# Patient Record
Sex: MALE | Race: WHITE | Employment: OTHER | URBAN - NONMETROPOLITAN AREA
[De-identification: names, ages, dates, MRNs, and addresses within clinical notes are randomized per-mention and may not be internally consistent; named-entity substitution may affect disease eponyms.]

---

## 2017-05-22 ENCOUNTER — APPOINTMENT (OUTPATIENT)
Dept: GENERAL RADIOLOGY | Age: 82
End: 2017-05-22
Payer: MEDICARE

## 2017-05-22 ENCOUNTER — HOSPITAL ENCOUNTER (EMERGENCY)
Age: 82
Discharge: HOME | End: 2017-05-22
Attending: EMERGENCY MEDICINE
Payer: MEDICARE

## 2017-05-22 VITALS
BODY MASS INDEX: 25.09 KG/M2 | RESPIRATION RATE: 20 BRPM | DIASTOLIC BLOOD PRESSURE: 74 MMHG | SYSTOLIC BLOOD PRESSURE: 143 MMHG | OXYGEN SATURATION: 98 % | WEIGHT: 206 LBS | HEIGHT: 76 IN | TEMPERATURE: 97.9 F | HEART RATE: 73 BPM

## 2017-05-22 DIAGNOSIS — M79.602 PAIN OF LEFT UPPER EXTREMITY: Primary | ICD-10-CM

## 2017-05-22 LAB
ABSOLUTE EOS #: 0.1 K/UL (ref 0–0.4)
ABSOLUTE LYMPH #: 1 K/UL (ref 1–4.8)
ABSOLUTE MONO #: 0.4 K/UL (ref 0–1)
ALBUMIN SERPL-MCNC: 4 G/DL (ref 3.5–5.2)
ALBUMIN/GLOBULIN RATIO: 1.3 (ref 1–2.5)
ALP BLD-CCNC: 84 U/L (ref 40–129)
ALT SERPL-CCNC: 9 U/L (ref 5–41)
ANION GAP SERPL CALCULATED.3IONS-SCNC: 12 MMOL/L (ref 9–17)
AST SERPL-CCNC: 14 U/L
BASOPHILS # BLD: 0 %
BASOPHILS ABSOLUTE: 0 K/UL (ref 0–0.2)
BILIRUB SERPL-MCNC: 0.74 MG/DL (ref 0.3–1.2)
BUN BLDV-MCNC: 17 MG/DL (ref 8–23)
BUN/CREAT BLD: 18 (ref 9–20)
CALCIUM SERPL-MCNC: 8.8 MG/DL (ref 8.6–10.4)
CHLORIDE BLD-SCNC: 104 MMOL/L (ref 98–107)
CO2: 23 MMOL/L (ref 20–31)
CREAT SERPL-MCNC: 0.93 MG/DL (ref 0.7–1.2)
DIFFERENTIAL TYPE: NORMAL
EOSINOPHILS RELATIVE PERCENT: 3 %
GFR AFRICAN AMERICAN: >60 ML/MIN
GFR NON-AFRICAN AMERICAN: >60 ML/MIN
GFR SERPL CREATININE-BSD FRML MDRD: ABNORMAL ML/MIN/{1.73_M2}
GFR SERPL CREATININE-BSD FRML MDRD: ABNORMAL ML/MIN/{1.73_M2}
GLUCOSE BLD-MCNC: 130 MG/DL (ref 70–99)
HCT VFR BLD CALC: 41 % (ref 41–53)
HEMOGLOBIN: 14.4 G/DL (ref 13.5–17)
LYMPHOCYTES # BLD: 17 %
MCH RBC QN AUTO: 31.9 PG (ref 26–34)
MCHC RBC AUTO-ENTMCNC: 35.2 G/DL (ref 31–37)
MCV RBC AUTO: 90.7 FL (ref 80–100)
MONOCYTES # BLD: 7 %
PDW BLD-RTO: 13.9 % (ref 12.1–15.2)
PLATELET # BLD: 158 K/UL (ref 140–450)
PLATELET ESTIMATE: NORMAL
PMV BLD AUTO: NORMAL FL (ref 6–12)
POTASSIUM SERPL-SCNC: 4 MMOL/L (ref 3.7–5.3)
RBC # BLD: 4.52 M/UL (ref 4.5–5.9)
RBC # BLD: NORMAL 10*6/UL
SEG NEUTROPHILS: 73 %
SEGMENTED NEUTROPHILS ABSOLUTE COUNT: 4 K/UL (ref 1.8–7.7)
SODIUM BLD-SCNC: 139 MMOL/L (ref 135–144)
TOTAL PROTEIN: 7 G/DL (ref 6.4–8.3)
TROPONIN INTERP: NORMAL
TROPONIN INTERP: NORMAL
TROPONIN T: <0.03 NG/ML
TROPONIN T: <0.03 NG/ML
WBC # BLD: 5.6 K/UL (ref 3.5–11)
WBC # BLD: NORMAL 10*3/UL

## 2017-05-22 PROCEDURE — 36415 COLL VENOUS BLD VENIPUNCTURE: CPT

## 2017-05-22 PROCEDURE — 85025 COMPLETE CBC W/AUTO DIFF WBC: CPT

## 2017-05-22 PROCEDURE — 84484 ASSAY OF TROPONIN QUANT: CPT

## 2017-05-22 PROCEDURE — 99284 EMERGENCY DEPT VISIT MOD MDM: CPT

## 2017-05-22 PROCEDURE — 71020 XR CHEST STANDARD TWO VW: CPT

## 2017-05-22 PROCEDURE — 80053 COMPREHEN METABOLIC PANEL: CPT

## 2017-05-22 PROCEDURE — 6370000000 HC RX 637 (ALT 250 FOR IP): Performed by: EMERGENCY MEDICINE

## 2017-05-22 PROCEDURE — 93005 ELECTROCARDIOGRAM TRACING: CPT

## 2017-05-22 RX ORDER — CALCIUM CARBONATE 500(1250)
500 TABLET ORAL DAILY
COMMUNITY
End: 2021-07-01 | Stop reason: ALTCHOICE

## 2017-05-22 RX ORDER — NITROGLYCERIN 0.4 MG/1
0.4 TABLET SUBLINGUAL EVERY 5 MIN PRN
Status: DISCONTINUED | OUTPATIENT
Start: 2017-05-22 | End: 2017-05-22 | Stop reason: HOSPADM

## 2017-05-22 RX ORDER — GLUCOSAM/MSM/CHOND/HYALURON AC 750-60-150
1 TABLET ORAL DAILY
Status: ON HOLD | COMMUNITY
End: 2017-06-01 | Stop reason: HOSPADM

## 2017-05-22 RX ORDER — CHLORAL HYDRATE 500 MG
1000 CAPSULE ORAL DAILY
COMMUNITY
End: 2021-06-29 | Stop reason: ALTCHOICE

## 2017-05-22 RX ORDER — ASPIRIN 81 MG/1
325 TABLET, CHEWABLE ORAL DAILY
Status: DISCONTINUED | OUTPATIENT
Start: 2017-05-22 | End: 2017-05-22 | Stop reason: HOSPADM

## 2017-05-22 RX ADMIN — NITROGLYCERIN 0.4 MG: 0.4 TABLET SUBLINGUAL at 09:50

## 2017-05-22 RX ADMIN — ASPIRIN 81 MG 325 MG: 81 TABLET ORAL at 09:41

## 2017-05-22 RX ADMIN — NITROGLYCERIN 0.4 MG: 0.4 TABLET SUBLINGUAL at 09:45

## 2017-05-22 RX ADMIN — NITROGLYCERIN 0.4 MG: 0.4 TABLET SUBLINGUAL at 09:40

## 2017-05-22 ASSESSMENT — ENCOUNTER SYMPTOMS
EYE PAIN: 0
NAUSEA: 0
DIARRHEA: 0
VOMITING: 0
SORE THROAT: 0
BACK PAIN: 0
ABDOMINAL PAIN: 0
SHORTNESS OF BREATH: 0
CONSTIPATION: 0

## 2017-05-22 ASSESSMENT — PAIN SCALES - GENERAL
PAINLEVEL_OUTOF10: 7
PAINLEVEL_OUTOF10: 7
PAINLEVEL_OUTOF10: 8
PAINLEVEL_OUTOF10: 6
PAINLEVEL_OUTOF10: 7
PAINLEVEL_OUTOF10: 8
PAINLEVEL_OUTOF10: 8

## 2017-05-22 ASSESSMENT — HEART SCORE: ECG: 1

## 2017-05-22 ASSESSMENT — PAIN DESCRIPTION - ORIENTATION: ORIENTATION: LEFT;POSTERIOR

## 2017-05-22 ASSESSMENT — PAIN DESCRIPTION - PAIN TYPE: TYPE: ACUTE PAIN

## 2017-05-22 ASSESSMENT — PAIN DESCRIPTION - LOCATION: LOCATION: ARM;KNEE

## 2017-05-25 LAB
EKG ATRIAL RATE: 64 BPM
EKG P AXIS: 65 DEGREES
EKG P-R INTERVAL: 228 MS
EKG Q-T INTERVAL: 438 MS
EKG QRS DURATION: 148 MS
EKG QTC CALCULATION (BAZETT): 451 MS
EKG R AXIS: 73 DEGREES
EKG T AXIS: 59 DEGREES
EKG VENTRICULAR RATE: 64 BPM

## 2017-05-30 ENCOUNTER — HOSPITAL ENCOUNTER (EMERGENCY)
Age: 82
Discharge: OTHER INSTITUTION | End: 2017-05-30
Attending: EMERGENCY MEDICINE
Payer: MEDICARE

## 2017-05-30 ENCOUNTER — HOSPITAL ENCOUNTER (INPATIENT)
Age: 82
LOS: 2 days | Discharge: HOME | DRG: 065 | End: 2017-06-01
Attending: EMERGENCY MEDICINE | Admitting: INTERNAL MEDICINE
Payer: MEDICARE

## 2017-05-30 ENCOUNTER — APPOINTMENT (OUTPATIENT)
Dept: GENERAL RADIOLOGY | Age: 82
End: 2017-05-30
Payer: MEDICARE

## 2017-05-30 ENCOUNTER — APPOINTMENT (OUTPATIENT)
Dept: CT IMAGING | Age: 82
End: 2017-05-30
Payer: MEDICARE

## 2017-05-30 VITALS
DIASTOLIC BLOOD PRESSURE: 105 MMHG | HEART RATE: 64 BPM | OXYGEN SATURATION: 97 % | RESPIRATION RATE: 15 BRPM | SYSTOLIC BLOOD PRESSURE: 138 MMHG

## 2017-05-30 DIAGNOSIS — R53.1 RIGHT SIDED WEAKNESS: Primary | ICD-10-CM

## 2017-05-30 DIAGNOSIS — R29.898 WEAKNESS OF RIGHT UPPER EXTREMITY: Primary | ICD-10-CM

## 2017-05-30 DIAGNOSIS — R27.8 DYSMETRIA: ICD-10-CM

## 2017-05-30 DIAGNOSIS — R27.0 ATAXIA: ICD-10-CM

## 2017-05-30 PROBLEM — R42 DISEQUILIBRIUM: Status: RESOLVED | Noted: 2017-05-30 | Resolved: 2017-05-30

## 2017-05-30 PROBLEM — I10 HYPERTENSION: Status: ACTIVE | Noted: 2017-05-30

## 2017-05-30 PROBLEM — R42 DISEQUILIBRIUM: Status: ACTIVE | Noted: 2017-05-30

## 2017-05-30 PROBLEM — M62.81 RIGHT-SIDED MUSCLE WEAKNESS: Status: ACTIVE | Noted: 2017-05-30

## 2017-05-30 LAB
ABSOLUTE EOS #: 0.1 K/UL (ref 0–0.4)
ABSOLUTE LYMPH #: 0.9 K/UL (ref 1–4.8)
ABSOLUTE MONO #: 0.4 K/UL (ref 0–1)
ALBUMIN SERPL-MCNC: 3.9 G/DL (ref 3.5–5.2)
ALBUMIN/GLOBULIN RATIO: 1.3 (ref 1–2.5)
ALP BLD-CCNC: 81 U/L (ref 40–129)
ALT SERPL-CCNC: 7 U/L (ref 5–41)
ANION GAP SERPL CALCULATED.3IONS-SCNC: 15 MMOL/L (ref 9–17)
AST SERPL-CCNC: 13 U/L
BASOPHILS # BLD: 0 %
BASOPHILS ABSOLUTE: 0 K/UL (ref 0–0.2)
BILIRUB SERPL-MCNC: 0.71 MG/DL (ref 0.3–1.2)
BUN BLDV-MCNC: 20 MG/DL (ref 8–23)
BUN/CREAT BLD: 20 (ref 9–20)
CALCIUM SERPL-MCNC: 8.9 MG/DL (ref 8.6–10.4)
CHLORIDE BLD-SCNC: 101 MMOL/L (ref 98–107)
CHOLESTEROL/HDL RATIO: 4.3
CHOLESTEROL: 153 MG/DL
CO2: 24 MMOL/L (ref 20–31)
CREAT SERPL-MCNC: 0.98 MG/DL (ref 0.7–1.2)
DIFFERENTIAL TYPE: ABNORMAL
EOSINOPHILS RELATIVE PERCENT: 3 %
GFR AFRICAN AMERICAN: >60 ML/MIN
GFR NON-AFRICAN AMERICAN: >60 ML/MIN
GFR SERPL CREATININE-BSD FRML MDRD: ABNORMAL ML/MIN/{1.73_M2}
GFR SERPL CREATININE-BSD FRML MDRD: ABNORMAL ML/MIN/{1.73_M2}
GLUCOSE BLD-MCNC: 112 MG/DL (ref 70–99)
GLUCOSE BLD-MCNC: 94 MG/DL (ref 74–100)
HCT VFR BLD CALC: 41.7 % (ref 41–53)
HDLC SERPL-MCNC: 36 MG/DL
HEMOGLOBIN: 14.5 G/DL (ref 13.5–17)
INR BLD: 1.1
LDL CHOLESTEROL: 93 MG/DL (ref 0–130)
LYMPHOCYTES # BLD: 20 %
MCH RBC QN AUTO: 31.7 PG (ref 26–34)
MCHC RBC AUTO-ENTMCNC: 34.8 G/DL (ref 31–37)
MCV RBC AUTO: 90.9 FL (ref 80–100)
MONOCYTES # BLD: 8 %
PDW BLD-RTO: 13.6 % (ref 12.1–15.2)
PLATELET # BLD: 163 K/UL (ref 140–450)
PLATELET ESTIMATE: ABNORMAL
PMV BLD AUTO: ABNORMAL FL (ref 6–12)
POTASSIUM SERPL-SCNC: 3.9 MMOL/L (ref 3.7–5.3)
PROTHROMBIN TIME: 11.7 SEC (ref 9.4–12.6)
RBC # BLD: 4.59 M/UL (ref 4.5–5.9)
RBC # BLD: ABNORMAL 10*6/UL
SEG NEUTROPHILS: 69 %
SEGMENTED NEUTROPHILS ABSOLUTE COUNT: 3.2 K/UL (ref 1.8–7.7)
SODIUM BLD-SCNC: 140 MMOL/L (ref 135–144)
TOTAL PROTEIN: 6.9 G/DL (ref 6.4–8.3)
TRIGL SERPL-MCNC: 122 MG/DL
TROPONIN INTERP: NORMAL
TROPONIN T: <0.03 NG/ML
TSH SERPL DL<=0.05 MIU/L-ACNC: 3.42 MIU/L (ref 0.3–5)
VITAMIN D 25-HYDROXY: 28.5 NG/ML (ref 30–100)
VLDLC SERPL CALC-MCNC: ABNORMAL MG/DL (ref 1–30)
WBC # BLD: 4.7 K/UL (ref 3.5–11)
WBC # BLD: ABNORMAL 10*3/UL

## 2017-05-30 PROCEDURE — 2580000003 HC RX 258: Performed by: STUDENT IN AN ORGANIZED HEALTH CARE EDUCATION/TRAINING PROGRAM

## 2017-05-30 PROCEDURE — 6370000000 HC RX 637 (ALT 250 FOR IP): Performed by: PHYSICIAN ASSISTANT

## 2017-05-30 PROCEDURE — 85610 PROTHROMBIN TIME: CPT

## 2017-05-30 PROCEDURE — 71020 XR CHEST STANDARD TWO VW: CPT

## 2017-05-30 PROCEDURE — 2060000000 HC ICU INTERMEDIATE R&B

## 2017-05-30 PROCEDURE — 80061 LIPID PANEL: CPT

## 2017-05-30 PROCEDURE — 2580000003 HC RX 258: Performed by: PHYSICIAN ASSISTANT

## 2017-05-30 PROCEDURE — 82306 VITAMIN D 25 HYDROXY: CPT

## 2017-05-30 PROCEDURE — 70450 CT HEAD/BRAIN W/O DYE: CPT

## 2017-05-30 PROCEDURE — 85025 COMPLETE CBC W/AUTO DIFF WBC: CPT

## 2017-05-30 PROCEDURE — 82947 ASSAY GLUCOSE BLOOD QUANT: CPT

## 2017-05-30 PROCEDURE — 36415 COLL VENOUS BLD VENIPUNCTURE: CPT

## 2017-05-30 PROCEDURE — 99223 1ST HOSP IP/OBS HIGH 75: CPT | Performed by: INTERNAL MEDICINE

## 2017-05-30 PROCEDURE — 84443 ASSAY THYROID STIM HORMONE: CPT

## 2017-05-30 PROCEDURE — 12001 RPR S/N/AX/GEN/TRNK 2.5CM/<: CPT

## 2017-05-30 PROCEDURE — 83036 HEMOGLOBIN GLYCOSYLATED A1C: CPT

## 2017-05-30 PROCEDURE — 84484 ASSAY OF TROPONIN QUANT: CPT

## 2017-05-30 PROCEDURE — 93005 ELECTROCARDIOGRAM TRACING: CPT

## 2017-05-30 PROCEDURE — 80053 COMPREHEN METABOLIC PANEL: CPT

## 2017-05-30 PROCEDURE — 99285 EMERGENCY DEPT VISIT HI MDM: CPT

## 2017-05-30 PROCEDURE — 99283 EMERGENCY DEPT VISIT LOW MDM: CPT

## 2017-05-30 RX ORDER — SODIUM CHLORIDE 0.9 % (FLUSH) 0.9 %
10 SYRINGE (ML) INJECTION PRN
Status: DISCONTINUED | OUTPATIENT
Start: 2017-05-30 | End: 2017-06-01 | Stop reason: HOSPADM

## 2017-05-30 RX ORDER — IBUPROFEN 200 MG
1250 CAPSULE ORAL DAILY
Status: DISCONTINUED | OUTPATIENT
Start: 2017-05-30 | End: 2017-06-01 | Stop reason: HOSPADM

## 2017-05-30 RX ORDER — CLOPIDOGREL BISULFATE 75 MG/1
300 TABLET ORAL ONCE
Status: COMPLETED | OUTPATIENT
Start: 2017-05-30 | End: 2017-05-30

## 2017-05-30 RX ORDER — NICOTINE POLACRILEX 4 MG
15 LOZENGE BUCCAL PRN
Status: DISCONTINUED | OUTPATIENT
Start: 2017-05-30 | End: 2017-06-01 | Stop reason: HOSPADM

## 2017-05-30 RX ORDER — GLUCOSAM/MSM/CHOND/HYALURON AC 750-60-150
1 TABLET ORAL DAILY
Status: DISCONTINUED | OUTPATIENT
Start: 2017-05-30 | End: 2017-05-30

## 2017-05-30 RX ORDER — LABETALOL HYDROCHLORIDE 5 MG/ML
10 INJECTION, SOLUTION INTRAVENOUS EVERY 4 HOURS PRN
Status: DISCONTINUED | OUTPATIENT
Start: 2017-05-30 | End: 2017-06-01 | Stop reason: HOSPADM

## 2017-05-30 RX ORDER — ACETAMINOPHEN 325 MG/1
650 TABLET ORAL EVERY 4 HOURS PRN
Status: DISCONTINUED | OUTPATIENT
Start: 2017-05-30 | End: 2017-06-01 | Stop reason: HOSPADM

## 2017-05-30 RX ORDER — DEXTROSE MONOHYDRATE 25 G/50ML
12.5 INJECTION, SOLUTION INTRAVENOUS PRN
Status: DISCONTINUED | OUTPATIENT
Start: 2017-05-30 | End: 2017-06-01 | Stop reason: HOSPADM

## 2017-05-30 RX ORDER — SODIUM CHLORIDE 0.9 % (FLUSH) 0.9 %
10 SYRINGE (ML) INJECTION PRN
Status: CANCELLED | OUTPATIENT
Start: 2017-05-30

## 2017-05-30 RX ORDER — CHLORAL HYDRATE 500 MG
1000 CAPSULE ORAL DAILY
Status: DISCONTINUED | OUTPATIENT
Start: 2017-05-30 | End: 2017-05-30

## 2017-05-30 RX ORDER — CLOPIDOGREL BISULFATE 75 MG/1
75 TABLET ORAL DAILY
Status: DISCONTINUED | OUTPATIENT
Start: 2017-05-30 | End: 2017-05-30

## 2017-05-30 RX ORDER — ASPIRIN 81 MG/1
81 TABLET, CHEWABLE ORAL DAILY
Status: DISCONTINUED | OUTPATIENT
Start: 2017-05-30 | End: 2017-05-31

## 2017-05-30 RX ORDER — SODIUM CHLORIDE 0.9 % (FLUSH) 0.9 %
10 SYRINGE (ML) INJECTION EVERY 12 HOURS SCHEDULED
Status: DISCONTINUED | OUTPATIENT
Start: 2017-05-30 | End: 2017-06-01 | Stop reason: HOSPADM

## 2017-05-30 RX ORDER — DEXTROSE MONOHYDRATE 50 MG/ML
100 INJECTION, SOLUTION INTRAVENOUS PRN
Status: DISCONTINUED | OUTPATIENT
Start: 2017-05-30 | End: 2017-06-01 | Stop reason: HOSPADM

## 2017-05-30 RX ORDER — ONDANSETRON 2 MG/ML
4 INJECTION INTRAMUSCULAR; INTRAVENOUS EVERY 6 HOURS PRN
Status: DISCONTINUED | OUTPATIENT
Start: 2017-05-30 | End: 2017-06-01 | Stop reason: HOSPADM

## 2017-05-30 RX ORDER — 0.9 % SODIUM CHLORIDE 0.9 %
250 INTRAVENOUS SOLUTION INTRAVENOUS ONCE
Status: COMPLETED | OUTPATIENT
Start: 2017-05-30 | End: 2017-05-30

## 2017-05-30 RX ORDER — SODIUM CHLORIDE 0.9 % (FLUSH) 0.9 %
10 SYRINGE (ML) INJECTION EVERY 12 HOURS SCHEDULED
Status: CANCELLED | OUTPATIENT
Start: 2017-05-30

## 2017-05-30 RX ADMIN — CLOPIDOGREL BISULFATE 300 MG: 75 TABLET ORAL at 15:00

## 2017-05-30 RX ADMIN — SODIUM CHLORIDE 250 ML: 9 INJECTION, SOLUTION INTRAVENOUS at 15:00

## 2017-05-30 RX ADMIN — Medication 10 ML: at 21:37

## 2017-05-30 ASSESSMENT — ENCOUNTER SYMPTOMS
SHORTNESS OF BREATH: 0
SHORTNESS OF BREATH: 0
EYE PAIN: 0
WHEEZING: 0
EYE REDNESS: 0
NAUSEA: 0
DIARRHEA: 0
ABDOMINAL PAIN: 0
NAUSEA: 0
COUGH: 0
ABDOMINAL PAIN: 0
VOMITING: 0
BACK PAIN: 0
RHINORRHEA: 0
BLURRED VISION: 1
PHOTOPHOBIA: 0
VOMITING: 0
EYE DISCHARGE: 0
CONSTIPATION: 0
DIARRHEA: 0
SORE THROAT: 0
WHEEZING: 0
BLOOD IN STOOL: 0
SORE THROAT: 0
CHEST TIGHTNESS: 0
DOUBLE VISION: 0
COUGH: 0

## 2017-05-30 ASSESSMENT — PAIN SCALES - GENERAL: PAINLEVEL_OUTOF10: 0

## 2017-05-30 NOTE — IP AVS SNAPSHOT
Patient Information     Patient Name JOSIE Drew 1932      Important Information for Stroke      If you have a current diagnosis or history of any of the following, please review the information carefully. STROKE PATIENTS:  If you notice any sign or symptom that indicates that you may be having a stroke, activate the emergency medical services immediately by calling [id number]. These symptoms include: uneven facial expression, arm(s) drifting down, strange speech or loss of speech, vision problems, sudden severe headache, sudden numbness or face/arms/legs, sudden confusion or difficult understanding, sudden dizziness, sudden difficulty with walking. Continue or begin taking the medications prescribed by your physician as listed above. There are personal risk that are associated with [place]. Anyone can have a stroke no matter your age, race or gender. The chances of having a stroke increase if you have certain risk factors. The best way to protect yourself and loved ones from stroke is to understand your personal risk and how to manage it. There are 2 types of risk factors for stroke: uncontrollable and controllable. Uncontrollable risk factors include being over age 54, being male, being ,  or /, or having a personal or family history of a stroke or transient ischemic attack (TIA). Controllable risk factors generally fall into two categories: lifestyle risk factors or medical risk factors. Lifestyle risk factors can often be changed, while medical risk factors can usually be treated. Both types can be managed best by working with a doctor, who can prescribe medications and advise on how to adopt a healthy lifestyle. Controllable risk factors include high blood pressure, atrial fibrillation, high cholesterol, diabetes, atherosclerosis, circulation problems, tobacco use or smoking, alcohol use, lack of exercise, and obesity.

## 2017-05-30 NOTE — H&P
file. He reports that he drinks about 2.4 oz of alcohol per week  He reports that he does not use illicit drugs. FAMILY HISTORY      family history is not on file. REVIEW OF SYSTEMS      · Constitutional: Negative for weight loss  · Eyes: Negative for visual changes, diplopia, scleral icterus. · ENT: Negative for Headaches, vertigo, mouth sores, sore throat. · Cardiovascular: Negative for lightheadedness/orthostatic symptoms ,chest pain, dyspnea on exertion, palpitations or loss of consciousness. · Respiratory: Negative for cough or wheezing, sputum production, hemoptysis, pleuritic pain. · Gastrointestinal: Negative for nausea/vomiting, change in bowel habits, abdominal pain, dysphagia/appetite loss, hematemesis, blood in stools. · Genitourinary:Negative for change in bladder habits, dysuria, trouble voiding, hematuria. · Musculoskeletal: Positive for gait disturbance, weakness  · Integumentary: Negative for rash, pruritis. · Neurological: Positive for change in muscle strength, change in gait, balance, coordination Negative for headache, dizziness, loss of sensation  · Psychiatric: negative for change in mood, affect, memory, mentation, behavior. · Endocrine: negative for temperature intolerance, excessive thirst, fluid intake, or urination, tremor. · Hematologic/Lymphatic: negative for abnormal bruising or bleeding, blood clots, swollen lymph nodes. · Allergic/Immunologic: negative for nasal congestion, pruritis, hives. PHYSICAL EXAM      BP (!) 159/73  Pulse 59  Temp 97.8 °F (36.6 °C) (Oral)   Resp 12  Ht 6' 3\" (1.905 m)  Wt 196 lb (88.9 kg)  SpO2 99%  BMI 24.5 kg/m2     · General appearance: well nourished  · HEENT: Head: Normocephalic, no lesions, without obvious abnormality.   · Lungs: clear to auscultation bilaterally  · Heart: regular rate and rhythm, S1, S2 normal, no murmur, click, rub or gallop  · Abdomen: soft, non-tender; bowel sounds normal; no masses,  no Extremity weakness, possible CVA       FINDINGS: No evidence of acute cerebral cortical infarction, hemorrhage or mass lesion. Patchy and scattered foci  of hypoattenuation in the subcortical and periventricular white matter of both cerebral hemispheres. Although nonspecific, this is    typically seen as a sequela of chronic ischemic small vessel disease in a patient of this age. Mild  diffuse cerebral and cerebellar atrophy. The ventricular system and cortical sulci are appropriate for degree of atrophy. Calvarium is intact. Paranasal    sinuses and mastoid air cells are clear.       1. No acute intracranial process   2. Mild  chronic white matter changes and atrophy       Final report electronically signed by María Paulson on 5/30/2017 2:17 PM        ADDENDUM: INCORRECT REPORT. PLEASE SEE BELOW REVISED REPORT.       REPORT: Chest PA and lateral       INDICATION: CVA       FINDINGS: Compared to 5/22/2017. The lungs are well expanded and clear bilaterally. No focal consolidation, pleural effusion or pneumothorax seen. Normal cardiac and mediastinal silhouettes. No free intraperitoneal air.  Hypertrophic degenerative changes    thoracic spine.           Final report electronically signed by María Paulson on 5/30/2017 3:44 PM    ORIGINAL REPORT   REPORT: Chest PA and lateral       INDICATION: CVA       FINDINGS: Compared to 12/24/2015 The lungs are well expanded and clear bilaterally. No focal consolidation, pleural effusion or pneumothorax seen. Normal stable cardiomegaly. No free intraperitoneal air.  Degenerative changes thoracic spine.           Final report electronically signed by María Paulson on 5/30/2017 2:18 PM       Impression   Normal chest    IMPRESSION: No acute pulmonary process         ASSESSMENT     Patient Active Problem List   Diagnosis    Right-sided muscle weakness    Hypertension       PLAN      1. Neurology consult  2. cardene drip for BP control  3.  Labetalol PRN  4. HgA1C, Lipid panel, PT/INR, TSH

## 2017-05-30 NOTE — CARE COORDINATION
Case Management Initial Discharge Plan  Mary Felicita,         Readmission Risk              Readmission Risk:        3.5       Age 72 or Greater:  1    Admitted from SNF or Requires Paid or Family Care:  0    Currently has CHF,COPD,ARF,CRI,or is on dialysis:  0    Takes more than 5 Prescription Medications:  0    Takes Digoxin,Insulin,Anticoagulants,Narcotics or ASA/Plavix:  [address] in Past 12 Months:  0    On Disability:  0    Patient Considers own Health:  2.5            Met with:patient to discuss discharge plans. Information verified: address, contacts, phone number, , insurance Yes  PCP: Ross Parker DO  Date of last visit: 17    Insurance Provider: Medicare    Discharge Planning  Current Residence:  Private Residence  Living Arrangements:  [place] has 2 stories/1 flight stairs to climb, pt lives on main level  Support Systems:  None  Current Services PTA:  None Supplier: na  Patient able to perform ADL's:Independent  DME used to aid ambulation prior to admission: pt has a \"walking stick\" that he uses prn/during admission to be determined    Potential Assistance Needed:  Transportation    Pharmacy: [facility]   Potential Assistance Purchasing Medications:  No  Does patient want to participate in local refill/ meds to beds program?  No    Patient agreeable to home care: No  [place] of choice provided:  n/a      Type of Home Care Services:  None  Patient expects to be discharged to:  Home    Prior SNF/Rehab Placement and Facility: no  Agreeable to SNF/Rehab: No  [place] of choice provided: n/a   Evaluation: n/a    Expected Discharge date:      Follow Up Appointment: Best Day/ Time:      Transportation provider: unsure  Transportation arrangements needed for discharge: Yes    Discharge Plan: Home independently, possible [facility].        Electronically signed by Rolly Asher RN on 17 at 5:58 PM

## 2017-05-30 NOTE — IP AVS SNAPSHOT
After Visit Summary  (Discharge Instructions)    Medication List for Home    Based on the information you provided to us as well as any changes during this visit, the following is your updated medication list.  Compare this with your prescription bottles at home. If you have any questions or concerns, contact your primary care physician's office. Daily Medication List (This medication list can be shared with any healthcare provider who is helping you manage your medications)      There are NEW medications for you. START taking them after you leave the hospital        Last Dose    Next Dose Due AM NOON PM NIGHT    aspirin 81 MG chewable tablet   Take 1 tablet by mouth daily   Replaces:  aspirin 81 MG tablet                81 mg on 6/1/2017  9:11 AM                            atorvastatin 40 MG tablet   Commonly known as:  LIPITOR   Take 1 tablet by mouth nightly                40 mg on 5/31/2017  9:58 PM                            clopidogrel 75 MG tablet   Commonly known as:  PLAVIX   Take 1 tablet by mouth daily                75 mg on 6/1/2017  2:05 PM                              These are medications you told us you were taking at home, CONTINUE taking them after you leave the hospital        Last Dose    Next Dose Due AM NOON PM NIGHT    calcium carbonate 500 MG Tabs tablet   Commonly known as:  OSCAL   Take 500 mg by mouth daily                                         fish oil 1000 MG Caps   Take 1,000 mg by mouth daily                                         NONFORMULARY   Take 1 tablet by mouth daily neutriview.                                            These are the medications you have told us you were taking at home, STOP taking them after you leave the hospital     aspirin 81 MG tablet   Replaced by:  aspirin 81 MG chewable tablet       GLUCOSAMINE CHONDROITIN JOINT Tabs            Where to Get Your Medications      These medications were sent to [place] [id number] Your primary diagnosis was:  Ischemic Stroke ([facility])    Your diagnoses also included:  Right-Sided Muscle Weakness, Disequilibrium, Hypertension, Vitamin D Deficiency, Right Sided Weakness      Visit Information     Date & Time Department Dept. Phone    5/30/2017 [facility] Neuro [phone]       Follow-up Appointments    Below is a list of your follow-up and future appointments. This may not be a complete list as you may have made appointments directly with providers that we are not aware of or your providers may have made some for you. Please call your providers to confirm appointments. It is important to keep your appointments. Please bring your current insurance card, photo ID, co-pay, and all medication bottles to your appointment. If self-pay, payment is expected at the time of service. Follow-up Information     Follow up with Raymond Shabazz DO. Specialty:  Family Medicine    Contact information:    [address] [place] [address] Hakeem Garcia[address]  [phone]          Follow up with Kary Oates MD On 7/5/2017. Specialty:  Neurology    Why:  Neurology follow-up post stroke, arrival time is 2:00 PM    Contact information:    [place], [address] [place] [address]  [phone]        Future Appointments     7/5/2017 2:20 PM     Appointment with Kary Oates MD at [facility] ([phone])   Please arrive 15 minutes prior to appointment, bring insurance card and photo ID.   [place] Once You Return Home    This section includes instructions you will need to follow once you leave the hospital.  Your care team will discuss these with you, so you and those caring for you know how to best care for your health needs at home. This section may also include educational information about certain health topics that may be of help to you.           Discharge Instructions [facility]) Continuity of Care Form    Patient Name: Jeremy Arreguin   :  1932  MRN:  [id number]    Admit date:  2017  Discharge date:  ***    Code Status Order: Full Code   Advance Directives: {YES OR NO:}    Admitting Physician:  Samy Cantor MD  PCP: Giuseppe Maya DO    Discharging Nurse: [facility] Unit/Room#: 9459/4923-19  Discharging Unit Phone Number: ***    Emergency Contact:   Contact 1: Name: Larry Solitario  Contact 1: Number: [phone]  Contact 1: Relationship: son    Past Surgical History:  Past Surgical History:   Procedure Laterality Date    EYE SURGERY      new lens    SHOULDER SURGERY      right       Immunization History: There is no immunization history on file for this patient. Active Problems:  Patient Active Problem List   Diagnosis    Right-sided muscle weakness    Hypertension    Vitamin D deficiency       Isolation/Infection:   Isolation     No Isolation            Nurse Assessment:  Last Vital Signs: BP (!) 173/69  Pulse 64  Temp 97.6 °F (36.4 °C) (Oral)   Resp 16  Ht 6' 3\" (1.905 m)  Wt 182 lb 8 oz (82.8 kg)  SpO2 99%  BMI 22.81 kg/m2    Last documented pain score (0-10 scale): Pain Level: 0  Last Weight:   Wt Readings from Last 1 Encounters:   17 182 lb 8 oz (82.8 kg)     Mental Status:  oriented and alert     IV Access:  - None    Nursing Mobility/ADLs:  Walking   Independent  Transfer  Independent  Bathing  Independent  Dressing  Independent  [address]  Independent  Med Delivery   whole    Wound Care Documentation and Therapy:        Elimination:  Continence:   · Bowel:  Yes  · Bladder: Yes  Urinary Catheter: None   Colostomy/Ileostomy/Ileal Conduit: No    Date of Last BM: ***    Intake/Output Summary (Last 24 hours) at 17 1423  Last data filed at 17 0610   Gross per 24 hour   Intake              460 ml   Output              200 ml   Net              260 ml I/O last 3 completed shifts: In: 26 [P.O.:450; I.V.:10]  Out: 200 [Urine:200]    Safety Concerns: At Risk for Falls    Impairments/Disabilities:      Vision and Hearing    Nutrition Therapy:  Current Nutrition Therapy:   - Oral Diet:  General    Routes of Feeding: Oral  Liquids: No Liquids  Daily Fluid Restriction: no  Last Modified Barium Swallow with Video (Video Swallowing Test): not done    Treatments at the Time of Hospital Discharge:   Respiratory Treatments: ***  Oxygen Therapy:  is not on home oxygen therapy. Ventilator:    - No ventilator support    Rehab Therapies: Physical Therapy and Occupational Therapy  Weight Bearing Status/Restrictions: No weight bearing restirctions  Other Medical Equipment (for information only, NOT a DME order):  {EQUIPMENT:[id number]}  Other Treatments: Skilled RN assessment, medication review, discharge paperwork reinforcement, Social work consult for HHA. Patient's personal belongings (please select all that are sent with patient):  pt belongings sent with patient    RN SIGNATURE:  Electronically signed by Juani Henderson RN on 6/1/17 at 6:06 PM    PHYSICIAN SECTION    Prognosis: Good    Condition at Discharge: Stable    Rehab Potential (if transferring to Rehab): Good    Recommended Labs or Other Treatments After Discharge: none    Physician Certification: I certify the above information and transfer of Sharon Hancock  is necessary for the continuing treatment of the diagnosis listed and that he requires [address] for greater 30 days.      Update Admission H&P: Changes in H&P as follows - see discharge summary    PHYSICIAN SIGNATURE:  Electronically signed by Tere Odom MD on 6/1/17 at 4:35 PM    CASE MANAGEMENT/SOCIAL WORK SECTION    Inpatient Status Date: 05/30/2017    [facility]  Risk Score: 2.25   (Score > 14= high risk for readmission)    Discharging to Facility/ Agency   · Name: [facility]  · Address:

## 2017-05-30 NOTE — H&P
headaches. Endo/Heme/Allergies: Negative for environmental allergies. Psychiatric/Behavioral: Negative for substance abuse. MEDICAL HISTORY          Diagnosis Date    Uses hearing aid        SURGICAL HISTORY          Procedure Laterality Date    EYE SURGERY      new lens    SHOULDER SURGERY      right       ***    SOCIAL HISTORY      Social History     Social History    Marital status: Single     Spouse name: N/A    Number of children: N/A    Years of education: N/A     Occupational History    Not on file. Social History Main Topics    Smoking status: Former Smoker    Smokeless tobacco: Not on file    Alcohol use 2.4 oz/week     4 Cans of beer per week    Drug use: No    Sexual activity: Not on file     Other Topics Concern    Not on file     Social History Narrative       MEDICATIONS      Previous Medications    ASPIRIN 81 MG TABLET    Take 81 mg by mouth daily    CALCIUM CARBONATE (OSCAL) 500 MG TABS TABLET    Take 500 mg by mouth daily    GLUCOS-CHONDROIT-HYALURON-MSM (GLUCOSAMINE CHONDROITIN JOINT) TABS    Take 1 tablet by mouth daily    NONFORMULARY    Take 1 tablet by mouth daily neutriview. OMEGA-3 FATTY ACIDS (FISH OIL) 1000 MG CAPS    Take 1,000 mg by mouth daily       ALLERGIES    has No Known Allergies.       PHYSICAL EXAM     Vitals:    Vitals:    05/30/17 1718 05/30/17 1721 05/30/17 1732   BP: (!) 163/70 (!) 163/70 (!) 153/63   Pulse: 59 60 59   Resp: 12 15 13   Temp: 97.8 °F (36.6 °C)     TempSrc: Oral     SpO2: 99% 98% 99%   Weight: 196 lb (88.9 kg)     Height: 6' 3\" (1.905 m)         ***        DIAGNOSTIC RESULTS     EKG: All EKG's are interpreted by the Emergency Department Physician who either signs or Co-signs this chart in the absence of a cardiologist.    ***    RADIOLOGY:   I directly visualized the following  images and reviewed the radiologist interpretations:     No orders to display       LABS:  Labs Reviewed - No data to display    ***    EMERGENCY DEPARTMENT

## 2017-05-31 ENCOUNTER — APPOINTMENT (OUTPATIENT)
Dept: MRI IMAGING | Age: 82
DRG: 065 | End: 2017-05-31
Payer: MEDICARE

## 2017-05-31 PROBLEM — E55.9 VITAMIN D DEFICIENCY: Status: ACTIVE | Noted: 2017-05-31

## 2017-05-31 LAB
ANION GAP SERPL CALCULATED.3IONS-SCNC: 18 MMOL/L (ref 9–17)
BUN BLDV-MCNC: 18 MG/DL (ref 8–23)
BUN/CREAT BLD: ABNORMAL (ref 9–20)
CALCIUM SERPL-MCNC: 8.7 MG/DL (ref 8.6–10.4)
CHLORIDE BLD-SCNC: 107 MMOL/L (ref 98–107)
CO2: 20 MMOL/L (ref 20–31)
CREAT SERPL-MCNC: 0.85 MG/DL (ref 0.7–1.2)
EKG ATRIAL RATE: 62 BPM
EKG ATRIAL RATE: 68 BPM
EKG P AXIS: 60 DEGREES
EKG P AXIS: 60 DEGREES
EKG P-R INTERVAL: 214 MS
EKG P-R INTERVAL: 230 MS
EKG Q-T INTERVAL: 436 MS
EKG Q-T INTERVAL: 436 MS
EKG QRS DURATION: 160 MS
EKG QRS DURATION: 160 MS
EKG QTC CALCULATION (BAZETT): 442 MS
EKG QTC CALCULATION (BAZETT): 463 MS
EKG R AXIS: 61 DEGREES
EKG R AXIS: 66 DEGREES
EKG T AXIS: 39 DEGREES
EKG T AXIS: 43 DEGREES
EKG VENTRICULAR RATE: 62 BPM
EKG VENTRICULAR RATE: 68 BPM
ESTIMATED AVERAGE GLUCOSE: 77 MG/DL
GFR AFRICAN AMERICAN: >60 ML/MIN
GFR NON-AFRICAN AMERICAN: >60 ML/MIN
GFR SERPL CREATININE-BSD FRML MDRD: ABNORMAL ML/MIN/{1.73_M2}
GFR SERPL CREATININE-BSD FRML MDRD: ABNORMAL ML/MIN/{1.73_M2}
GLUCOSE BLD-MCNC: 94 MG/DL (ref 70–99)
HBA1C MFR BLD: 4.3 % (ref 4–6)
POTASSIUM SERPL-SCNC: 4.1 MMOL/L (ref 3.7–5.3)
SODIUM BLD-SCNC: 145 MMOL/L (ref 135–144)

## 2017-05-31 PROCEDURE — 97530 THERAPEUTIC ACTIVITIES: CPT

## 2017-05-31 PROCEDURE — 6360000002 HC RX W HCPCS: Performed by: STUDENT IN AN ORGANIZED HEALTH CARE EDUCATION/TRAINING PROGRAM

## 2017-05-31 PROCEDURE — 70551 MRI BRAIN STEM W/O DYE: CPT

## 2017-05-31 PROCEDURE — 6370000000 HC RX 637 (ALT 250 FOR IP): Performed by: HOSPITALIST

## 2017-05-31 PROCEDURE — 2580000003 HC RX 258: Performed by: STUDENT IN AN ORGANIZED HEALTH CARE EDUCATION/TRAINING PROGRAM

## 2017-05-31 PROCEDURE — G8979 MOBILITY GOAL STATUS: HCPCS

## 2017-05-31 PROCEDURE — G8978 MOBILITY CURRENT STATUS: HCPCS

## 2017-05-31 PROCEDURE — 99233 SBSQ HOSP IP/OBS HIGH 50: CPT | Performed by: INTERNAL MEDICINE

## 2017-05-31 PROCEDURE — 6370000000 HC RX 637 (ALT 250 FOR IP): Performed by: STUDENT IN AN ORGANIZED HEALTH CARE EDUCATION/TRAINING PROGRAM

## 2017-05-31 PROCEDURE — 70549 MR ANGIOGRAPH NECK W/O&W/DYE: CPT

## 2017-05-31 PROCEDURE — 99222 1ST HOSP IP/OBS MODERATE 55: CPT | Performed by: PSYCHIATRY & NEUROLOGY

## 2017-05-31 PROCEDURE — 2060000000 HC ICU INTERMEDIATE R&B

## 2017-05-31 PROCEDURE — 80048 BASIC METABOLIC PNL TOTAL CA: CPT

## 2017-05-31 PROCEDURE — 97161 PT EVAL LOW COMPLEX 20 MIN: CPT

## 2017-05-31 PROCEDURE — G8980 MOBILITY D/C STATUS: HCPCS

## 2017-05-31 PROCEDURE — 6360000004 HC RX CONTRAST MEDICATION: Performed by: PSYCHIATRY & NEUROLOGY

## 2017-05-31 PROCEDURE — 36415 COLL VENOUS BLD VENIPUNCTURE: CPT

## 2017-05-31 PROCEDURE — 70544 MR ANGIOGRAPHY HEAD W/O DYE: CPT

## 2017-05-31 PROCEDURE — A9579 GAD-BASE MR CONTRAST NOS,1ML: HCPCS | Performed by: PSYCHIATRY & NEUROLOGY

## 2017-05-31 RX ORDER — ATORVASTATIN CALCIUM 40 MG/1
40 TABLET, FILM COATED ORAL NIGHTLY
Status: DISCONTINUED | OUTPATIENT
Start: 2017-05-31 | End: 2017-06-01 | Stop reason: HOSPADM

## 2017-05-31 RX ORDER — SODIUM CHLORIDE 0.9 % (FLUSH) 0.9 %
10 SYRINGE (ML) INJECTION PRN
Status: DISCONTINUED | OUTPATIENT
Start: 2017-05-31 | End: 2017-06-01 | Stop reason: HOSPADM

## 2017-05-31 RX ORDER — AMLODIPINE BESYLATE 5 MG/1
5 TABLET ORAL DAILY
Status: DISCONTINUED | OUTPATIENT
Start: 2017-05-31 | End: 2017-06-01 | Stop reason: HOSPADM

## 2017-05-31 RX ORDER — ASPIRIN 81 MG/1
81 TABLET, CHEWABLE ORAL DAILY
Status: DISCONTINUED | OUTPATIENT
Start: 2017-06-01 | End: 2017-06-01 | Stop reason: HOSPADM

## 2017-05-31 RX ORDER — CLOPIDOGREL BISULFATE 75 MG/1
75 TABLET ORAL DAILY
Status: DISCONTINUED | OUTPATIENT
Start: 2017-05-31 | End: 2017-06-01 | Stop reason: HOSPADM

## 2017-05-31 RX ADMIN — GADOPENTETATE DIMEGLUMINE 16 ML: 469.01 INJECTION INTRAVENOUS at 18:07

## 2017-05-31 RX ADMIN — ATORVASTATIN CALCIUM 40 MG: 40 TABLET, FILM COATED ORAL at 21:58

## 2017-05-31 RX ADMIN — CLOPIDOGREL 75 MG: 75 TABLET, FILM COATED ORAL at 13:16

## 2017-05-31 RX ADMIN — Medication 10 ML: at 22:00

## 2017-05-31 RX ADMIN — ASPIRIN 81 MG: 81 TABLET, CHEWABLE ORAL at 08:40

## 2017-05-31 RX ADMIN — AMLODIPINE BESYLATE 5 MG: 5 TABLET ORAL at 18:29

## 2017-05-31 RX ADMIN — CALCIUM CARBONATE 1250 MG: 1250 TABLET ORAL at 08:40

## 2017-05-31 RX ADMIN — ENOXAPARIN SODIUM 40 MG: 40 INJECTION SUBCUTANEOUS at 08:40

## 2017-05-31 RX ADMIN — Medication 10 ML: at 08:41

## 2017-05-31 ASSESSMENT — PAIN SCALES - GENERAL: PAINLEVEL_OUTOF10: 0

## 2017-05-31 NOTE — FLOWSHEET NOTE
Visited and prayed with patient at his bedside. Family was not present at the time. Patient was in high spirit and seemed to be doing well. When asked how he was feeling, patient responded. \"I fee better. \"  offered support  Patient said he was raised [employer]ist and a member of [employer]. Patient received sacrament of anointing of the sick. Follow up visits recommended for more spiritual and emotion support. 05/31/17 1414   Encounter Summary   Services provided to: Patient   Support System Family members   Place of [facility] [address], [place]   Continue Visiting (05/31/2017)   Complexity of Encounter Moderate   Length of Encounter 30 minutes   Spiritual Assessment Completed Yes   Routine   Type Follow up   Spiritual/Caodaism   Type Spiritual support   Assessment Calm; Approachable   Intervention Active listening;Nurtured hope;Prayer; Anointing   Outcome Expressed gratitude   Sacraments   Sacrament of Sick-Anointing Anointed

## 2017-05-31 NOTE — PROGRESS NOTES
Smoking Cessation - topics covered   []  Health Risks  []  Benefits of Quitting   []  Smoking Cessation  []  Patient has no history of tobacco use  [x]  Patient is former smoker. [x]  No need for tobacco cessation education. []  Booklet given  []  Patient verbalizes understanding. []  Patient denies need for tobacco cessation education.   Rolf Morataya  8:00 AM

## 2017-05-31 NOTE — PROGRESS NOTES
Physical Therapy    Facility/Department: [facility] NEURO  Initial Assessment    NAME: Minda Garrison  : 1932  MRN: [id number]    Date of Service: 2017      The patient is a 80 y.o. male who is admitted to the hospital for right sided upper and lower extremity weakness. Patient noticed feeling \"off\" one week ago and noticed that his balance was off. This morning he noticed that his  strength in right hand was not strong and noticed that his right leg felt weak. This has never happened to him before. Patient has no medical problems and only takes a baby aspirin every day. He has no other complaints. Denies headaches, blurred vision, chest pain, SOB, abdominal pain, urinary or bowel complaints, nausea, vomiting, fevers, chills.   Patient Diagnosis(es):   Patient Active Problem List    Diagnosis Date Noted    Vitamin D deficiency 2017    Right-sided muscle weakness 2017    Hypertension 2017       Past Medical History:   Diagnosis Date    Uses hearing aid      Past Surgical History:   Procedure Laterality Date    EYE SURGERY      new lens    SHOULDER SURGERY      right         Restrictions  Restrictions/Precautions  Required Braces or Orthoses?: No  Vision/Hearing  Vision: Within Functional Limits  Hearing: Exceptions to [facility]  Hearing Exceptions: Bilateral hearing aid     Subjective  General  Patient assessed for rehabilitation services?: Yes  Family / Caregiver Present: No  Follows Commands: Within Functional Limits  Pain Screening  Patient Currently in Pain: Denies  Vital Signs  Patient Currently in Pain: Denies       Orientation  Orientation  Overall Orientation Status: Within Normal Limits    Social/Functional History  Social/Functional History  Lives With: Alone  Type of Home: House  Home Layout: Two level, Able to Live on Main level with bedroom/bathroom  Home Access: Stairs to enter with rails  Entrance Stairs - Number of Steps: 3  ADL Assistance: Independent  Homemaking Assistance: Independent  Ambulation Assistance: Independent  Transfer Assistance: Independent  Active : Yes  Mode of Transportation: Truck  Occupation: Retired  Objective  AROM RLE (degrees)  RLE AROM: WFL  AROM LLE (degrees)  LLE AROM : WFL  AROM RUE (degrees)  RUE AROM : WFL  AROM LUE (degrees)  LUE AROM : WFL  Strength RLE  Strength RLE: WFL  Strength LLE  Strength LLE: WFL  Strength RUE  Strength RUE: WFL  Comment: Except wrist 3+/5,  4/5  Strength LUE  Strength LUE: WFL  Motor Control  Gross Motor?: Exceptions  Comments: Dysmetria with RUE. Sensation  Overall Sensation Status: WFL  Bed mobility  Rolling to Right: Independent  Supine to Sit: Independent  Sit to Supine: Independent  Scooting: Independent  Transfers  Sit to Stand: Independent  Stand to sit: Independent  Ambulation  Ambulation?: Yes  Ambulation 1  Surface: level tile  Device: No Device  Assistance: Independent  Quality of Gait: Normal kirt. No LOB  Distance: 350 ft     Balance  Posture: Good  Sitting - Static: Good  Sitting - Dynamic: Good  Standing - Static: Good  Standing - Dynamic: Good        Assessment   Assessment: Pt is independent with mobility. Only deficit is with RUE. Will defer to OT for treatment. Prognosis: Good  Decision Making: Medium Complexity  Patient Education: PT POC, OP therapy, hand exercises  No Skilled PT: No PT goals identified  REQUIRES PT FOLLOW UP: Yes  Activity Tolerance  Activity Tolerance: Patient Tolerated treatment well     Discharge Recommendations:   Home independently.       Plan   Plan  Plan Comment: Pt is discharged from PT  Safety Devices  Type of devices: Left in bed, Nurse notified, Call light within reach    G-Code  PT G-Codes  Functional Assessment Tool Used: [place] Tool  Score: 28  Functional Limitation: Mobility: Walking and moving around  Mobility: Walking and Moving Around Current Status (): 0 percent impaired, limited or restricted  Mobility: Walking and Moving Around Goal Status (): 0

## 2017-05-31 NOTE — PROGRESS NOTES
140   K  3.9   CL  101   CO2  24   BUN  20   CREATININE  0.98     ANEMIA STUDIES  No results for input(s): LABIRON, TIBC, FERRITIN, IQOVBGJB67, FOLATE, OCCULTBLD in the last 72 hours. BNP: No results for input(s): BNP in the last 72 hours. PT/INR:   Recent Labs      05/30/17   2103   PROTIME  11.7   INR  1.1     APTT: No results for input(s): APTT in the last 72 hours. CARDIAC ENZYMES: No results for input(s): CKMB, CKMBINDEX, TROPONINI in the last 72 hours. Invalid input(s): CKTOTAL;3  FASTING LIPID PANEL:  Lab Results   Component Value Date    CHOL 153 05/30/2017    HDL 36 (L) 05/30/2017    TRIG 122 05/30/2017     LFTS  Recent Labs      05/30/17   1430   ALKPHOS  81   ALT  7   AST  13   BILITOT  0.71   LABALBU  3.9       AMYLASE/LIPASE/AMMONIA  No results for input(s): AMYLASE, LIPASE, AMMONIA in the last 72 hours. Assessment and Plan:   Principal Problem:    Right-sided muscle weakness  Active Problems:    Hypertension    Vitamin D deficiency    Neurology consult - awaiting recommendations   Patient has not required cardene drip. BP wnl. Continue aspirin. Start Plavix and lipitor. Obtain 2D echo  DVT ppx: lovenox  PT/OT/SW      Electronically signed by Marit Skiff, DO on 5/31/2017 at 7:16 AM    I have discussed the care of Shabana Ch , including pertinent history and exam findings,    today with the resident. I have seen and examined the patient and the key elements of all parts of the encounter have been performed by me . I agree with the assessment, plan and orders as documented by the resident.           Electronically signed by Dexter Silver MD

## 2017-05-31 NOTE — PROGRESS NOTES
Call from contracted radiologist company who read MRIs/MRAs. To inform of results of the readings. Writer calls goldie Ledezma with MD. Stated will inform.

## 2017-05-31 NOTE — CONSULTS
Shave Biopsy Wound Care    Your doctor has performed a shave biopsy today.  A band aid and vaseline ointment has been placed over the site.  This should remain in place for 24 hours.  It is recommended that you keep the area dry for the first 24 hours.  After 24 hours, you may remove the band aid and wash the area with warm soap and water and apply Vaseline jelly.  Many patients prefer to use Neosporin or Bacitracin ointment.  This is acceptable; however, know that you can develop an allergy to this medication even if you have used it safely for years.  It is important to keep the area moist.  Letting it dry out and get air slows healing time, and will worsen the scar.  Band aid is optional after first 24 hours.      If you notice increasing redness, tenderness, pain, or yellow drainage at the biopsy site, please notify your doctor.  These are signs of an infection.    If your biopsy site is bleeding, apply firm pressure for 15 minutes straight.  Repeat for another 15 minutes, if it is still bleeding.   If the surgical site continues to bleed, then please contact your doctor.      [facility]   DERMATOLOGY  [address] [address]   Dept: [phone]   Dept Fax: [phone]         Preventing Skin Cancer  Relaxing in the sun may feel good. But it isnt good for your skin. In fact, being exposed to the suns harmful rays is a major cause of skin cancer. This is a serious disease that can be life-threatening. People of all ages and backgrounds are at risk. But in most cases, skin cancer can be prevented.    Your Role in Prevention  You can act today to help prevent skin cancer. Start by avoiding the suns UV (ultraviolet) rays. And dont use tanning beds, which are no safer than the sun. Taking these steps can help keep you from getting skin cancer. It can also help prevent wrinkles and other sun-induced aging effects. Make sure your children also follow  I had the pleasure of seeing your patient in neurology consultation for his symptoms. As you would recall Emily Rhodes is a 80 y.o. yo male admitted on 5/30/2017. The patient was at his baseline until 5/28/17 when he went to sleep. The next day he woke up and noticed incoordination involving the right upper extremity, difficulty holding onto objects from the right side along with some difficulty in his gait and balance. The patient also noticed some vertigo. Symptoms did not improve when he went to [facility] with the symptoms. NIH stroke scale was 1. The patient has been transferred to [place] [address]. The patient has been taking aspirin 81 mg a day for last 3 weeks. He denies having any previous similar symptoms. Past Medical History:   Diagnosis Date    Uses hearing aid      Past Surgical History:   Procedure Laterality Date    EYE SURGERY      new lens    SHOULDER SURGERY      right     Social History     Social History    Marital status: Single     Spouse name: N/A    Number of children: N/A    Years of education: N/A     Occupational History    Not on file.      Social History Main Topics    Smoking status: Former Smoker    Smokeless tobacco: Not on file    Alcohol use 2.4 oz/week     4 Cans of beer per week    Drug use: No    Sexual activity: Not on file     Other Topics Concern    Not on file     Social History Narrative     The patient has a family history of insignificant  ROS:  Constitutional Negative for fever and chills   HEENT Negative for ear discharge, ear pain, nosebleed   Eyes Negative for photophobia, pain and discharge   Respiratory Negative for hemoptysis and sputum   Cardiovascular Negative for orthopnea, claudication and PND   Gastrointestinal Negative for abdominal pain, diarrhea, blood in stool   Musculoskeletal Negative for joint pain, negative for myalgia   Skin Negative for rash or itching   Endo/heme/allergies Negative for these safeguards. Now is the time to start taking preventive steps against skin cancer.  When You Are Outdoors  Protect your skin when you go outdoors during the day. Take precautions whenever you go out to eat, run errands by car or on foot, or do any outdoor activity. There isnt just one easy way to protect your skin. Its best to follow all of these steps:  · Wear tightly woven clothing that covers your skin. Put on a wide-brimmed hat to protect your face, ears, and scalp.  · Watch the clock. Try to avoid the sun between 10 a.m. and 4 p.m., when it is strongest.  · Head for the shade or create your own. Use an umbrella when sitting or strolling.  · Know that the suns rays can reflect off sand, water, and snow. This can harm your skin. Take extra care when you are near reflective surfaces.  · Keep in mind that even when the weather is hazy or cloudy, your skin can be exposed to strong UV rays.  · Shield your skin with sunscreen. Also, apply sunscreen to your childrens skin.  Tips for Using Sunscreen  To help prevent skin cancer, choose the right sunscreen and use it correctly. Try the following tips:  · Choose a sunscreen that has a sun protection factor (SPF) of at least 15. For the best protection, an SPF of at least 30 is preferred. Also, choose a sunscreen labeled broad spectrum. This will shield you from both UVA and UVB (ultraviolet A and B) rays.  · If one brand irritates your skin, try another, particularly ones without fragrance.  · Use a water-resistant sunscreen if swimming or sweating.  · Reapply sunscreen every 2 hours. If youre active, do this more often.  · Cover any sun-exposed skin, from your face to your feet. Dont forget your ears and your lips.  · Know that while sunscreen helps protect you, it isnt enough. You should also wear protective clothing. And try to stay out of the sun as much as you can, especially from 10 a.m. to 4 p.m.  © 3835-6971 [employer]. [address] [place] [address]  [place] [address]. All rights reserved. This information is not intended as a substitute for professional medical care. Always follow your healthcare professional's instructions.

## 2017-06-01 VITALS
HEART RATE: 78 BPM | TEMPERATURE: 96.2 F | WEIGHT: 182.5 LBS | HEIGHT: 75 IN | BODY MASS INDEX: 22.69 KG/M2 | OXYGEN SATURATION: 97 % | RESPIRATION RATE: 16 BRPM | DIASTOLIC BLOOD PRESSURE: 64 MMHG | SYSTOLIC BLOOD PRESSURE: 146 MMHG

## 2017-06-01 PROBLEM — I63.9 ISCHEMIC STROKE (HCC): Status: ACTIVE | Noted: 2017-06-01

## 2017-06-01 PROCEDURE — G9169 MEMORY GOAL STATUS: HCPCS

## 2017-06-01 PROCEDURE — 6370000000 HC RX 637 (ALT 250 FOR IP): Performed by: STUDENT IN AN ORGANIZED HEALTH CARE EDUCATION/TRAINING PROGRAM

## 2017-06-01 PROCEDURE — 6370000000 HC RX 637 (ALT 250 FOR IP): Performed by: PSYCHIATRY & NEUROLOGY

## 2017-06-01 PROCEDURE — 97535 SELF CARE MNGMENT TRAINING: CPT

## 2017-06-01 PROCEDURE — G8988 SELF CARE GOAL STATUS: HCPCS

## 2017-06-01 PROCEDURE — G9170 MEMORY D/C STATUS: HCPCS

## 2017-06-01 PROCEDURE — G9168 MEMORY CURRENT STATUS: HCPCS

## 2017-06-01 PROCEDURE — 99238 HOSP IP/OBS DSCHRG MGMT 30/<: CPT | Performed by: INTERNAL MEDICINE

## 2017-06-01 PROCEDURE — 93306 TTE W/DOPPLER COMPLETE: CPT

## 2017-06-01 PROCEDURE — G8987 SELF CARE CURRENT STATUS: HCPCS

## 2017-06-01 PROCEDURE — 99232 SBSQ HOSP IP/OBS MODERATE 35: CPT | Performed by: NURSE PRACTITIONER

## 2017-06-01 PROCEDURE — 97165 OT EVAL LOW COMPLEX 30 MIN: CPT

## 2017-06-01 PROCEDURE — 92523 SPEECH SOUND LANG COMPREHEN: CPT

## 2017-06-01 PROCEDURE — G8989 SELF CARE D/C STATUS: HCPCS

## 2017-06-01 PROCEDURE — 6360000002 HC RX W HCPCS: Performed by: STUDENT IN AN ORGANIZED HEALTH CARE EDUCATION/TRAINING PROGRAM

## 2017-06-01 PROCEDURE — 2580000003 HC RX 258: Performed by: STUDENT IN AN ORGANIZED HEALTH CARE EDUCATION/TRAINING PROGRAM

## 2017-06-01 RX ORDER — POTASSIUM CHLORIDE 7.45 MG/ML
10 INJECTION INTRAVENOUS PRN
Status: DISCONTINUED | OUTPATIENT
Start: 2017-06-01 | End: 2017-06-01 | Stop reason: HOSPADM

## 2017-06-01 RX ORDER — CLOPIDOGREL BISULFATE 75 MG/1
75 TABLET ORAL DAILY
Qty: 30 TABLET | Refills: 3 | Status: SHIPPED | OUTPATIENT
Start: 2017-06-01 | End: 2017-07-05 | Stop reason: SDUPTHER

## 2017-06-01 RX ORDER — ASPIRIN 81 MG/1
81 TABLET, CHEWABLE ORAL DAILY
Qty: 30 TABLET | Refills: 3 | Status: SHIPPED | OUTPATIENT
Start: 2017-06-01 | End: 2021-06-10

## 2017-06-01 RX ORDER — POTASSIUM CHLORIDE 20 MEQ/1
40 TABLET, EXTENDED RELEASE ORAL PRN
Status: DISCONTINUED | OUTPATIENT
Start: 2017-06-01 | End: 2017-06-01 | Stop reason: HOSPADM

## 2017-06-01 RX ORDER — POTASSIUM CHLORIDE 20MEQ/15ML
40 LIQUID (ML) ORAL PRN
Status: DISCONTINUED | OUTPATIENT
Start: 2017-06-01 | End: 2017-06-01 | Stop reason: HOSPADM

## 2017-06-01 RX ORDER — ATORVASTATIN CALCIUM 40 MG/1
40 TABLET, FILM COATED ORAL NIGHTLY
Qty: 30 TABLET | Refills: 3 | Status: SHIPPED | OUTPATIENT
Start: 2017-06-01 | End: 2017-07-05 | Stop reason: SDUPTHER

## 2017-06-01 RX ADMIN — CLOPIDOGREL 75 MG: 75 TABLET, FILM COATED ORAL at 14:05

## 2017-06-01 RX ADMIN — CALCIUM CARBONATE 1250 MG: 1250 TABLET ORAL at 09:14

## 2017-06-01 RX ADMIN — Medication 10 ML: at 09:13

## 2017-06-01 RX ADMIN — ENOXAPARIN SODIUM 40 MG: 40 INJECTION SUBCUTANEOUS at 09:11

## 2017-06-01 RX ADMIN — ASPIRIN 81 MG: 81 TABLET, CHEWABLE ORAL at 09:11

## 2017-06-01 NOTE — PROGRESS NOTES
05/30/2017    INR 1.1 05/30/2017    LABA1C 4.3 05/30/2017     Vit D           28.5 (L)      Diagnostic data reviewed:  CT HEAD (5/30/2017): No acute process      BRAIN MRI (5/31/2017): Acute ischemic lacunar infarct in the posterior left frontal corona radiata. Mild chronic white matter microvascular ischemic changes. Remote lacunar infarct in the posterior left external capsule       MRA HEAD & NECK (5/31/2017): Normal      ECHO (6/1/2017): EF 55%. Diastolic dysfunction. Mild AR                          Impression and Plan: Mr. Rain Millan is a 80 y.o. male with   Acute lacunar infarct in posterior L frontal corona radiata; continue ASA 81 mg QD + Plavix 75 mg QD x 3 weeks & then continue Plavix 75 mg QD; continue Lipitor 40 mg HS. Pt has R sided F-T-N ataxia; otherwise no new c/o    Continue PT/OT; he walked 350' without any device; they recommended home independently    Comorbid condition - HTN    Needs to F/U with Dr. Marah Tidwell on 7/5/2017    Its okay to DC from neurological stand point    We will sign off at this time. Please, call with any questions or concerns.  Thank you

## 2017-06-01 NOTE — PROGRESS NOTES
Limits         Cognition:      Orientation  Overall Orientation Status: Within Normal Limits  Attention  Attention: Within Functional Limits  Memory  Memory: Exceptions to [facility]  Short-term Memory: Mild (1/3)  Working Memory: Mild (3/3, 3/5, 3/3)  Problem Solving  Problem Solving: Within Functional Limits  Safety/Judgement  Safety/Judgement: Within Functional Limits  Word Generation: Within Functional Limits  Verbal Sequencing: Within Functional Limits  Thought Organization: Within Functional Limits       Prognosis:  Speech Therapy Prognosis  Prognosis: Good  Individuals consulted  Consulted and agree with results and recommendations: Patient    Education:  Patient Education: yes  Patient Education Response: Verbalizes understanding    G-Code:  SLP G-Codes  Functional Limitations: Memory  Memory Current Status (): At least 1 percent but less than 20 percent impaired, limited or restricted  Memory Goal Status (): At least 1 percent but less than 20 percent impaired, limited or restricted  Memory Discharge Status (): At least 1 percent but less than 20 percent impaired, limited or restricted         Therapy Time:   Individual Concurrent Group Co-treatment   Time In [address]         Time Out 1431         Minutes 17               Completed by Kieran Vargas,  Clinician    Co-signed by Cece Grant A.CCC/SLP    6/1/2017 2:43 PM

## 2017-06-01 NOTE — PROGRESS NOTES
identified  REQUIRES OT FOLLOW UP: No  Activity Tolerance  Activity Tolerance: Patient Tolerated treatment well  Safety Devices  Safety Devices in place: Yes  Type of devices: Nurse notified;Call light within reach; Left in bed  OT Equipment Recommendations  Equipment Needed: Yes  Mobility Devices: ADL Assistive Devices  ADL Assistive Devices: Shower Chair with back  Other: recommend shower chair for safety in home setting        Discharge Recommendations:        Plan        G-Code  OT G-codes  Functional Assessment Tool Used: barthel ADL index  Score: 20/20  Functional Limitation: Self care  Self Care Current Status (): 0 percent impaired, limited or restricted  Self Care Goal Status (): 0 percent impaired, limited or restricted  Self Care Discharge Status (): 0 percent impaired, limited or restricted  OutComes Score                                                     Goals          Therapy Time   Individual Concurrent Group Co-treatment   Time In 1505         Time Out 1530         Minutes 25          pt supine upon arrival, transferred to EOB independently with good sitting balance. Pt able to don bilat footies with good balance, able to reach feet easily. Pt assisted to bathroom for independent toileting. No LOB during functional mobility. Pt demo good safety awareness with transfers. No acute OT goals identified. Pt safe to return home following discharge.          Thersa Hidden, OTR/L

## 2017-06-02 NOTE — FLOWSHEET NOTE
Patient Health Questionnaire-9  (PHQ-9)    Over the last 2 weeks, how often have you been bothered by any of the following problems? Not at all Several days More than half the days Nearly every day    1. Little interest or pleasure in doing things  0x 1 2 3   2. Feeling down, depressed, or hopeless 0x 1 2 3   3. Trouble falling or staying asleep, or sleeping too much  0x 1 2 3   4. Feeling tired or having little energy 0x 1 2 3   5. Poor appetite or overeating 0x 1 2 3   6. Feeling bad about yourself- or that you are a failure or have let yourself or your family down 0x 1 2 3   7. Trouble concentrating on things, such as reading the newspaper or watching television 0x 1 2 3   8. Moving or speaking so slowly that other people could have noticed? Or the opposite- being so fidgety or restless that you have been moving around a lot more than usual.  0x 1 2 3   9. Thought that you would be better off dead or of hurting yourself I some way  0x 1 2 3                                                                                   Column total  _____   ____  _____  _____                                                                                                                                                                                               Total score ______0                             If you checked off any problems, how difficult have these problems made it for you to do your work, take care of things at home, or get along with other people?    Not difficult at all  ________    Somewhat difficult________    Very difficult          ________    Extremely difficult_________           PATIENT HEALTH QUESTIONNAIRE PHQ-9 FOR DEPRESSION      USING PHQ-9 DIAGNOSIS AND SCORE FOR INITIAL TREATMENT SELECTION  --A depression diagnosis that warrants treatment or treatment change, needs at least one of the first two questions endorsed as positive  (little pleasure, feeling depressed) indicating the symptom has

## 2017-06-06 ENCOUNTER — HOSPITAL ENCOUNTER (OUTPATIENT)
Dept: PHYSICAL THERAPY | Age: 82
Setting detail: THERAPIES SERIES
Discharge: HOME | End: 2017-06-06
Payer: MEDICARE

## 2017-06-06 PROCEDURE — G8979 MOBILITY GOAL STATUS: HCPCS

## 2017-06-06 PROCEDURE — 97110 THERAPEUTIC EXERCISES: CPT

## 2017-06-06 PROCEDURE — G8978 MOBILITY CURRENT STATUS: HCPCS

## 2017-06-06 PROCEDURE — 97112 NEUROMUSCULAR REEDUCATION: CPT

## 2017-06-06 PROCEDURE — 97162 PT EVAL MOD COMPLEX 30 MIN: CPT

## 2017-06-08 ENCOUNTER — HOSPITAL ENCOUNTER (OUTPATIENT)
Dept: PHYSICAL THERAPY | Age: 82
Setting detail: THERAPIES SERIES
Discharge: HOME | End: 2017-06-08
Payer: MEDICARE

## 2017-06-08 PROCEDURE — 97110 THERAPEUTIC EXERCISES: CPT

## 2017-06-13 ENCOUNTER — HOSPITAL ENCOUNTER (OUTPATIENT)
Dept: PHYSICAL THERAPY | Age: 82
Setting detail: THERAPIES SERIES
Discharge: HOME | End: 2017-06-13
Payer: MEDICARE

## 2017-06-13 PROCEDURE — 97110 THERAPEUTIC EXERCISES: CPT

## 2017-06-16 ENCOUNTER — APPOINTMENT (OUTPATIENT)
Dept: PHYSICAL THERAPY | Age: 82
End: 2017-06-16
Payer: MEDICARE

## 2017-06-16 ENCOUNTER — HOSPITAL ENCOUNTER (OUTPATIENT)
Dept: OCCUPATIONAL THERAPY | Age: 82
Setting detail: THERAPIES SERIES
Discharge: HOME | End: 2017-06-16
Payer: MEDICARE

## 2017-06-16 PROCEDURE — 97166 OT EVAL MOD COMPLEX 45 MIN: CPT

## 2017-06-16 PROCEDURE — G8985 CARRY GOAL STATUS: HCPCS

## 2017-06-16 PROCEDURE — 97112 NEUROMUSCULAR REEDUCATION: CPT

## 2017-06-16 PROCEDURE — 97110 THERAPEUTIC EXERCISES: CPT

## 2017-06-16 PROCEDURE — G8984 CARRY CURRENT STATUS: HCPCS

## 2017-06-19 ENCOUNTER — HOSPITAL ENCOUNTER (OUTPATIENT)
Dept: OCCUPATIONAL THERAPY | Age: 82
Setting detail: THERAPIES SERIES
Discharge: HOME | End: 2017-06-19
Payer: MEDICARE

## 2017-06-19 PROCEDURE — 97110 THERAPEUTIC EXERCISES: CPT

## 2017-06-19 PROCEDURE — 97112 NEUROMUSCULAR REEDUCATION: CPT

## 2017-06-20 ENCOUNTER — HOSPITAL ENCOUNTER (OUTPATIENT)
Dept: PHYSICAL THERAPY | Age: 82
Setting detail: THERAPIES SERIES
Discharge: HOME | End: 2017-06-20
Payer: MEDICARE

## 2017-06-22 ENCOUNTER — APPOINTMENT (OUTPATIENT)
Dept: PHYSICAL THERAPY | Age: 82
End: 2017-06-22
Payer: MEDICARE

## 2017-06-23 ENCOUNTER — HOSPITAL ENCOUNTER (OUTPATIENT)
Dept: OCCUPATIONAL THERAPY | Age: 82
Setting detail: THERAPIES SERIES
Discharge: HOME | End: 2017-06-23
Payer: MEDICARE

## 2017-06-23 PROCEDURE — 97112 NEUROMUSCULAR REEDUCATION: CPT

## 2017-06-23 PROCEDURE — 97110 THERAPEUTIC EXERCISES: CPT

## 2017-06-26 ENCOUNTER — HOSPITAL ENCOUNTER (OUTPATIENT)
Dept: OCCUPATIONAL THERAPY | Age: 82
Setting detail: THERAPIES SERIES
Discharge: HOME | End: 2017-06-26
Payer: MEDICARE

## 2017-06-26 PROCEDURE — G0283 ELEC STIM OTHER THAN WOUND: HCPCS

## 2017-06-26 PROCEDURE — 97110 THERAPEUTIC EXERCISES: CPT

## 2017-06-27 ENCOUNTER — APPOINTMENT (OUTPATIENT)
Dept: PHYSICAL THERAPY | Age: 82
End: 2017-06-27
Payer: MEDICARE

## 2017-06-30 ENCOUNTER — HOSPITAL ENCOUNTER (OUTPATIENT)
Dept: OCCUPATIONAL THERAPY | Age: 82
Setting detail: THERAPIES SERIES
Discharge: HOME | End: 2017-06-30
Payer: MEDICARE

## 2017-06-30 PROCEDURE — 97110 THERAPEUTIC EXERCISES: CPT

## 2017-06-30 PROCEDURE — G0283 ELEC STIM OTHER THAN WOUND: HCPCS

## 2017-07-03 ENCOUNTER — HOSPITAL ENCOUNTER (OUTPATIENT)
Dept: OCCUPATIONAL THERAPY | Age: 82
Setting detail: THERAPIES SERIES
Discharge: HOME | End: 2017-07-03
Payer: MEDICARE

## 2017-07-03 PROCEDURE — 97110 THERAPEUTIC EXERCISES: CPT

## 2017-07-03 PROCEDURE — 97530 THERAPEUTIC ACTIVITIES: CPT

## 2017-07-03 PROCEDURE — G0283 ELEC STIM OTHER THAN WOUND: HCPCS

## 2017-07-05 ENCOUNTER — OFFICE VISIT (OUTPATIENT)
Dept: NEUROLOGY | Age: 82
End: 2017-07-05
Payer: MEDICARE

## 2017-07-05 VITALS
HEIGHT: 75 IN | HEART RATE: 63 BPM | WEIGHT: 200.8 LBS | BODY MASS INDEX: 24.97 KG/M2 | SYSTOLIC BLOOD PRESSURE: 144 MMHG | DIASTOLIC BLOOD PRESSURE: 75 MMHG

## 2017-07-05 DIAGNOSIS — I63.412 CEREBRAL INFARCTION DUE TO EMBOLISM OF LEFT MIDDLE CEREBRAL ARTERY (HCC): Primary | ICD-10-CM

## 2017-07-05 PROCEDURE — G8427 DOCREV CUR MEDS BY ELIG CLIN: HCPCS | Performed by: PSYCHIATRY & NEUROLOGY

## 2017-07-05 PROCEDURE — 1036F TOBACCO NON-USER: CPT | Performed by: PSYCHIATRY & NEUROLOGY

## 2017-07-05 PROCEDURE — 99215 OFFICE O/P EST HI 40 MIN: CPT | Performed by: PSYCHIATRY & NEUROLOGY

## 2017-07-05 PROCEDURE — 4040F PNEUMOC VAC/ADMIN/RCVD: CPT | Performed by: PSYCHIATRY & NEUROLOGY

## 2017-07-05 PROCEDURE — 1123F ACP DISCUSS/DSCN MKR DOCD: CPT | Performed by: PSYCHIATRY & NEUROLOGY

## 2017-07-05 PROCEDURE — G8419 CALC BMI OUT NRM PARAM NOF/U: HCPCS | Performed by: PSYCHIATRY & NEUROLOGY

## 2017-07-05 PROCEDURE — G8598 ASA/ANTIPLAT THER USED: HCPCS | Performed by: PSYCHIATRY & NEUROLOGY

## 2017-07-05 RX ORDER — CLOPIDOGREL BISULFATE 75 MG/1
75 TABLET ORAL DAILY
Qty: 90 TABLET | Refills: 3 | Status: SHIPPED | OUTPATIENT
Start: 2017-07-05 | End: 2017-07-05 | Stop reason: SDUPTHER

## 2017-07-05 RX ORDER — CLOPIDOGREL BISULFATE 75 MG/1
75 TABLET ORAL DAILY
Qty: 90 TABLET | Refills: 3 | Status: ON HOLD | OUTPATIENT
Start: 2017-07-05 | End: 2021-11-07 | Stop reason: SDUPTHER

## 2017-07-05 RX ORDER — ATORVASTATIN CALCIUM 40 MG/1
40 TABLET, FILM COATED ORAL NIGHTLY
Qty: 90 TABLET | Refills: 3 | Status: SHIPPED | OUTPATIENT
Start: 2017-07-05

## 2017-07-05 RX ORDER — ATORVASTATIN CALCIUM 40 MG/1
40 TABLET, FILM COATED ORAL NIGHTLY
Qty: 90 TABLET | Refills: 3 | Status: SHIPPED | OUTPATIENT
Start: 2017-07-05 | End: 2017-07-05 | Stop reason: SDUPTHER

## 2017-07-07 ENCOUNTER — HOSPITAL ENCOUNTER (OUTPATIENT)
Dept: OCCUPATIONAL THERAPY | Age: 82
Setting detail: THERAPIES SERIES
Discharge: HOME | End: 2017-07-07
Payer: MEDICARE

## 2017-07-07 PROCEDURE — 97112 NEUROMUSCULAR REEDUCATION: CPT

## 2017-07-07 PROCEDURE — 97110 THERAPEUTIC EXERCISES: CPT

## 2017-07-07 PROCEDURE — G0283 ELEC STIM OTHER THAN WOUND: HCPCS

## 2017-07-24 ENCOUNTER — HOSPITAL ENCOUNTER (OUTPATIENT)
Dept: OCCUPATIONAL THERAPY | Age: 82
Setting detail: THERAPIES SERIES
Discharge: HOME | End: 2017-07-24
Payer: MEDICARE

## 2017-07-24 PROCEDURE — G0283 ELEC STIM OTHER THAN WOUND: HCPCS

## 2017-07-24 PROCEDURE — 97110 THERAPEUTIC EXERCISES: CPT

## 2017-07-24 PROCEDURE — 97530 THERAPEUTIC ACTIVITIES: CPT

## 2017-07-28 ENCOUNTER — HOSPITAL ENCOUNTER (OUTPATIENT)
Dept: OCCUPATIONAL THERAPY | Age: 82
Setting detail: THERAPIES SERIES
Discharge: HOME | End: 2017-07-28
Payer: MEDICARE

## 2017-07-28 PROCEDURE — 97112 NEUROMUSCULAR REEDUCATION: CPT

## 2017-07-28 PROCEDURE — G0283 ELEC STIM OTHER THAN WOUND: HCPCS

## 2017-07-28 PROCEDURE — G8984 CARRY CURRENT STATUS: HCPCS

## 2017-07-28 PROCEDURE — G8985 CARRY GOAL STATUS: HCPCS

## 2017-07-28 PROCEDURE — 97110 THERAPEUTIC EXERCISES: CPT

## 2017-07-28 RX ORDER — CLOPIDOGREL BISULFATE 75 MG/1
TABLET ORAL
Qty: 30 TABLET | Refills: 1 | OUTPATIENT
Start: 2017-07-28

## 2017-07-31 ENCOUNTER — APPOINTMENT (OUTPATIENT)
Dept: OCCUPATIONAL THERAPY | Age: 82
End: 2017-07-31
Payer: MEDICARE

## 2017-07-31 ENCOUNTER — HOSPITAL ENCOUNTER (OUTPATIENT)
Dept: OCCUPATIONAL THERAPY | Age: 82
Setting detail: THERAPIES SERIES
Discharge: HOME | End: 2017-07-31
Payer: MEDICARE

## 2017-07-31 PROCEDURE — G0283 ELEC STIM OTHER THAN WOUND: HCPCS

## 2017-07-31 PROCEDURE — 97110 THERAPEUTIC EXERCISES: CPT

## 2017-08-04 ENCOUNTER — HOSPITAL ENCOUNTER (OUTPATIENT)
Dept: OCCUPATIONAL THERAPY | Age: 82
Setting detail: THERAPIES SERIES
Discharge: HOME | End: 2017-08-04
Payer: MEDICARE

## 2017-08-04 PROCEDURE — 97110 THERAPEUTIC EXERCISES: CPT

## 2017-08-04 PROCEDURE — G0283 ELEC STIM OTHER THAN WOUND: HCPCS

## 2017-08-04 PROCEDURE — 97112 NEUROMUSCULAR REEDUCATION: CPT

## 2017-08-07 ENCOUNTER — HOSPITAL ENCOUNTER (OUTPATIENT)
Dept: OCCUPATIONAL THERAPY | Age: 82
Setting detail: THERAPIES SERIES
Discharge: HOME | End: 2017-08-07
Payer: MEDICARE

## 2017-08-07 PROCEDURE — 97530 THERAPEUTIC ACTIVITIES: CPT

## 2017-08-07 PROCEDURE — G0283 ELEC STIM OTHER THAN WOUND: HCPCS

## 2017-08-07 PROCEDURE — 97110 THERAPEUTIC EXERCISES: CPT

## 2017-08-11 ENCOUNTER — HOSPITAL ENCOUNTER (OUTPATIENT)
Dept: OCCUPATIONAL THERAPY | Age: 82
Setting detail: THERAPIES SERIES
Discharge: HOME | End: 2017-08-11
Payer: MEDICARE

## 2017-08-11 PROCEDURE — 97110 THERAPEUTIC EXERCISES: CPT

## 2017-08-11 PROCEDURE — G0283 ELEC STIM OTHER THAN WOUND: HCPCS

## 2017-08-11 PROCEDURE — 97113 AQUATIC THERAPY/EXERCISES: CPT

## 2018-02-19 NOTE — DISCHARGE SUMMARY
Phone: Miya          Fax: [phone]                            Outpatient Physical Therapy                                                                    Discharge Summary    Patient: Andie Mandujano  : 1932  CSN #: [id number]   Referring physician: No admitting provider for patient encounter. Referring Practitioner: Dr. Mele Black      Diagnosis: Stroke       Date Treatment Initiated: 2017  Date of Last Treatment: 2017      PT Visit Information  Onset Date: 17  PT Insurance Information: Medicare   Total # of Visits Approved: 12  Total # of Visits to Date: 3  Plan of Care/Certification Expiration Date: 17  No Show: 0  Canceled Appointment: 0      Frequency/Duration    2 times per week    6 weeks      Treatment Received  [x]HP/CP      []Electrical Stim   [x]Therapeutic Exercise      [x]Gait Training  []Aquatics   []Ultrasound         [x]Patient Education/HEP   [x]Manual Therapy  []Traction    [x]Neuro-jolie        []Soft Tissue Mobs            []Home TENS  []Iontophoresis    []Orthotic casting/fitting      []Dry Needling    Assessment  Assessment: Patient completed 3 PT visits with last visit being on 2017. At the time of last visit patient refused to complete LE strengthening and balance tasks stating \"legs have been this way a long time and you wont fix them\". Therapist educated patient on purpose/benefit of strenghtening with poor understanding. Patient preferred to work on UE strengthening vs LE strengthening and was referred to OT.  Patient is being D/C at this time per patient request.        Goals  Short term goals  Time Frame for Short term goals: 3 weeks   Short term goal 1: Initiate HEP with good understanding-met   Short term goal 2: Patient will demonstrate the ability to complete 10 sit to stands in 30secs without usage of UE in order to increase B LE strength,     Long term goals  Time Frame for Long term goals : 6 weeks Long term goal 1: Patient will demonstrate the ability to complete TUG in 12secs or less without assistive device with fair (+) balance in order to reduce fall risk. Long term goal 2: Patient will demonstrate the ability to complete NBOS EC independently 7secs or greater in order to improve dynamic balance to ease ambulating over uneven surfaces   Long term goal 3: Patient will demonstrate R UE strength 4+/5 or greater in order to ease lifting/carrying objects   Long term goal 4: Patient will demonstrate the ability to ambulate 115pul4 with fair (+) balance independently without AD and without seated rest breaks in order to return to PLOF.        Reason for Discharge  [] Goals Achieved                       [] Poor Follow Through/Attendance                  [] Optimal Function Achieved     [x] Patient Discharged Self    [] Hospitalization                         [] Physician discharge      Thank you for this referral      Felicia Urbina PT, DPT                    Date: 2/19/2018

## 2018-04-24 ENCOUNTER — TELEPHONE (OUTPATIENT)
Dept: NEUROLOGY | Age: 83
End: 2018-04-24

## 2018-07-25 ENCOUNTER — APPOINTMENT (OUTPATIENT)
Dept: CT IMAGING | Age: 83
End: 2018-07-25
Payer: MEDICARE

## 2018-07-25 ENCOUNTER — APPOINTMENT (OUTPATIENT)
Dept: GENERAL RADIOLOGY | Age: 83
End: 2018-07-25
Payer: MEDICARE

## 2018-07-25 ENCOUNTER — HOSPITAL ENCOUNTER (EMERGENCY)
Age: 83
Discharge: HOME | End: 2018-07-25
Attending: EMERGENCY MEDICINE
Payer: MEDICARE

## 2018-07-25 VITALS
OXYGEN SATURATION: 99 % | HEART RATE: 85 BPM | RESPIRATION RATE: 16 BRPM | SYSTOLIC BLOOD PRESSURE: 129 MMHG | DIASTOLIC BLOOD PRESSURE: 76 MMHG | TEMPERATURE: 98.8 F

## 2018-07-25 DIAGNOSIS — M25.462 KNEE EFFUSION, LEFT: Primary | ICD-10-CM

## 2018-07-25 PROCEDURE — 73700 CT LOWER EXTREMITY W/O DYE: CPT

## 2018-07-25 PROCEDURE — 99284 EMERGENCY DEPT VISIT MOD MDM: CPT

## 2018-07-25 PROCEDURE — 73562 X-RAY EXAM OF KNEE 3: CPT

## 2018-07-25 PROCEDURE — 6370000000 HC RX 637 (ALT 250 FOR IP): Performed by: EMERGENCY MEDICINE

## 2018-07-25 RX ORDER — HYDROCODONE BITARTRATE AND ACETAMINOPHEN 5; 325 MG/1; MG/1
1 TABLET ORAL EVERY 6 HOURS PRN
Qty: 10 TABLET | Refills: 0 | Status: SHIPPED | OUTPATIENT
Start: 2018-07-25 | End: 2018-08-01

## 2018-07-25 RX ORDER — HYDROCODONE BITARTRATE AND ACETAMINOPHEN 5; 325 MG/1; MG/1
1 TABLET ORAL ONCE
Status: COMPLETED | OUTPATIENT
Start: 2018-07-25 | End: 2018-07-25

## 2018-07-25 RX ADMIN — HYDROCODONE BITARTRATE AND ACETAMINOPHEN 1 TABLET: 5; 325 TABLET ORAL at 15:30

## 2018-07-25 ASSESSMENT — PAIN DESCRIPTION - ORIENTATION: ORIENTATION: LEFT

## 2018-07-25 ASSESSMENT — PAIN DESCRIPTION - PAIN TYPE: TYPE: ACUTE PAIN

## 2018-07-25 ASSESSMENT — PAIN SCALES - GENERAL
PAINLEVEL_OUTOF10: 3
PAINLEVEL_OUTOF10: 6
PAINLEVEL_OUTOF10: 3

## 2018-07-25 ASSESSMENT — PAIN DESCRIPTION - FREQUENCY: FREQUENCY: CONTINUOUS

## 2018-07-25 ASSESSMENT — PAIN DESCRIPTION - DESCRIPTORS: DESCRIPTORS: SHARP

## 2018-07-25 ASSESSMENT — PAIN DESCRIPTION - LOCATION: LOCATION: KNEE

## 2018-07-25 NOTE — ED PROVIDER NOTES
Problem Relation Age of Onset    Cancer Mother         bladder    Heart Disease Father     High Blood Pressure Brother     Stroke Brother     High Blood Pressure Brother     Stroke Brother         SOCIAL HISTORY       Social History     Social History    Marital status: Single     Spouse name: N/A    Number of children: N/A    Years of education: N/A     Social History Main Topics    Smoking status: Former Smoker    Smokeless tobacco: Never Used    Alcohol use 2.4 oz/week     4 Cans of beer per week    Drug use: No    Sexual activity: Not Asked     Other Topics Concern    None     Social History Narrative    None       REVIEW OF SYSTEMS       Review of Systems   Musculoskeletal:        Left knee pain and swelling       Except as noted above the remainder of the review of systems was reviewed and is negative. PHYSICAL EXAM    (up to 7 for level 4, 8 or more for level 5)     ED Triage Vitals [07/25/18 1519]   BP Temp Temp Source Pulse Resp SpO2 Height Weight   129/76 98.8 °F (37.1 °C) Oral 85 16 99 % -- --       Physical Exam   Constitutional: He is oriented to person, place, and time. He appears well-developed and well-nourished. HENT:   Head: Normocephalic. Eyes: Conjunctivae and EOM are normal. Pupils are equal, round, and reactive to light. Neck: Neck supple. No tracheal deviation present. Pulmonary/Chest: Effort normal.   No respiratory distress or tachypnea   Musculoskeletal: Normal range of motion. Significant swelling and warmth of the left knee; no erythema. Range of motion decreased due to pain. Patella is midline. No ligamentous instability. Neurological: He is alert and oriented to person, place, and time. No cranial nerve deficit. Skin: Skin is warm and dry. Nursing note and vitals reviewed.       DIAGNOSTIC RESULTS     EKG: (none if blank)  All EKG's are interpreted by the Emergency Department Physician who either signs or Co-signs this chart in the absence of a cardiologist.        RADIOLOGY: (none if blank)   Interpretation per the Radiologist below, if available at the time of this note:    CT KNEE LEFT WO CONTRAST   Final Result   NO ACUTE FRACTURE INVOLVING THE LEFT KNEE. MILD DEGENERATIVE CHANGES. CARON-STIEDA CALCIFICATION. JOINT EFFUSION. SMALL JOINT CALCIFICATIONS. POSSIBLE FAINT CHONDROCALCINOSIS. XR KNEE LEFT (3 VIEWS)   Final Result   NO ACUTE FRACTURE OR DISLOCATION INVOLVING THE LEFT KNEE. MILD DEGENERATIVE CHANGES. PROBABLE CARON-STIEDA CALCIFICATION. MILD DEGENERATIVE CHANGES. SMALL JOINT CALCIFICATIONS. SMALL JOINT EFFUSION. LABS:  Labs Reviewed - No data to display    All other labs were within normal range or not returned as of this dictation. EMERGENCY DEPARTMENT COURSE and Medical Decision Making:     MDM/  ED Course as of Jul 25 1717 Wed Jul 25, 2018 1716 Ambulates well with a walker  [AB]      ED Course User Index  [AB] Sabi De Souza, DO       The patient's presentation is not consistent with a tibiofibular dislocation and therefore I do not suspect popliteal vessel injury. I do suspect a ligamentous injury, so she given decreased range of motion, specifically with attempting to extend the leg. Distal sensation capillary refill are intact. We'll place the patient in knee immobilizer, on a walker and he is referred to orthopedics for follow-up.     Strict return precautions and follow up instructions were discussed with the patient with which the patient agrees    ED Medications administered this visit:    Medications   HYDROcodone-acetaminophen (NORCO) 5-325 MG per tablet 1 tablet (1 tablet Oral Given 7/25/18 1530)       CONSULTS: (None if blank)  IP CONSULT TO SOCIAL WORK    Procedures: (None if blank)       CLINICAL IMPRESSION      1. Knee effusion, left          DISPOSITION/PLAN   DISPOSITION        PATIENT REFERRED TO:  Ziyad Cagle MD  [address] [facility]  [phone]    In 2 days        DISCHARGE MEDICATIONS:  New Prescriptions    HYDROCODONE-ACETAMINOPHEN (NORCO) 5-325 MG PER TABLET    Take 1 tablet by mouth every 6 hours as needed for Pain for up to 7 days. .              (Please note that portions of this note were completed with a voice recognition program.  Efforts were made to edit the dictations but occasionally words are mis-transcribed.)      Cindy Goodwin DO (electronically signed)  Attending Emergency Department Provider         Zoe Martines DO  07/25/18 Dave Mendoza DO  07/25/18 [id number]

## 2018-07-25 NOTE — ED NOTES
Bed: 05  Expected date:   Expected time:   Means of arrival:   Comments:  vito Alvarez RN  07/25/18 [id number]

## 2018-07-26 NOTE — PROGRESS NOTES
Called the patient about home health. Explain he will need to call Dr. Ken Saleh to order it since he is his doctor. Patient stated understanding. Dr. Ken Saleh out of office today.     TERESITA Soto

## 2018-08-17 ENCOUNTER — APPOINTMENT (OUTPATIENT)
Dept: GENERAL RADIOLOGY | Age: 83
End: 2018-08-17
Payer: MEDICARE

## 2018-08-17 ENCOUNTER — HOSPITAL ENCOUNTER (EMERGENCY)
Age: 83
Discharge: HOME | End: 2018-08-17
Attending: EMERGENCY MEDICINE
Payer: MEDICARE

## 2018-08-17 VITALS
OXYGEN SATURATION: 100 % | HEART RATE: 62 BPM | SYSTOLIC BLOOD PRESSURE: 157 MMHG | DIASTOLIC BLOOD PRESSURE: 73 MMHG | RESPIRATION RATE: 19 BRPM | TEMPERATURE: 98.5 F

## 2018-08-17 DIAGNOSIS — L03.115 CELLULITIS OF RIGHT LOWER EXTREMITY: Primary | ICD-10-CM

## 2018-08-17 LAB
C-REACTIVE PROTEIN: 20.1 MG/L (ref 0–5)
HCT VFR BLD CALC: 39.4 % (ref 40.7–50.3)
HEMOGLOBIN: 13.4 G/DL (ref 13–17)
MCH RBC QN AUTO: 30.5 PG (ref 25.2–33.5)
MCHC RBC AUTO-ENTMCNC: 34 G/DL (ref 28.4–34.8)
MCV RBC AUTO: 89.5 FL (ref 82.6–102.9)
NRBC AUTOMATED: 0 PER 100 WBC
PDW BLD-RTO: 14 % (ref 11.8–14.4)
PLATELET # BLD: 147 K/UL (ref 138–453)
PMV BLD AUTO: 8.8 FL (ref 8.1–13.5)
RBC # BLD: 4.4 M/UL (ref 4.21–5.77)
SEDIMENTATION RATE, ERYTHROCYTE: 37 MM (ref 0–20)
URIC ACID: 4.9 MG/DL (ref 3.4–7)
WBC # BLD: 5.2 K/UL (ref 3.5–11.3)

## 2018-08-17 PROCEDURE — 73610 X-RAY EXAM OF ANKLE: CPT

## 2018-08-17 PROCEDURE — 6370000000 HC RX 637 (ALT 250 FOR IP): Performed by: EMERGENCY MEDICINE

## 2018-08-17 PROCEDURE — 2580000003 HC RX 258: Performed by: EMERGENCY MEDICINE

## 2018-08-17 PROCEDURE — 96365 THER/PROPH/DIAG IV INF INIT: CPT

## 2018-08-17 PROCEDURE — 85651 RBC SED RATE NONAUTOMATED: CPT

## 2018-08-17 PROCEDURE — 84550 ASSAY OF BLOOD/URIC ACID: CPT

## 2018-08-17 PROCEDURE — 6360000002 HC RX W HCPCS: Performed by: EMERGENCY MEDICINE

## 2018-08-17 PROCEDURE — 85027 COMPLETE CBC AUTOMATED: CPT

## 2018-08-17 PROCEDURE — 86140 C-REACTIVE PROTEIN: CPT

## 2018-08-17 PROCEDURE — 99283 EMERGENCY DEPT VISIT LOW MDM: CPT

## 2018-08-17 RX ORDER — HYDROCODONE BITARTRATE AND ACETAMINOPHEN 5; 325 MG/1; MG/1
1 TABLET ORAL ONCE
Status: COMPLETED | OUTPATIENT
Start: 2018-08-17 | End: 2018-08-17

## 2018-08-17 RX ORDER — DOXYCYCLINE 100 MG/1
100 TABLET ORAL 2 TIMES DAILY
Qty: 20 TABLET | Refills: 0 | Status: SHIPPED | OUTPATIENT
Start: 2018-08-17 | End: 2018-08-27

## 2018-08-17 RX ADMIN — HYDROCODONE BITARTRATE AND ACETAMINOPHEN 1 TABLET: 5; 325 TABLET ORAL at 10:15

## 2018-08-17 RX ADMIN — VANCOMYCIN HYDROCHLORIDE 1000 MG: 1 INJECTION, POWDER, LYOPHILIZED, FOR SOLUTION INTRAVENOUS at 10:15

## 2018-08-17 ASSESSMENT — PAIN SCALES - GENERAL
PAINLEVEL_OUTOF10: 10
PAINLEVEL_OUTOF10: 5
PAINLEVEL_OUTOF10: 10

## 2018-08-17 ASSESSMENT — PAIN DESCRIPTION - ORIENTATION: ORIENTATION: RIGHT

## 2018-08-17 ASSESSMENT — PAIN DESCRIPTION - PAIN TYPE: TYPE: ACUTE PAIN

## 2018-08-17 ASSESSMENT — ENCOUNTER SYMPTOMS
RESPIRATORY NEGATIVE: 1
COLOR CHANGE: 1

## 2018-08-17 ASSESSMENT — PAIN DESCRIPTION - LOCATION: LOCATION: ANKLE

## 2018-08-17 NOTE — ED PROVIDER NOTES
[facility] ED  eMERGENCY dEPARTMENT eNCOUnter      Pt Name: Daija Smith  MRN: [id number]  [place] 7/8/1932  Date of evaluation: 8/17/2018  Provider: Bola Cruz MD    CHIEF COMPLAINT       Chief Complaint   Patient presents with    Joint Swelling     right ankle, ongoing 3 weeks, painful         HISTORY OF PRESENT ILLNESS   (Location/Symptom, Timing/Onset, Context/Setting, Quality, Duration, Modifying Factors, Severity)  Note limiting factors. Daija Smith is a 80 y.o. male who presents to the emergency department      HPI  81 yo male with right ankle swelling that began 4 days ago. No h/o gout. No h/o DM. No fever or chills. No known h/o arthiritis. No trauma or open wound to leg, foot or ankle. Nursing Notes were reviewed. REVIEW OF SYSTEMS    (2-9 systems for level 4, 10 or more for level 5)     Review of Systems   Constitutional: Negative. Respiratory: Negative. Cardiovascular: Negative. Genitourinary: Negative. Musculoskeletal: Positive for arthralgias, gait problem and joint swelling. Skin: Positive for color change and rash. Hematological: Negative. Except as noted above the remainder of the review of systems was reviewed and negative.        PAST MEDICAL HISTORY     Past Medical History:   Diagnosis Date    Cataract     GERD (gastroesophageal reflux disease)     Hearing loss     Hyperlipidemia     Osteoarthritis     Stroke ([address] 2017    Ulcer     in past     Uses hearing aid          SURGICAL HISTORY       Past Surgical History:   Procedure Laterality Date    ARM SURGERY      left arm rebuild     EYE SURGERY      , cataract    SHOULDER SURGERY      right         CURRENT MEDICATIONS       Previous Medications    ASPIRIN 81 MG CHEWABLE TABLET    Take 1 tablet by mouth daily    ATORVASTATIN (LIPITOR) 40 MG TABLET    Take 1 tablet by mouth nightly    CALCIUM CARBONATE (OSCAL) 500 MG TABS TABLET    Take 500 mg by mouth daily    CLOPIDOGREL dorsal flexion. No open wounds. Swelling on lateral mal with less warmth. Neurological: He is alert and oriented to person, place, and time. Skin: Skin is warm and dry. DIAGNOSTIC RESULTS     EKG: All EKG's are interpreted by the Emergency Department Physician who either signs or Co-signs this chart in the absence of a cardiologist.      RADIOLOGY:   Non-plain film images such as CT, Ultrasound and MRI are read by the radiologist. Plain radiographic images are visualized and preliminarily interpreted by the emergency physician with the below findings:      Interpretation per the Radiologist below, if available at the time of this note:    XR ANKLE RIGHT (MIN 3 VIEWS)   Final Result   VERY MILD SOFT TISSUE SWELLING            ED BEDSIDE ULTRASOUND:   Performed by ED Physician - none    LABS:  Labs Reviewed   CBC - Abnormal; Notable for the following:        Result Value    Hematocrit 39.4 (*)     All other components within normal limits   SEDIMENTATION RATE - Abnormal; Notable for the following:     Sed Rate 37 (*)     All other components within normal limits   C-REACTIVE PROTEIN - Abnormal; Notable for the following:     CRP 20.1 (*)     All other components within normal limits   URIC ACID       All other labs were within normal range or not returned as of this dictation. EMERGENCY DEPARTMENT COURSE and DIFFERENTIAL DIAGNOSIS/MDM:   Vitals:    Vitals:    08/17/18 0847 08/17/18 1123 08/17/18 1134   BP: (!) 151/80 121/84    Pulse: 82     Resp: 19     Temp: 98.5 °F (36.9 °C)     SpO2: 97% 97% 100%         MDM          Procedures    FINAL IMPRESSION      1. Cellulitis of right lower extremity          DISPOSITION/PLAN   DISPOSITION Decision To Discharge 08/17/2018 12:55:09 PM      PATIENT REFERRED TO:  No follow-up provider specified.     DISCHARGE MEDICATIONS:  New Prescriptions    No medications on file              Summation      Patient Course:      ED Medications administered this visit:

## 2018-10-24 ENCOUNTER — HOSPITAL ENCOUNTER (EMERGENCY)
Age: 83
Discharge: HOME | End: 2018-10-24
Attending: EMERGENCY MEDICINE
Payer: MEDICARE

## 2018-10-24 ENCOUNTER — APPOINTMENT (OUTPATIENT)
Dept: GENERAL RADIOLOGY | Age: 83
End: 2018-10-24
Payer: MEDICARE

## 2018-10-24 VITALS
TEMPERATURE: 97.6 F | HEART RATE: 76 BPM | RESPIRATION RATE: 20 BRPM | SYSTOLIC BLOOD PRESSURE: 146 MMHG | OXYGEN SATURATION: 97 % | DIASTOLIC BLOOD PRESSURE: 92 MMHG

## 2018-10-24 DIAGNOSIS — M25.561 ACUTE PAIN OF RIGHT KNEE: Primary | ICD-10-CM

## 2018-10-24 DIAGNOSIS — M17.11 OSTEOARTHRITIS OF RIGHT KNEE, UNSPECIFIED OSTEOARTHRITIS TYPE: ICD-10-CM

## 2018-10-24 PROCEDURE — 99283 EMERGENCY DEPT VISIT LOW MDM: CPT

## 2018-10-24 PROCEDURE — 73560 X-RAY EXAM OF KNEE 1 OR 2: CPT

## 2018-10-24 RX ORDER — COVID-19 ANTIGEN TEST
1 KIT MISCELLANEOUS 2 TIMES DAILY
Qty: 14 CAPSULE | Refills: 0 | Status: SHIPPED | OUTPATIENT
Start: 2018-10-24 | End: 2021-07-01 | Stop reason: ALTCHOICE

## 2018-10-24 RX ORDER — HYDROCODONE BITARTRATE AND ACETAMINOPHEN 5; 325 MG/1; MG/1
1 TABLET ORAL EVERY 6 HOURS PRN
Qty: 12 TABLET | Refills: 0 | Status: SHIPPED | OUTPATIENT
Start: 2018-10-24 | End: 2018-10-29

## 2018-10-24 ASSESSMENT — PAIN DESCRIPTION - ORIENTATION: ORIENTATION: RIGHT

## 2018-10-24 ASSESSMENT — PAIN DESCRIPTION - PAIN TYPE: TYPE: ACUTE PAIN

## 2018-10-24 ASSESSMENT — PAIN DESCRIPTION - LOCATION: LOCATION: KNEE

## 2018-10-24 ASSESSMENT — PAIN SCALES - GENERAL: PAINLEVEL_OUTOF10: 10

## 2018-10-24 NOTE — ED PROVIDER NOTES
CARBONATE (OSCAL) 500 MG TABS TABLET    Take 500 mg by mouth daily    CLOPIDOGREL (PLAVIX) 75 MG TABLET    Take 1 tablet by mouth daily    NONFORMULARY    Take 1 tablet by mouth daily neutriview. OMEGA-3 FATTY ACIDS (FISH OIL) 1000 MG CAPS    Take 1,000 mg by mouth daily       ALLERGIES     Patient has no known allergies. FAMILY HISTORY       Family History   Problem Relation Age of Onset    Cancer Mother         bladder    Heart Disease Father     High Blood Pressure Brother     Stroke Brother     High Blood Pressure Brother     Stroke Brother         SOCIAL HISTORY       Social History     Social History    Marital status: Single     Spouse name: N/A    Number of children: N/A    Years of education: N/A     Social History Main Topics    Smoking status: Former Smoker    Smokeless tobacco: Never Used    Alcohol use 2.4 oz/week     4 Cans of beer per week    Drug use: No    Sexual activity: Not Asked     Other Topics Concern    None     Social History Narrative    None       SCREENINGS           PHYSICAL EXAM    (up to 7 for level 4, 8 or more for level 5)     ED Triage Vitals   BP Temp Temp Source Pulse Resp SpO2 Height Weight   10/24/18 0921 10/24/18 0916 10/24/18 0916 10/24/18 0916 -- 10/24/18 0916 -- --   (!) 146/92 97.6 °F (36.4 °C) Tympanic 76  97 %       ED Triage Vitals   Enc Vitals Group      BP 10/24/18 0921 (!) 146/92      Pulse 10/24/18 0916 76      Resp --       Temp 10/24/18 0916 97.6 °F (36.4 °C)      Temp Source 10/24/18 0916 Tympanic      SpO2 10/24/18 0916 97 %      Weight --       Height --       Head Circumference --       Peak Flow --       Pain Score --       Pain Loc --       Pain Edu? --       Excl. in [address]? --      Physical Exam    GENERAL APPEARANCE: Awake and alert. Cooperative. No acute distress. EXTREMITIES: No acute deformities. Mild swelling of his right knee. Full range of motion. Popliteal pulses are strong. No neurovascular compromise.   Slight knee [id number]    DO Gifty Pederson (electronically signed)  Emergency Medicine Provider          Sarah Rivera,   10/24/18 Pola Zhu, DO  10/24/18 1046

## 2019-10-20 ENCOUNTER — HOSPITAL ENCOUNTER (EMERGENCY)
Age: 84
Discharge: HOME | End: 2019-10-20
Attending: EMERGENCY MEDICINE
Payer: MEDICARE

## 2019-10-20 ENCOUNTER — APPOINTMENT (OUTPATIENT)
Dept: GENERAL RADIOLOGY | Age: 84
End: 2019-10-20
Payer: MEDICARE

## 2019-10-20 VITALS
HEART RATE: 72 BPM | BODY MASS INDEX: 23.14 KG/M2 | SYSTOLIC BLOOD PRESSURE: 132 MMHG | TEMPERATURE: 96.3 F | DIASTOLIC BLOOD PRESSURE: 78 MMHG | OXYGEN SATURATION: 98 % | WEIGHT: 190 LBS | HEIGHT: 76 IN | RESPIRATION RATE: 16 BRPM

## 2019-10-20 DIAGNOSIS — M25.551 RIGHT HIP PAIN: ICD-10-CM

## 2019-10-20 DIAGNOSIS — M25.552 LEFT HIP PAIN: Primary | ICD-10-CM

## 2019-10-20 PROCEDURE — 99283 EMERGENCY DEPT VISIT LOW MDM: CPT

## 2019-10-20 PROCEDURE — 73502 X-RAY EXAM HIP UNI 2-3 VIEWS: CPT

## 2019-10-20 ASSESSMENT — PAIN DESCRIPTION - PAIN TYPE: TYPE: ACUTE PAIN

## 2019-10-20 ASSESSMENT — PAIN DESCRIPTION - DESCRIPTORS: DESCRIPTORS: ACHING

## 2019-10-20 ASSESSMENT — PAIN SCALES - GENERAL
PAINLEVEL_OUTOF10: 0
PAINLEVEL_OUTOF10: 10

## 2019-10-20 ASSESSMENT — PAIN DESCRIPTION - LOCATION: LOCATION: HIP

## 2019-10-20 ASSESSMENT — PAIN DESCRIPTION - ORIENTATION: ORIENTATION: RIGHT;LEFT

## 2020-08-21 ENCOUNTER — HOSPITAL ENCOUNTER (OUTPATIENT)
Dept: GENERAL RADIOLOGY | Age: 85
Discharge: HOME | End: 2020-08-23
Payer: MEDICARE

## 2020-08-21 ENCOUNTER — HOSPITAL ENCOUNTER (OUTPATIENT)
Age: 85
Discharge: HOME | End: 2020-08-23
Payer: MEDICARE

## 2020-08-21 PROCEDURE — 72050 X-RAY EXAM NECK SPINE 4/5VWS: CPT

## 2021-05-07 ENCOUNTER — APPOINTMENT (OUTPATIENT)
Dept: CT IMAGING | Age: 86
End: 2021-05-07
Payer: MEDICARE

## 2021-05-07 ENCOUNTER — HOSPITAL ENCOUNTER (EMERGENCY)
Age: 86
Discharge: ANOTHER HOSPITAL | End: 2021-05-08
Payer: MEDICARE

## 2021-05-07 ENCOUNTER — APPOINTMENT (OUTPATIENT)
Dept: GENERAL RADIOLOGY | Age: 86
End: 2021-05-07
Payer: MEDICARE

## 2021-05-07 VITALS
BODY MASS INDEX: 20.69 KG/M2 | RESPIRATION RATE: 16 BRPM | SYSTOLIC BLOOD PRESSURE: 145 MMHG | DIASTOLIC BLOOD PRESSURE: 102 MMHG | TEMPERATURE: 98.9 F | HEART RATE: 88 BPM | WEIGHT: 170 LBS | OXYGEN SATURATION: 98 %

## 2021-05-07 DIAGNOSIS — R33.9 URINARY RETENTION: ICD-10-CM

## 2021-05-07 DIAGNOSIS — K56.609 SMALL BOWEL OBSTRUCTION (HCC): Primary | ICD-10-CM

## 2021-05-07 DIAGNOSIS — N17.9 ACUTE RENAL FAILURE, UNSPECIFIED ACUTE RENAL FAILURE TYPE (HCC): ICD-10-CM

## 2021-05-07 LAB
-: ABNORMAL
ABSOLUTE EOS #: 0.04 K/UL (ref 0–0.44)
ABSOLUTE IMMATURE GRANULOCYTE: 0.08 K/UL (ref 0–0.3)
ABSOLUTE LYMPH #: 0.91 K/UL (ref 1.1–3.7)
ABSOLUTE MONO #: 0.93 K/UL (ref 0.1–1.2)
ALBUMIN SERPL-MCNC: 3.9 G/DL (ref 3.5–5.2)
ALBUMIN/GLOBULIN RATIO: 1.3 (ref 1–2.5)
ALP BLD-CCNC: 100 U/L (ref 40–129)
ALT SERPL-CCNC: 24 U/L (ref 5–41)
AMORPHOUS: ABNORMAL
AMYLASE: 42 U/L (ref 28–100)
ANION GAP SERPL CALCULATED.3IONS-SCNC: 14 MMOL/L (ref 9–17)
AST SERPL-CCNC: 42 U/L
BACTERIA: ABNORMAL
BASOPHILS # BLD: 0 % (ref 0–2)
BASOPHILS ABSOLUTE: <0.03 K/UL (ref 0–0.2)
BILIRUB SERPL-MCNC: 1.14 MG/DL (ref 0.3–1.2)
BILIRUBIN URINE: NEGATIVE
BUN BLDV-MCNC: 54 MG/DL (ref 8–23)
BUN/CREAT BLD: 10 (ref 9–20)
CALCIUM SERPL-MCNC: 9.2 MG/DL (ref 8.6–10.4)
CASTS UA: ABNORMAL /LPF
CHLORIDE BLD-SCNC: 100 MMOL/L (ref 98–107)
CO2: 22 MMOL/L (ref 20–31)
COLOR: ABNORMAL
COMMENT UA: ABNORMAL
CREAT SERPL-MCNC: 5.68 MG/DL (ref 0.7–1.2)
CRYSTALS, UA: ABNORMAL /HPF
DIFFERENTIAL TYPE: ABNORMAL
EOSINOPHILS RELATIVE PERCENT: 0 % (ref 1–4)
EPITHELIAL CELLS UA: ABNORMAL /HPF (ref 0–5)
GFR AFRICAN AMERICAN: 12 ML/MIN
GFR NON-AFRICAN AMERICAN: 10 ML/MIN
GFR SERPL CREATININE-BSD FRML MDRD: ABNORMAL ML/MIN/{1.73_M2}
GFR SERPL CREATININE-BSD FRML MDRD: ABNORMAL ML/MIN/{1.73_M2}
GLUCOSE BLD-MCNC: 136 MG/DL (ref 70–99)
GLUCOSE URINE: NEGATIVE
HCT VFR BLD CALC: 37.6 % (ref 40.7–50.3)
HEMOGLOBIN: 13.3 G/DL (ref 13–17)
IMMATURE GRANULOCYTES: 1 %
KETONES, URINE: NEGATIVE
LEUKOCYTE ESTERASE, URINE: ABNORMAL
LIPASE: 11 U/L (ref 13–60)
LYMPHOCYTES # BLD: 7 % (ref 24–43)
MCH RBC QN AUTO: 31.3 PG (ref 25.2–33.5)
MCHC RBC AUTO-ENTMCNC: 35.4 G/DL (ref 28.4–34.8)
MCV RBC AUTO: 88.5 FL (ref 82.6–102.9)
MONOCYTES # BLD: 7 % (ref 3–12)
MUCUS: ABNORMAL
MYOGLOBIN: 453 NG/ML (ref 28–72)
NITRITE, URINE: NEGATIVE
NRBC AUTOMATED: 0 PER 100 WBC
OTHER OBSERVATIONS UA: ABNORMAL
PDW BLD-RTO: 13.7 % (ref 11.8–14.4)
PH UA: 5.5 (ref 5–9)
PLATELET # BLD: 155 K/UL (ref 138–453)
PLATELET ESTIMATE: ABNORMAL
PMV BLD AUTO: 9.2 FL (ref 8.1–13.5)
POTASSIUM SERPL-SCNC: 4.5 MMOL/L (ref 3.7–5.3)
PROTEIN UA: ABNORMAL
RBC # BLD: 4.25 M/UL (ref 4.21–5.77)
RBC # BLD: ABNORMAL 10*6/UL
RBC UA: ABNORMAL /HPF (ref 0–2)
RENAL EPITHELIAL, UA: ABNORMAL /HPF
SARS-COV-2, RAPID: NOT DETECTED
SEG NEUTROPHILS: 85 % (ref 36–65)
SEGMENTED NEUTROPHILS ABSOLUTE COUNT: 10.72 K/UL (ref 1.5–8.1)
SODIUM BLD-SCNC: 136 MMOL/L (ref 135–144)
SPECIFIC GRAVITY UA: 1.02 (ref 1.01–1.02)
SPECIMEN DESCRIPTION: NORMAL
TOTAL PROTEIN: 7 G/DL (ref 6.4–8.3)
TRICHOMONAS: ABNORMAL
TURBIDITY: ABNORMAL
URINE HGB: ABNORMAL
UROBILINOGEN, URINE: NORMAL
WBC # BLD: 12.7 K/UL (ref 3.5–11.3)
WBC # BLD: ABNORMAL 10*3/UL
WBC UA: ABNORMAL /HPF (ref 0–5)
YEAST: ABNORMAL

## 2021-05-07 PROCEDURE — 74176 CT ABD & PELVIS W/O CONTRAST: CPT

## 2021-05-07 PROCEDURE — 51798 US URINE CAPACITY MEASURE: CPT

## 2021-05-07 PROCEDURE — 36415 COLL VENOUS BLD VENIPUNCTURE: CPT

## 2021-05-07 PROCEDURE — 99284 EMERGENCY DEPT VISIT MOD MDM: CPT

## 2021-05-07 PROCEDURE — 2580000003 HC RX 258: Performed by: PHYSICIAN ASSISTANT

## 2021-05-07 PROCEDURE — 83690 ASSAY OF LIPASE: CPT

## 2021-05-07 PROCEDURE — 85025 COMPLETE CBC W/AUTO DIFF WBC: CPT

## 2021-05-07 PROCEDURE — 83605 ASSAY OF LACTIC ACID: CPT

## 2021-05-07 PROCEDURE — 81001 URINALYSIS AUTO W/SCOPE: CPT

## 2021-05-07 PROCEDURE — 80053 COMPREHEN METABOLIC PANEL: CPT

## 2021-05-07 PROCEDURE — 87635 SARS-COV-2 COVID-19 AMP PRB: CPT

## 2021-05-07 PROCEDURE — 83874 ASSAY OF MYOGLOBIN: CPT

## 2021-05-07 PROCEDURE — 82150 ASSAY OF AMYLASE: CPT

## 2021-05-07 PROCEDURE — C9803 HOPD COVID-19 SPEC COLLECT: HCPCS

## 2021-05-07 PROCEDURE — 74018 RADEX ABDOMEN 1 VIEW: CPT

## 2021-05-07 RX ORDER — 0.9 % SODIUM CHLORIDE 0.9 %
500 INTRAVENOUS SOLUTION INTRAVENOUS ONCE
Status: COMPLETED | OUTPATIENT
Start: 2021-05-07 | End: 2021-05-07

## 2021-05-07 RX ORDER — 0.9 % SODIUM CHLORIDE 0.9 %
1000 INTRAVENOUS SOLUTION INTRAVENOUS ONCE
Status: COMPLETED | OUTPATIENT
Start: 2021-05-07 | End: 2021-05-07

## 2021-05-07 RX ADMIN — SODIUM CHLORIDE 500 ML: 9 INJECTION, SOLUTION INTRAVENOUS at 22:06

## 2021-05-07 RX ADMIN — SODIUM CHLORIDE 1000 ML: 9 INJECTION, SOLUTION INTRAVENOUS at 18:38

## 2021-05-07 ASSESSMENT — PAIN DESCRIPTION - PAIN TYPE: TYPE: ACUTE PAIN

## 2021-05-07 ASSESSMENT — PAIN DESCRIPTION - FREQUENCY: FREQUENCY: CONTINUOUS

## 2021-05-07 ASSESSMENT — PAIN DESCRIPTION - ORIENTATION: ORIENTATION: LOWER

## 2021-05-07 ASSESSMENT — ENCOUNTER SYMPTOMS: ABDOMINAL PAIN: 1

## 2021-05-07 ASSESSMENT — PAIN DESCRIPTION - LOCATION: LOCATION: BACK

## 2021-05-08 NOTE — ED PROVIDER NOTES
[facility] ED  eMERGENCY dEPARTMENT eNCOUnter      Pt Name: Bull Hollis  MRN: [id number]  [place] 7/8/1932  Date of evaluation: 5/7/21      CHIEF COMPLAINT       Chief Complaint   Patient presents with    Back Pain     low back pain, onset x 2 days. PT states he has not urinated at all for 2 days    Emesis         HISTORY OF PRESENT ILLNESS    Bull Hollis is a 80 y.o. male who presents complaining of decreased appetite, decreased urine output over the last 2 days he has had no urine output he feels like he has to urinate but he is unable. He has had some abdominal bloating and discomfort over the past few days. The history is provided by the patient. Abdominal Pain  Pain location:  Generalized  Pain quality: aching, bloating and sharp    Pain radiates to:  Does not radiate  Pain severity:  Mild  Onset quality:  Gradual  Duration:  3 days  Timing:  Intermittent  Progression:  Waxing and waning  Chronicity:  New      REVIEW OF SYSTEMS       Review of Systems   Gastrointestinal: Positive for abdominal pain.        PAST MEDICAL HISTORY     Past Medical History:   Diagnosis Date    Cataract     GERD (gastroesophageal reflux disease)     Hearing loss     Hyperlipidemia     Osteoarthritis     Stroke ([facility] [address] 2017    Ulcer     in past     Uses hearing aid        SURGICAL HISTORY       Past Surgical History:   Procedure Laterality Date    ARM SURGERY      left arm rebuild     EYE SURGERY      , cataract    SHOULDER SURGERY      right       CURRENT MEDICATIONS       Discharge Medication List as of 5/8/2021  4:37 AM      CONTINUE these medications which have NOT CHANGED    Details   atorvastatin (LIPITOR) 40 MG tablet Take 1 tablet by mouth nightly, Disp-90 tablet, R-3Normal      clopidogrel (PLAVIX) 75 MG tablet Take 1 tablet by mouth daily, Disp-90 tablet, R-3Normal      aspirin 81 MG chewable tablet Take 1 tablet by mouth daily, Disp-30 tablet, R-3Normal      Omega-3 Fatty Acids (FISH OIL) 1000 MG COMPREHENSIVE METABOLIC PANEL W/ REFLEX TO MG FOR LOW K - Abnormal; Notable for the following components:       Result Value    Glucose 136 (*)     BUN 54 (*)     CREATININE 5.68 (*)     AST 42 (*)     GFR Non- 10 (*)     GFR  12 (*)     All other components within normal limits   LIPASE - Abnormal; Notable for the following components:    Lipase 11 (*)     All other components within normal limits   URINALYSIS - Abnormal; Notable for the following components:    Color, UA RED (*)     Turbidity UA CLOUDY (*)     Urine Hgb 3+ (*)     Protein, UA 1+ (*)     Leukocyte Esterase, Urine TRACE (*)     All other components within normal limits   MICROSCOPIC URINALYSIS - Abnormal; Notable for the following components:    Bacteria, UA 2+ (*)     All other components within normal limits   CBC WITH AUTO DIFFERENTIAL - Abnormal; Notable for the following components:    WBC 12.7 (*)     Hematocrit 37.6 (*)     MCHC 35.4 (*)     Seg Neutrophils 85 (*)     Lymphocytes 7 (*)     Eosinophils % 0 (*)     Immature Granulocytes 1 (*)     Segs Absolute 10.72 (*)     Absolute Lymph # 0.91 (*)     All other components within normal limits   MYOGLOBIN, SERUM - Abnormal; Notable for the following components:    Myoglobin 453 (*)     All other components within normal limits   COVID-19, RAPID   AMYLASE         EMERGENCY DEPARTMENT COURSE:   Vitals:    Vitals:    05/07/21 1753   BP: (!) 145/102   Pulse: 88   Resp: 16   Temp: 98.9 °F (37.2 °C)   TempSrc: Tympanic   SpO2: 98%   Weight: 170 lb (77.1 kg)       The patient was given the following medications while in the emergency department:  Orders Placed This Encounter   Medications    0.9 % sodium chloride bolus    0.9 % sodium chloride bolus       -------------------------      CRITICAL CARE:     CONSULTS:  None    PROCEDURES:  Procedures    FINAL IMPRESSION      1. Small bowel obstruction (HCC) Stable   2.  Acute renal failure, unspecified acute renal failure type ([address] Stable   3. Urinary retention Stable         DISPOSITION/PLAN   DISPOSITION Decision To Transfer 05/07/2021 08:31:45 PM      PATIENT REFERREDTO:  No follow-up provider specified.     DISCHARGEMEDICATIONS:  Discharge Medication List as of 5/8/2021  4:37 AM          (Please note that portions of this note were completed with a voice recognition program.  Efforts were made to edit thedictations but occasionally words are mis-transcribed.)    MATTHIEU Olmstead PA-C  05/11/21 1135

## 2021-05-08 NOTE — ED NOTES
[address] [place] paging [facility] hospitalist for transfer     Jenelle Blancas  05/07/21 2029

## 2021-05-08 NOTE — ED NOTES
Per Rafi Minor is discharge dependent, pt family requested [facility].      [employer]  05/07/21 2347

## 2021-05-12 ENCOUNTER — HOSPITAL ENCOUNTER (EMERGENCY)
Age: 86
Discharge: HOME | End: 2021-05-12
Attending: EMERGENCY MEDICINE
Payer: MEDICARE

## 2021-05-12 ENCOUNTER — APPOINTMENT (OUTPATIENT)
Dept: CT IMAGING | Age: 86
End: 2021-05-12
Payer: MEDICARE

## 2021-05-12 ENCOUNTER — APPOINTMENT (OUTPATIENT)
Dept: GENERAL RADIOLOGY | Age: 86
End: 2021-05-12
Payer: MEDICARE

## 2021-05-12 VITALS
HEART RATE: 74 BPM | OXYGEN SATURATION: 97 % | SYSTOLIC BLOOD PRESSURE: 140 MMHG | DIASTOLIC BLOOD PRESSURE: 55 MMHG | RESPIRATION RATE: 17 BRPM | HEIGHT: 77 IN | TEMPERATURE: 98.1 F | BODY MASS INDEX: 21.25 KG/M2 | WEIGHT: 180 LBS
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
## 2021-05-12 DIAGNOSIS — R53.1 GENERAL WEAKNESS: ICD-10-CM

## 2021-05-12 DIAGNOSIS — K56.609 SMALL BOWEL OBSTRUCTION (HCC): ICD-10-CM

## 2021-05-12 DIAGNOSIS — R62.7 FAILURE TO THRIVE IN ADULT: ICD-10-CM

## 2021-05-12 LAB
-: ABNORMAL
ABSOLUTE EOS #: 0.11 K/UL (ref 0–0.44)
ABSOLUTE IMMATURE GRANULOCYTE: 0.08 K/UL (ref 0–0.3)
ABSOLUTE LYMPH #: 1 K/UL (ref 1.1–3.7)
ABSOLUTE MONO #: 0.85 K/UL (ref 0.1–1.2)
ALBUMIN SERPL-MCNC: 3.2 G/DL (ref 3.5–5.2)
ALBUMIN/GLOBULIN RATIO: 1 (ref 1–2.5)
ALP BLD-CCNC: 91 U/L (ref 40–129)
ALT SERPL-CCNC: 53 U/L (ref 5–41)
AMORPHOUS: ABNORMAL
ANION GAP SERPL CALCULATED.3IONS-SCNC: 8 MMOL/L (ref 9–17)
AST SERPL-CCNC: 43 U/L
BACTERIA: ABNORMAL
BASOPHILS # BLD: 0 % (ref 0–2)
BASOPHILS ABSOLUTE: 0.03 K/UL (ref 0–0.2)
BILIRUB SERPL-MCNC: 0.86 MG/DL (ref 0.3–1.2)
BILIRUBIN URINE: NEGATIVE
BUN BLDV-MCNC: 17 MG/DL (ref 8–23)
BUN/CREAT BLD: 16 (ref 9–20)
CALCIUM SERPL-MCNC: 8.7 MG/DL (ref 8.6–10.4)
CASTS UA: ABNORMAL /LPF
CHLORIDE BLD-SCNC: 101 MMOL/L (ref 98–107)
CO2: 26 MMOL/L (ref 20–31)
COLOR: YELLOW
COMMENT UA: ABNORMAL
CREAT SERPL-MCNC: 1.05 MG/DL (ref 0.7–1.2)
CRYSTALS, UA: ABNORMAL /HPF
DIFFERENTIAL TYPE: ABNORMAL
EOSINOPHILS RELATIVE PERCENT: 1 % (ref 1–4)
EPITHELIAL CELLS UA: ABNORMAL /HPF (ref 0–5)
GFR AFRICAN AMERICAN: >60 ML/MIN
GFR NON-AFRICAN AMERICAN: >60 ML/MIN
GFR SERPL CREATININE-BSD FRML MDRD: ABNORMAL ML/MIN/{1.73_M2}
GFR SERPL CREATININE-BSD FRML MDRD: ABNORMAL ML/MIN/{1.73_M2}
GLUCOSE BLD-MCNC: 113 MG/DL (ref 70–99)
GLUCOSE URINE: NEGATIVE
HCT VFR BLD CALC: 35.4 % (ref 40.7–50.3)
HEMOGLOBIN: 11.9 G/DL (ref 13–17)
IMMATURE GRANULOCYTES: 1 %
KETONES, URINE: NEGATIVE
LEUKOCYTE ESTERASE, URINE: ABNORMAL
LYMPHOCYTES # BLD: 13 % (ref 24–43)
MCH RBC QN AUTO: 30.7 PG (ref 25.2–33.5)
MCHC RBC AUTO-ENTMCNC: 33.6 G/DL (ref 28.4–34.8)
MCV RBC AUTO: 91.2 FL (ref 82.6–102.9)
MONOCYTES # BLD: 11 % (ref 3–12)
MUCUS: ABNORMAL
NITRITE, URINE: NEGATIVE
NRBC AUTOMATED: 0 PER 100 WBC
OTHER OBSERVATIONS UA: ABNORMAL
PDW BLD-RTO: 13.2 % (ref 11.8–14.4)
PH UA: 6 (ref 5–9)
PLATELET # BLD: 168 K/UL (ref 138–453)
PLATELET ESTIMATE: ABNORMAL
PMV BLD AUTO: 8.8 FL (ref 8.1–13.5)
POTASSIUM SERPL-SCNC: 3.9 MMOL/L (ref 3.7–5.3)
PROTEIN UA: ABNORMAL
RBC # BLD: 3.88 M/UL (ref 4.21–5.77)
RBC # BLD: ABNORMAL 10*6/UL
RBC UA: ABNORMAL /HPF (ref 0–2)
RENAL EPITHELIAL, UA: ABNORMAL /HPF
SEG NEUTROPHILS: 73 % (ref 36–65)
SEGMENTED NEUTROPHILS ABSOLUTE COUNT: 5.59 K/UL (ref 1.5–8.1)
SODIUM BLD-SCNC: 135 MMOL/L (ref 135–144)
SPECIFIC GRAVITY UA: 1.02 (ref 1.01–1.02)
TOTAL PROTEIN: 6.4 G/DL (ref 6.4–8.3)
TRICHOMONAS: ABNORMAL
TURBIDITY: CLEAR
URINE HGB: ABNORMAL
UROBILINOGEN, URINE: ABNORMAL
WBC # BLD: 7.7 K/UL (ref 3.5–11.3)
WBC # BLD: ABNORMAL 10*3/UL
WBC UA: ABNORMAL /HPF (ref 0–5)
YEAST: ABNORMAL

## 2021-05-12 PROCEDURE — 2580000003 HC RX 258: Performed by: PHYSICIAN ASSISTANT

## 2021-05-12 PROCEDURE — 93005 ELECTROCARDIOGRAM TRACING: CPT | Performed by: PHYSICIAN ASSISTANT

## 2021-05-12 PROCEDURE — 99285 EMERGENCY DEPT VISIT HI MDM: CPT

## 2021-05-12 PROCEDURE — 36415 COLL VENOUS BLD VENIPUNCTURE: CPT

## 2021-05-12 PROCEDURE — 74018 RADEX ABDOMEN 1 VIEW: CPT

## 2021-05-12 PROCEDURE — 70450 CT HEAD/BRAIN W/O DYE: CPT

## 2021-05-12 PROCEDURE — 72125 CT NECK SPINE W/O DYE: CPT

## 2021-05-12 PROCEDURE — 81001 URINALYSIS AUTO W/SCOPE: CPT

## 2021-05-12 PROCEDURE — 80053 COMPREHEN METABOLIC PANEL: CPT

## 2021-05-12 PROCEDURE — 85025 COMPLETE CBC W/AUTO DIFF WBC: CPT

## 2021-05-12 RX ORDER — SODIUM CHLORIDE 9 MG/ML
1000 INJECTION, SOLUTION INTRAVENOUS CONTINUOUS
Status: DISCONTINUED | OUTPATIENT
Start: 2021-05-12 | End: 2021-05-12 | Stop reason: HOSPADM

## 2021-05-12 RX ORDER — POLYETHYLENE GLYCOL 3350 17 G/17G
17 POWDER, FOR SOLUTION ORAL DAILY
Qty: 1530 G | Refills: 1 | Status: SHIPPED | OUTPATIENT
Start: 2021-05-12 | End: 2021-06-02 | Stop reason: SDUPTHER

## 2021-05-12 RX ADMIN — SODIUM CHLORIDE 1000 ML: 9 INJECTION, SOLUTION INTRAVENOUS at 16:29

## 2021-05-12 ASSESSMENT — ENCOUNTER SYMPTOMS
ABDOMINAL DISTENTION: 0
DIARRHEA: 0
HEMATOCHEZIA: 0
COUGH: 0
VISUAL CHANGE: 0
NAUSEA: 0
ABDOMINAL PAIN: 0
VOMITING: 0
SHORTNESS OF BREATH: 0

## 2021-05-12 ASSESSMENT — PAIN SCALES - GENERAL
PAINLEVEL_OUTOF10: 6
PAINLEVEL_OUTOF10: 6

## 2021-05-12 ASSESSMENT — PAIN DESCRIPTION - PAIN TYPE: TYPE: ACUTE PAIN

## 2021-05-12 ASSESSMENT — PAIN DESCRIPTION - LOCATION: LOCATION: LEG

## 2021-05-12 ASSESSMENT — PAIN - FUNCTIONAL ASSESSMENT: PAIN_FUNCTIONAL_ASSESSMENT: 0-10

## 2021-05-12 NOTE — ED NOTES
Pt visitor notified Joanne Marshall RN that pt has a bed at [facility] ready and available. Writer spoke with visitor that [facility] has been working on placement for pt and was notified PTA that he has a bed ready at rehab center. Jarod Davis notified and writer called [facility] to verify. Spoke with Mel Zamora whom confirmed he does have a bed ready.  Report was given      Nolan Romero RN  05/12/21 9663

## 2021-05-12 NOTE — ED NOTES
Contacted Dr. Main Pittman, surgeon on call per Romie Gomez request.     Garett Estes Reser  05/12/21 0922

## 2021-05-12 NOTE — ED PROVIDER NOTES
[facility] ED  eMERGENCY dEPARTMENT eNCOUnter      Pt Name: Sofia Brasher  MRN: [id number]  [place] 7/8/1932  Date of evaluation: 5/12/21      CHIEF COMPLAINT       Chief Complaint   Patient presents with    Extremity Weakness     pt c/o generalized weakness and body pain x1 week. pt lives at home alone and having difficulty taking care of himself    Cassandrafelicitas [place] Fall     pt states he fell yesterday in the BR d/t losing his balance and hit head on stool. denies LOC or blood thinners         HISTORY OF PRESENT ILLNESS    Sofia Brasher is a 80 y.o. male who presents complaining of genralized weakness   The history is provided by the patient. Fatigue  Severity:  Moderate (Generalized weakness on able to care for himself at home. He was just discharged from the hospital for acute renal failure secondary to urinary retention. At that time he also had a possible bowel obstruction.)  Onset quality:  Gradual  Duration:  1 week  Timing:  Constant  Progression:  Worsening  Chronicity:  New  Context: recent infection    Relieved by:  Nothing  Worsened by:  Nothing  Ineffective treatments:  None tried  Associated symptoms: difficulty walking, falls and stroke symptoms    Associated symptoms: no abdominal pain, no anorexia, no chest pain, no cough, no diarrhea, no dizziness, no drooling, no dysphagia, no numbness in extremities, no fever, no headaches, no hematochezia, no lethargy, no loss of consciousness, no melena, no nausea, no seizures, no sensory-motor deficit, no shortness of breath, no vision change and no vomiting    Associated symptoms comment:  He has ongoing right-sided weakness. Recently admitted to [facility] for acute renal failure secondary to urinary retention. He also had a small bowel obstruction at that time where they placed an NG tube and gave him suppository for constipation. At this time is not complaining of any abdominal pain nausea or vomiting he is passing gas.   He is here today as he cannot take care of himself he fell yesterday in the bathroom and hit his head did not lose consciousness he denies any neck pain. REVIEW OF SYSTEMS       Review of Systems   Constitutional: Positive for fatigue. Negative for fever. HENT: Negative for drooling. Respiratory: Negative for cough and shortness of breath. Cardiovascular: Negative for chest pain. Gastrointestinal: Negative for abdominal distention, abdominal pain, anorexia, diarrhea, dysphagia, hematochezia, melena, nausea and vomiting. Genitourinary:        Has urinary retention air Kapoor catheter is in place at this time   Musculoskeletal: Positive for falls. Neurological: Negative for dizziness, seizures, loss of consciousness and headaches. All other systems reviewed and are negative. PAST MEDICAL HISTORY     Past Medical History:   Diagnosis Date    Cataract     GERD (gastroesophageal reflux disease)     Hearing loss     Hyperlipidemia     Osteoarthritis     Stroke ([facility] [address] 2017    Ulcer     in past     Uses hearing aid        SURGICAL HISTORY       Past Surgical History:   Procedure Laterality Date    ARM SURGERY      left arm rebuild     EYE SURGERY      , cataract    SHOULDER SURGERY      right       CURRENT MEDICATIONS       Previous Medications    ASPIRIN 81 MG CHEWABLE TABLET    Take 1 tablet by mouth daily    ATORVASTATIN (LIPITOR) 40 MG TABLET    Take 1 tablet by mouth nightly    CALCIUM CARBONATE (OSCAL) 500 MG TABS TABLET    Take 500 mg by mouth daily    CLOPIDOGREL (PLAVIX) 75 MG TABLET    Take 1 tablet by mouth daily    NAPROXEN SODIUM (ALEVE) 220 MG CAPS    Take 1 tablet by mouth 2 times daily for 7 days    NONFORMULARY    Take 1 tablet by mouth daily neutriview. OMEGA-3 FATTY ACIDS (FISH OIL) 1000 MG CAPS    Take 1,000 mg by mouth daily       ALLERGIES     has No Known Allergies. FAMILY HISTORY     He indicated that his mother is . He indicated that his father is .  He indicated that both of his brothers are alive. SOCIAL HISTORY      reports that he has quit smoking. He has never used smokeless tobacco. He reports current alcohol use of about 4.0 standard drinks of alcohol per week. He reports that he does not use drugs. PHYSICAL EXAM     INITIAL VITALS: BP (!) 140/55   Pulse 74   Temp 98.1 °F (36.7 °C) (Oral)   Resp 17   Ht 6' 5\" (1.956 m)   Wt 180 lb (81.6 kg)   SpO2 97%   BMI 21.34 kg/m²      Physical Exam  Vitals signs and nursing note reviewed. Constitutional:       Appearance: He is well-developed. HENT:      Head: Normocephalic and atraumatic. Cardiovascular:      Rate and Rhythm: Normal rate and regular rhythm. Heart sounds: Normal heart sounds. Pulmonary:      Effort: Pulmonary effort is normal.      Breath sounds: Normal breath sounds. Abdominal:      General: Abdomen is flat. Bowel sounds are normal. There is no distension. Palpations: Abdomen is soft. There is no mass. Tenderness: There is no abdominal tenderness. There is no guarding or rebound. Hernia: No hernia is present. Genitourinary:     Comments: Kapoor catheter in place is draining slightly cloudy yellow urine  Musculoskeletal: Normal range of motion. Skin:     General: Skin is warm. Capillary Refill: Capillary refill takes less than 2 seconds. Neurological:      General: No focal deficit present. Mental Status: He is alert and oriented to person, place, and time. Mental status is at baseline. MEDICAL DECISION MAKING:     Patient with generalized weakness unable to care for himself at home. He has recently discharged from [facility] where he is admitted for acute renal failure, bowel obstruction and urinary retention. He was discharged to home a couple of days ago he is unable to walk without severe difficulty and he fell yesterday.   While in the emergency department we did check basic labs his kidney function appears to be back to normal, his urine does not show leukocyte esterase or nitrites. His x-ray of the abdomen shows possible small bowel obstruction or ileus. I did speak with the surgeon on-call Dr. Kristy Nassar he she agreed that the patient can go to the extended care facility safely recommends a laxative and follow-up as needed for the ileus or small bowel obstruction. We will call report to the extended care facility. The patient will be transferred via private auto. He has stable at this time. DIAGNOSTIC RESULTS     EKG: All EKG's are interpreted by the Emergency Department Physician who either signs or Co-signs this chart in the absence of acardiologist.        RADIOLOGY:Allplain film, CT, MRI, and formal ultrasound images (except ED bedside ultrasound) are read by the radiologist and the images and interpretations are directly viewed by the emergency physician. LABS:All lab results were reviewed by myself, and all abnormals are listed below.   Labs Reviewed   CBC WITH AUTO DIFFERENTIAL - Abnormal; Notable for the following components:       Result Value    RBC 3.88 (*)     Hemoglobin 11.9 (*)     Hematocrit 35.4 (*)     Seg Neutrophils 73 (*)     Lymphocytes 13 (*)     Immature Granulocytes 1 (*)     Absolute Lymph # 1.00 (*)     All other components within normal limits   URINE RT REFLEX TO CULTURE - Abnormal; Notable for the following components:    Urine Hgb 2+ (*)     Protein, UA TRACE (*)     Urobilinogen, Urine ELEVATED (*)     Leukocyte Esterase, Urine SMALL (*)     All other components within normal limits   COMPREHENSIVE METABOLIC PANEL W/ REFLEX TO MG FOR LOW K - Abnormal; Notable for the following components:    Glucose 113 (*)     Anion Gap 8 (*)     ALT 53 (*)     AST 43 (*)     Albumin 3.2 (*)     All other components within normal limits   MICROSCOPIC URINALYSIS - Abnormal; Notable for the following components:    Bacteria, UA TRACE (*)     All other components within normal limits         EMERGENCY DEPARTMENT COURSE:   Vitals:    Vitals:    05/12/21 1505   BP: (!) 140/55   Pulse: 74   Resp: 17   Temp: 98.1 °F (36.7 °C)   TempSrc: Oral   SpO2: 97%   Weight: 180 lb (81.6 kg)   Height: 6' 5\" (1.956 m)       The patient was given the following medications while in the emergency department:  Orders Placed This Encounter   Medications    0.9 % sodium chloride infusion    polyethylene glycol (GLYCOLAX) 17 GM/SCOOP powder     Sig: Take 17 g by mouth daily     Dispense:  1530 g     Refill:  1       -------------------------      CRITICAL CARE:       CONSULTS:  None    PROCEDURES:  Procedures    FINAL IMPRESSION      1. Fall, initial encounter New Problem   2. General weakness New Problem   3. Failure to thrive in adult New Problem   4.  Small bowel obstruction [facility])          DISPOSITION/PLAN   DISPOSITION Decision To Discharge 05/12/2021 05:23:16 PM      PATIENT REFERREDTO:  Kofi Santiago DO  [address] [place] [address] [employer]  [address] [place] [address]  [phone]    Schedule an appointment as soon as possible for a visit in 2 days      HOSP[facility] ED  [address] [facility]  [phone]    If symptoms worsen      DISCHARGEMEDICATIONS:  New Prescriptions    POLYETHYLENE GLYCOL (GLYCOLAX) 17 GM/SCOOP POWDER    Take 17 g by mouth daily       (Please note that portions of this note were completed with a voice recognition program.  Efforts were made to edit thedictations but occasionally words are mis-transcribed.)    MATTHIEU Ventura PA-C  05/12/21 9989

## 2021-05-13 LAB
EKG ATRIAL RATE: 69 BPM
EKG P AXIS: 56 DEGREES
EKG P-R INTERVAL: 210 MS
EKG Q-T INTERVAL: 438 MS
EKG QRS DURATION: 156 MS
EKG QTC CALCULATION (BAZETT): 469 MS
EKG R AXIS: 97 DEGREES
EKG T AXIS: 47 DEGREES
EKG VENTRICULAR RATE: 69 BPM

## 2021-05-13 PROCEDURE — 93010 ELECTROCARDIOGRAM REPORT: CPT | Performed by: INTERNAL MEDICINE

## 2021-05-19 ENCOUNTER — OFFICE VISIT (OUTPATIENT)
Dept: UROLOGY | Age: 86
End: 2021-05-19
Payer: MEDICARE

## 2021-05-19 ENCOUNTER — TELEPHONE (OUTPATIENT)
Dept: UROLOGY | Age: 86
End: 2021-05-19

## 2021-05-19 VITALS
WEIGHT: 180 LBS | SYSTOLIC BLOOD PRESSURE: 124 MMHG | DIASTOLIC BLOOD PRESSURE: 66 MMHG | BODY MASS INDEX: 21.34 KG/M2 | TEMPERATURE: 97.5 F

## 2021-05-19 DIAGNOSIS — N40.1 HYPERTROPHY OF PROSTATE WITH URINARY OBSTRUCTION: ICD-10-CM

## 2021-05-19 DIAGNOSIS — R33.9 RETENTION OF URINE: Primary | ICD-10-CM

## 2021-05-19 DIAGNOSIS — N13.8 HYPERTROPHY OF PROSTATE WITH URINARY OBSTRUCTION: ICD-10-CM

## 2021-05-19 PROCEDURE — 99204 OFFICE O/P NEW MOD 45 MIN: CPT | Performed by: PHYSICIAN ASSISTANT

## 2021-05-19 PROCEDURE — G8420 CALC BMI NORM PARAMETERS: HCPCS | Performed by: PHYSICIAN ASSISTANT

## 2021-05-19 PROCEDURE — 1123F ACP DISCUSS/DSCN MKR DOCD: CPT | Performed by: PHYSICIAN ASSISTANT

## 2021-05-19 PROCEDURE — 99211 OFF/OP EST MAY X REQ PHY/QHP: CPT

## 2021-05-19 PROCEDURE — G8427 DOCREV CUR MEDS BY ELIG CLIN: HCPCS | Performed by: PHYSICIAN ASSISTANT

## 2021-05-19 PROCEDURE — 4040F PNEUMOC VAC/ADMIN/RCVD: CPT | Performed by: PHYSICIAN ASSISTANT

## 2021-05-19 PROCEDURE — 1036F TOBACCO NON-USER: CPT | Performed by: PHYSICIAN ASSISTANT

## 2021-05-19 RX ORDER — NITROGLYCERIN 0.4 MG/1
0.4 TABLET SUBLINGUAL EVERY 5 MIN PRN
COMMUNITY

## 2021-05-19 RX ORDER — MAGNESIUM HYDROXIDE/ALUMINUM HYDROXICE/SIMETHICONE 120; 1200; 1200 MG/30ML; MG/30ML; MG/30ML
5 SUSPENSION ORAL EVERY 6 HOURS PRN
COMMUNITY
End: 2021-06-10

## 2021-05-19 RX ORDER — TAMSULOSIN HYDROCHLORIDE 0.4 MG/1
0.4 CAPSULE ORAL DAILY
COMMUNITY
End: 2021-06-02 | Stop reason: SDUPTHER

## 2021-05-19 NOTE — TELEPHONE ENCOUNTER
Nurse from [facility] called again and reported that patient was getting very uncomfortable so a catheter 16 Fr. was inserted.

## 2021-05-19 NOTE — PROGRESS NOTES
HPI:      Patient is a 80 y.o. male in no acute distress. He is alert and oriented to person, place, and time. Patient presents today as a new patient referral for urinary retention and acute kidney injury. Patient is currently at [facility]. Patient is on MiraLAX daily. Patient was in the emergency room on 5/7/2021 as well. At that point he was unable to urinate for at least 2 days. They did scan his bladder for 750 mL and Rasmussen catheter was ultimately placed with 1400 mL returned. Patient did have an elevated creatinine of 5.68. Patient's family refused transfer to emergency facility and therefore he was transferred to [facility] for further evaluation. Patient was seen in the emergency room 5/12/2021 with generalized weakness and fatigue, pain. Patient was having difficulty take care of himself. Patient also had a recent fall in which he lost his balance and hit his head on the toilet. He did not have any loss of conscious. In the emergency room his creatinine was 1.05 on 5/12/2021. Patient has been on Flomax for over a week. Patient was ultimately sent to [facility] for rehabilitation after his emergency room visit. Patient is here today secondary to his urinary retention. He denies having ANY urinary issues prior to recent events. He states he was having a daily BM. Patient did have significant microscopic hematuria on UA, but this was after rasmussen was placed. Denies any gross hematuria.      Past Medical History:   Diagnosis Date    Cataract     GERD (gastroesophageal reflux disease)     Hearing loss     Hyperlipidemia     Osteoarthritis     Stroke ([facility] [address] 2017    Ulcer     in past     Uses hearing aid      Past Surgical History:   Procedure Laterality Date    ARM SURGERY      left arm rebuild     EYE SURGERY      , cataract    SHOULDER SURGERY      right     Outpatient Encounter Medications as of 5/19/2021   Medication Sig Dispense Refill    aluminum & magnesium hydroxide-simethicone (MAALOX) 200-200-20 MG/5ML SUSP suspension Take 5 mLs by mouth every 6 hours as needed for Indigestion      tamsulosin (FLOMAX) 0.4 MG capsule Take 0.4 mg by mouth daily      polyethylene glycol (GLYCOLAX) 17 GM/SCOOP powder Take 17 g by mouth daily 1530 g 1    atorvastatin (LIPITOR) 40 MG tablet Take 1 tablet by mouth nightly 90 tablet 3    clopidogrel (PLAVIX) 75 MG tablet Take 1 tablet by mouth daily 90 tablet 3    calcium carbonate (OSCAL) 500 MG TABS tablet Take 500 mg by mouth daily      loperamide (IMODIUM) 1 MG/5ML solution Take by mouth 4 times daily as needed for Diarrhea      Magnesium Hydroxide (MILK OF MAGNESIA PO) Take by mouth      nitroGLYCERIN (NITROSTAT) 0.4 MG SL tablet Place 0.4 mg under the tongue every 5 minutes as needed for Chest pain up to max of 3 total doses. If no relief after 1 dose, call 911.  Naproxen Sodium (ALEVE) 220 MG CAPS Take 1 tablet by mouth 2 times daily for 7 days 14 capsule 0    aspirin 81 MG chewable tablet Take 1 tablet by mouth daily (Patient not taking: Reported on 5/19/2021) 30 tablet 3    Omega-3 Fatty Acids (FISH OIL) 1000 MG CAPS Take 1,000 mg by mouth daily (Patient not taking: Reported on 5/19/2021)      NONFORMULARY Take 1 tablet by mouth daily neutriview. (Patient not taking: Reported on 5/19/2021)       No facility-administered encounter medications on file as of 5/19/2021.       Current Outpatient Medications on File Prior to Visit   Medication Sig Dispense Refill    aluminum & magnesium hydroxide-simethicone (MAALOX) 200-200-20 MG/5ML SUSP suspension Take 5 mLs by mouth every 6 hours as needed for Indigestion      tamsulosin (FLOMAX) 0.4 MG capsule Take 0.4 mg by mouth daily      polyethylene glycol (GLYCOLAX) 17 GM/SCOOP powder Take 17 g by mouth daily 1530 g 1    atorvastatin (LIPITOR) 40 MG tablet Take 1 tablet by mouth nightly 90 tablet 3    clopidogrel (PLAVIX) 75 MG tablet Take 1 tablet by with no CVA, flank pain, hepatosplenomegaly or hernia. Kidneys normal.  Bladder non-tender and not distended. Lymphatics: no palpable lymphadenopathy  Penis normal  Urethral meatus normal  Scrotal exam normal  Testicles normal bilaterally  Epididymis normal bilaterally  No evidence of inguinal hernia  Anus and perineum normal  Normal rectal tone with no masses  Prostate soft, non-tender to palpation. No palpable nodules. Estimated 40 grams. Seminal vesicles not palpable. Lab Results   Component Value Date    BUN 17 05/12/2021     Lab Results   Component Value Date    CREATININE 1.05 05/12/2021     No results found for: PSA    ASSESSMENT:   Diagnosis Orders   1. Retention of urine     2. Hypertrophy of prostate with urinary obstruction         PLAN:  Continue Flomax    Continue MiraLAX daily    Remove Kapoor catheter today    Patient will be going back to [facility].   We have asked them to do PVRs for 72 hours and prompt him to void every 2 hours while awake    We want them to call us with PVRs later on today    We want them to fax PVRs to us after 72 hours    If patient has PVRs greater than 400 or develops suprapubic pain or inability to urinate they are to place Kapoor catheter and notify our office    Follow-up in 2 weeks with PVR

## 2021-05-19 NOTE — TELEPHONE ENCOUNTER
Anderson Lawson called to report that patient attempted to void at 10 am with no results. Attempted to void at [phone] [id number] with no results, scan 386 ml. At [phone] patient has urge and attempted to void with no results, scan 685 ml. Order reads to insert catheter for scan >400 ml.  Facility is asking if patient can be straight cathed instead per patient request?

## 2021-05-19 NOTE — PATIENT INSTRUCTIONS
Schedule a Vaccine  When you qualify to receive the vaccine per the [address] guidelines, call the [facility]) COVID-19 Vaccination Hotline to schedule your appointment or to get additional information about the [facility]) locations which are offering the COVID-19 vaccine. To be most effective, it's important that you receive two doses of one of the COVID-19 vaccines. -If you are receiving the Murphy Peter vaccine, your second shot will be scheduled as close to 21 days after the first shot as possible. -If you are receiving the Moderna vaccine, your second shot will be scheduled as close to 28 days after the first shot as possible. [address] Vaccination Hotline: [phone]    In partnership with [facility] and [facility] Departments, patients can call [phone], Monday-Friday 8:00am-4:00pm for scheduling at our Hospitals. Or visit the [facility] websites for additional information of vaccine administration locations. Links to [facility]) website and Health Department websites:    [email or web address]/[email or web address]-monitoring-[email or web address]-covid-19/covid-19-vaccine/[place]/luu-[email or web address]    [email or web address]    [email or web address]    SURVEY:    You may be receiving a survey from [employer] regarding your visit today. Please complete the survey to enable us to provide the highest quality of care to you and your family. If you cannot score us a very good on any question, please call the office to discuss how we could have made your experience a very good one. Thank you.     Your MA today: María Donovan

## 2021-05-24 ENCOUNTER — HOSPITAL ENCOUNTER (OUTPATIENT)
Age: 86
Setting detail: SPECIMEN
Discharge: HOME | End: 2021-05-24
Payer: MEDICARE

## 2021-05-24 LAB
ABSOLUTE EOS #: 0.14 K/UL (ref 0–0.44)
ABSOLUTE IMMATURE GRANULOCYTE: <0.03 K/UL (ref 0–0.3)
ABSOLUTE LYMPH #: 1.1 K/UL (ref 1.1–3.7)
ABSOLUTE MONO #: 0.53 K/UL (ref 0.1–1.2)
ALBUMIN SERPL-MCNC: 3 G/DL (ref 3.5–5.2)
ALBUMIN/GLOBULIN RATIO: 0.9 (ref 1–2.5)
ALP BLD-CCNC: 91 U/L (ref 40–129)
ALT SERPL-CCNC: 29 U/L (ref 5–41)
ANION GAP SERPL CALCULATED.3IONS-SCNC: 8 MMOL/L (ref 9–17)
AST SERPL-CCNC: 16 U/L
BASOPHILS # BLD: 0 % (ref 0–2)
BASOPHILS ABSOLUTE: <0.03 K/UL (ref 0–0.2)
BILIRUB SERPL-MCNC: 0.47 MG/DL (ref 0.3–1.2)
BUN BLDV-MCNC: 22 MG/DL (ref 8–23)
BUN/CREAT BLD: 27 (ref 9–20)
CALCIUM SERPL-MCNC: 8.4 MG/DL (ref 8.6–10.4)
CHLORIDE BLD-SCNC: 106 MMOL/L (ref 98–107)
CO2: 23 MMOL/L (ref 20–31)
CREAT SERPL-MCNC: 0.83 MG/DL (ref 0.7–1.2)
DIFFERENTIAL TYPE: ABNORMAL
EOSINOPHILS RELATIVE PERCENT: 3 % (ref 1–4)
GFR AFRICAN AMERICAN: >60 ML/MIN
GFR NON-AFRICAN AMERICAN: >60 ML/MIN
GFR SERPL CREATININE-BSD FRML MDRD: ABNORMAL ML/MIN/{1.73_M2}
GFR SERPL CREATININE-BSD FRML MDRD: ABNORMAL ML/MIN/{1.73_M2}
GLUCOSE BLD-MCNC: 92 MG/DL (ref 70–99)
HCT VFR BLD CALC: 34.9 % (ref 40.7–50.3)
HEMOGLOBIN: 11.2 G/DL (ref 13–17)
IMMATURE GRANULOCYTES: 0 %
LYMPHOCYTES # BLD: 20 % (ref 24–43)
MCH RBC QN AUTO: 29.9 PG (ref 25.2–33.5)
MCHC RBC AUTO-ENTMCNC: 32.1 G/DL (ref 28.4–34.8)
MCV RBC AUTO: 93.3 FL (ref 82.6–102.9)
MONOCYTES # BLD: 10 % (ref 3–12)
NRBC AUTOMATED: 0 PER 100 WBC
PDW BLD-RTO: 13 % (ref 11.8–14.4)
PLATELET # BLD: 213 K/UL (ref 138–453)
PLATELET ESTIMATE: ABNORMAL
PMV BLD AUTO: 8.6 FL (ref 8.1–13.5)
POTASSIUM SERPL-SCNC: 4.3 MMOL/L (ref 3.7–5.3)
RBC # BLD: 3.74 M/UL (ref 4.21–5.77)
RBC # BLD: ABNORMAL 10*6/UL
SEG NEUTROPHILS: 67 % (ref 36–65)
SEGMENTED NEUTROPHILS ABSOLUTE COUNT: 3.57 K/UL (ref 1.5–8.1)
SODIUM BLD-SCNC: 137 MMOL/L (ref 135–144)
TOTAL PROTEIN: 6.3 G/DL (ref 6.4–8.3)
WBC # BLD: 5.4 K/UL (ref 3.5–11.3)
WBC # BLD: ABNORMAL 10*3/UL

## 2021-05-24 PROCEDURE — 36415 COLL VENOUS BLD VENIPUNCTURE: CPT

## 2021-05-24 PROCEDURE — 80053 COMPREHEN METABOLIC PANEL: CPT

## 2021-05-24 PROCEDURE — 85025 COMPLETE CBC W/AUTO DIFF WBC: CPT

## 2021-05-24 PROCEDURE — P9603 ONE-WAY ALLOW PRORATED MILES: HCPCS

## 2021-06-01 ENCOUNTER — HOSPITAL ENCOUNTER (OUTPATIENT)
Age: 86
Setting detail: SPECIMEN
Discharge: HOME | End: 2021-06-01
Payer: MEDICARE

## 2021-06-02 ENCOUNTER — OFFICE VISIT (OUTPATIENT)
Dept: UROLOGY | Age: 86
End: 2021-06-02
Payer: MEDICARE

## 2021-06-02 VITALS
TEMPERATURE: 97.8 F | WEIGHT: 174 LBS | DIASTOLIC BLOOD PRESSURE: 78 MMHG | BODY MASS INDEX: 20.63 KG/M2 | SYSTOLIC BLOOD PRESSURE: 108 MMHG

## 2021-06-02 DIAGNOSIS — N13.8 HYPERTROPHY OF PROSTATE WITH URINARY OBSTRUCTION: ICD-10-CM

## 2021-06-02 DIAGNOSIS — N40.1 HYPERTROPHY OF PROSTATE WITH URINARY OBSTRUCTION: ICD-10-CM

## 2021-06-02 DIAGNOSIS — B37.49 YEAST DERMATITIS OF PENIS: ICD-10-CM

## 2021-06-02 DIAGNOSIS — R33.9 RETENTION OF URINE: Primary | ICD-10-CM

## 2021-06-02 PROCEDURE — G8420 CALC BMI NORM PARAMETERS: HCPCS | Performed by: PHYSICIAN ASSISTANT

## 2021-06-02 PROCEDURE — 1036F TOBACCO NON-USER: CPT | Performed by: PHYSICIAN ASSISTANT

## 2021-06-02 PROCEDURE — 99214 OFFICE O/P EST MOD 30 MIN: CPT | Performed by: PHYSICIAN ASSISTANT

## 2021-06-02 PROCEDURE — 1123F ACP DISCUSS/DSCN MKR DOCD: CPT | Performed by: PHYSICIAN ASSISTANT

## 2021-06-02 PROCEDURE — 99211 OFF/OP EST MAY X REQ PHY/QHP: CPT

## 2021-06-02 PROCEDURE — 4040F PNEUMOC VAC/ADMIN/RCVD: CPT | Performed by: PHYSICIAN ASSISTANT

## 2021-06-02 PROCEDURE — G8427 DOCREV CUR MEDS BY ELIG CLIN: HCPCS | Performed by: PHYSICIAN ASSISTANT

## 2021-06-02 RX ORDER — POLYETHYLENE GLYCOL 3350 17 G/17G
17 POWDER, FOR SOLUTION ORAL 2 TIMES DAILY
Qty: 1530 G | Refills: 1 | Status: SHIPPED | OUTPATIENT
Start: 2021-06-02 | End: 2021-07-01 | Stop reason: ALTCHOICE

## 2021-06-02 RX ORDER — FLUCONAZOLE 100 MG/1
100 TABLET ORAL DAILY
Qty: 7 TABLET | Refills: 0 | Status: SHIPPED | OUTPATIENT
Start: 2021-06-02 | End: 2021-06-09

## 2021-06-02 RX ORDER — TAMSULOSIN HYDROCHLORIDE 0.4 MG/1
0.4 CAPSULE ORAL 2 TIMES DAILY
Qty: 60 CAPSULE | Refills: 11 | Status: SHIPPED | OUTPATIENT
Start: 2021-06-02 | End: 2022-06-08

## 2021-06-02 ASSESSMENT — ENCOUNTER SYMPTOMS
BACK PAIN: 0
SHORTNESS OF BREATH: 0
COUGH: 0
VOMITING: 0
EYE REDNESS: 0
NAUSEA: 0
WHEEZING: 0
COLOR CHANGE: 0
ABDOMINAL PAIN: 0
CONSTIPATION: 1

## 2021-06-02 NOTE — PATIENT INSTRUCTIONS
Schedule a Vaccine  When you qualify to receive the vaccine per the [address] guidelines, call the [facility]) COVID-19 Vaccination Hotline to schedule your appointment or to get additional information about the [facility]) locations which are offering the COVID-19 vaccine. To be most effective, it's important that you receive two doses of one of the COVID-19 vaccines. -If you are receiving the Murphy Peter vaccine, your second shot will be scheduled as close to 21 days after the first shot as possible. -If you are receiving the Moderna vaccine, your second shot will be scheduled as close to 28 days after the first shot as possible. [address] Vaccination Hotline: [phone]    In partnership with [facility] and [facility] HEALTH Departments, patients can call [phone], Monday-Friday 8:00am-4:00pm for scheduling at our Hospitals. Or visit the [facility] websites for additional information of vaccine administration locations. Links to [facility]) website and Health Department websites:    [email or web address]    [email or web address]    [email or web address]    SURVEY:    You may be receiving a survey from [employer] regarding your visit today. Please complete the survey to enable us to provide the highest quality of care to you and your family. If you cannot score us a very good on any question, please call the office to discuss how we could have made your experience a very good one. Thank you.     Your MA today: Jacklyn Leija

## 2021-06-02 NOTE — PROGRESS NOTES
polyethylene glycol (GLYCOLAX) 17 GM/SCOOP powder Take 17 g by mouth daily 1530 g 1    Naproxen Sodium (ALEVE) 220 MG CAPS Take 1 tablet by mouth 2 times daily for 7 days 14 capsule 0    aspirin 81 MG chewable tablet Take 1 tablet by mouth daily (Patient not taking: Reported on 5/19/2021) 30 tablet 3    Omega-3 Fatty Acids (FISH OIL) 1000 MG CAPS Take 1,000 mg by mouth daily (Patient not taking: Reported on 5/19/2021)      NONFORMULARY Take 1 tablet by mouth daily neutriview. (Patient not taking: Reported on 5/19/2021)      calcium carbonate (OSCAL) 500 MG TABS tablet Take 500 mg by mouth daily       No facility-administered encounter medications on file as of 6/2/2021. Current Outpatient Medications on File Prior to Visit   Medication Sig Dispense Refill    atorvastatin (LIPITOR) 40 MG tablet Take 1 tablet by mouth nightly 90 tablet 3    clopidogrel (PLAVIX) 75 MG tablet Take 1 tablet by mouth daily 90 tablet 3    loperamide (IMODIUM) 1 MG/5ML solution Take by mouth 4 times daily as needed for Diarrhea (Patient not taking: Reported on 6/2/2021)      Magnesium Hydroxide (MILK OF MAGNESIA PO) Take by mouth (Patient not taking: Reported on 6/2/2021)      aluminum & magnesium hydroxide-simethicone (MAALOX) 200-200-20 MG/5ML SUSP suspension Take 5 mLs by mouth every 6 hours as needed for Indigestion (Patient not taking: Reported on 6/2/2021)      nitroGLYCERIN (NITROSTAT) 0.4 MG SL tablet Place 0.4 mg under the tongue every 5 minutes as needed for Chest pain up to max of 3 total doses. If no relief after 1 dose, call 911.  (Patient not taking: Reported on 6/2/2021)      Naproxen Sodium (ALEVE) 220 MG CAPS Take 1 tablet by mouth 2 times daily for 7 days 14 capsule 0    aspirin 81 MG chewable tablet Take 1 tablet by mouth daily (Patient not taking: Reported on 5/19/2021) 30 tablet 3    Omega-3 Fatty Acids (FISH OIL) 1000 MG CAPS Take 1,000 mg by mouth daily (Patient not taking: Reported on 5/19/2021) normal.  Lungs: Respiratory effort normal  Abdomen: Soft, non-tender, non-distended  Bladder non-tender and not distended. Penis uncircumcised, wet yeast appearance, easily retractable foreskin  Urethral meatus Kapoor catheter in place  Scrotal exam normal  Testicles normal bilaterally  Epididymis normal bilaterally  No evidence of inguinal hernia  Rectal: Deferred      Lab Results   Component Value Date    BUN 22 05/24/2021     Lab Results   Component Value Date    CREATININE 0.83 05/24/2021     No results found for: PSA    ASSESSMENT:   Diagnosis Orders   1. Retention of urine     2. Hypertrophy of prostate with urinary obstruction     3. Yeast dermatitis of penis           PLAN:  MiraLAX twice a day    Increase Flomax to twice a day    Diflucan x 7 days    BLADDER IRRITANTS     There are several changes you can make to your diet to help improve your urinary symptoms. The following have been shown to cause irritation to the bladder and should be AVOIDED if possible:  ~ Coffee (including decaffeinated)   ~ ALL Tea (including green teas and decaffeinated)  ~ Soda/Pop/carbonated beverages/Energy drinks (especially dark dyed ruben, root beers, mountain dew, etc)  ~These are MUCH worse if they have caffeine, but can also be irritative to the bladder even without caffeine  ~ Alcoholic beverages  ~ Spicy foods (peppers contain capsaicin, which is very irritating to the bladder)  ~ Acidic foods (for example: tomato based foods, orange juice, etc)    We encourage increased water intake, unless you have been placed on a fluid restriction by another provider. Follow-up in 1 week for Kapoor catheter removal, we did want patient to follow-up in 2 weeks for this but patient was adamant about having catheter removed soon as possible. Patient is aware that if he fails Kapoor catheter removal again we would need to proceed with cystoscopy for further evaluation.

## 2021-06-10 ENCOUNTER — OFFICE VISIT (OUTPATIENT)
Dept: UROLOGY | Age: 86
End: 2021-06-10
Payer: MEDICARE

## 2021-06-10 ENCOUNTER — TELEPHONE (OUTPATIENT)
Dept: UROLOGY | Age: 86
End: 2021-06-10

## 2021-06-10 ENCOUNTER — HOSPITAL ENCOUNTER (OUTPATIENT)
Age: 86
Setting detail: SPECIMEN
Discharge: HOME | End: 2021-06-10
Payer: MEDICARE

## 2021-06-10 VITALS
SYSTOLIC BLOOD PRESSURE: 124 MMHG | HEART RATE: 81 BPM | WEIGHT: 172 LBS | DIASTOLIC BLOOD PRESSURE: 80 MMHG | BODY MASS INDEX: 20.4 KG/M2

## 2021-06-10 DIAGNOSIS — R33.9 RETENTION OF URINE: Primary | ICD-10-CM

## 2021-06-10 DIAGNOSIS — N40.1 HYPERTROPHY OF PROSTATE WITH URINARY OBSTRUCTION: ICD-10-CM

## 2021-06-10 DIAGNOSIS — N13.8 BPH WITH OBSTRUCTION/LOWER URINARY TRACT SYMPTOMS: ICD-10-CM

## 2021-06-10 DIAGNOSIS — N40.1 BPH WITH OBSTRUCTION/LOWER URINARY TRACT SYMPTOMS: ICD-10-CM

## 2021-06-10 DIAGNOSIS — N13.8 HYPERTROPHY OF PROSTATE WITH URINARY OBSTRUCTION: ICD-10-CM

## 2021-06-10 DIAGNOSIS — B37.49 YEAST DERMATITIS OF PENIS: ICD-10-CM

## 2021-06-10 DIAGNOSIS — R33.9 RETENTION OF URINE: ICD-10-CM

## 2021-06-10 PROCEDURE — 51798 US URINE CAPACITY MEASURE: CPT | Performed by: NURSE PRACTITIONER

## 2021-06-10 PROCEDURE — 99214 OFFICE O/P EST MOD 30 MIN: CPT | Performed by: NURSE PRACTITIONER

## 2021-06-10 PROCEDURE — G8420 CALC BMI NORM PARAMETERS: HCPCS | Performed by: NURSE PRACTITIONER

## 2021-06-10 PROCEDURE — 51702 INSERT TEMP BLADDER CATH: CPT | Performed by: NURSE PRACTITIONER

## 2021-06-10 PROCEDURE — G8428 CUR MEDS NOT DOCUMENT: HCPCS | Performed by: NURSE PRACTITIONER

## 2021-06-10 PROCEDURE — 1123F ACP DISCUSS/DSCN MKR DOCD: CPT | Performed by: NURSE PRACTITIONER

## 2021-06-10 PROCEDURE — 87086 URINE CULTURE/COLONY COUNT: CPT

## 2021-06-10 PROCEDURE — 87186 SC STD MICRODIL/AGAR DIL: CPT

## 2021-06-10 PROCEDURE — 1036F TOBACCO NON-USER: CPT | Performed by: NURSE PRACTITIONER

## 2021-06-10 PROCEDURE — 4040F PNEUMOC VAC/ADMIN/RCVD: CPT | Performed by: NURSE PRACTITIONER

## 2021-06-10 PROCEDURE — 87088 URINE BACTERIA CULTURE: CPT

## 2021-06-10 RX ORDER — CIPROFLOXACIN 500 MG/1
500 TABLET, FILM COATED ORAL 2 TIMES DAILY
Qty: 20 TABLET | Refills: 0 | Status: SHIPPED | OUTPATIENT
Start: 2021-06-10 | End: 2021-06-20

## 2021-06-10 ASSESSMENT — ENCOUNTER SYMPTOMS
EYE REDNESS: 0
ABDOMINAL PAIN: 1
WHEEZING: 0
SHORTNESS OF BREATH: 0
EYE PAIN: 0
COUGH: 0
ABDOMINAL PAIN: 0
BACK PAIN: 0
NAUSEA: 0
VOMITING: 0
VOMITING: 0
COLOR CHANGE: 0
COUGH: 0
COLOR CHANGE: 0
CONSTIPATION: 0
BACK PAIN: 0
SHORTNESS OF BREATH: 0
WHEEZING: 0
NAUSEA: 0
EYE REDNESS: 0

## 2021-06-10 NOTE — PROGRESS NOTES
Random bladderscan performed in office today:  Had rasmussen catheter removed in office today at 0830.  Patient has not voided  scan = 198 mL

## 2021-06-10 NOTE — PROGRESS NOTES
HPI:          Patient is a 80 y.o. male in no acute distress. He is alert and oriented to person, place, and time. History  Patient presents today as a new patient referral for urinary retention and acute kidney injury. Patient is currently at [facility]. Patient is on MiraLAX daily. Patient was in the emergency room on 5/7/2021 as well. At that point he was unable to urinate for at least 2 days. They did scan his bladder for 750 mL and Rasmussen catheter was ultimately placed with 1400 mL returned. Patient did have an elevated creatinine of 5.68. Patient's family refused transfer to emergency facility and therefore he was transferred to [facility] for further evaluation. Patient was seen in the emergency room 5/12/2021 with generalized weakness and fatigue, pain. Patient was having difficulty take care of himself. Patient also had a recent fall in which he lost his balance and hit his head on the toilet. He did not have any loss of conscious. In the emergency room his creatinine was 1.05 on 5/12/2021. Patient has been on Flomax for over a week. Patient was ultimately sent to [facility] for rehabilitation after his emergency room visit. Patient is here today secondary to his urinary retention. He denies having ANY urinary issues prior to recent events. He states he was having a daily BM. Patient did have significant microscopic hematuria on UA, but this was after rasmussen was placed. Denies any gross hematuria. Currently  Patient is here today for a void trial.  At the last visit we did increase his Flomax. We also gave him a round of MiraLAX. Patient did have his Rasmussen catheter removed today. Patient does appear to have resolved most of his issues with yeast.  He does have a slight amount of redness around the scrotum. He is reporting no itching or burning in the genitalia. He has had no recent gross hematuria. No pain.   Patient is happy to have NONFORMULARY Take 1 tablet by mouth daily neutriview. (Patient not taking: Reported on 5/19/2021)       No facility-administered encounter medications on file as of 6/10/2021. Current Outpatient Medications on File Prior to Visit   Medication Sig Dispense Refill    tamsulosin (FLOMAX) 0.4 MG capsule Take 1 capsule by mouth 2 times daily 60 capsule 11    polyethylene glycol (GLYCOLAX) 17 GM/SCOOP powder Take 17 g by mouth 2 times daily 1530 g 1    atorvastatin (LIPITOR) 40 MG tablet Take 1 tablet by mouth nightly 90 tablet 3    clopidogrel (PLAVIX) 75 MG tablet Take 1 tablet by mouth daily 90 tablet 3    calcium carbonate (OSCAL) 500 MG TABS tablet Take 500 mg by mouth daily      loperamide (IMODIUM) 1 MG/5ML solution Take by mouth 4 times daily as needed for Diarrhea (Patient not taking: Reported on 6/2/2021)      Magnesium Hydroxide (MILK OF MAGNESIA PO) Take by mouth (Patient not taking: Reported on 6/2/2021)      aluminum & magnesium hydroxide-simethicone (MAALOX) 200-200-20 MG/5ML SUSP suspension Take 5 mLs by mouth every 6 hours as needed for Indigestion (Patient not taking: Reported on 6/2/2021)      nitroGLYCERIN (NITROSTAT) 0.4 MG SL tablet Place 0.4 mg under the tongue every 5 minutes as needed for Chest pain up to max of 3 total doses. If no relief after 1 dose, call 911. (Patient not taking: Reported on 6/2/2021)      Naproxen Sodium (ALEVE) 220 MG CAPS Take 1 tablet by mouth 2 times daily for 7 days 14 capsule 0    aspirin 81 MG chewable tablet Take 1 tablet by mouth daily (Patient not taking: Reported on 5/19/2021) 30 tablet 3    Omega-3 Fatty Acids (FISH OIL) 1000 MG CAPS Take 1,000 mg by mouth daily (Patient not taking: Reported on 5/19/2021)      NONFORMULARY Take 1 tablet by mouth daily neutriview. (Patient not taking: Reported on 5/19/2021)       No current facility-administered medications on file prior to visit. Patient has no known allergies.   Family History   Problem Relation Age of Onset    Cancer Mother         bladder    Heart Disease Father     High Blood Pressure Brother     Stroke Brother     High Blood Pressure Brother     Stroke Brother      Social History     Tobacco Use   Smoking Status Former Smoker   Smokeless Tobacco Never Used       Social History     Substance and Sexual Activity   Alcohol Use Yes    Alcohol/week: 4.0 standard drinks    Types: 4 Cans of beer per week       Review of Systems   Constitutional: Negative for appetite change, chills and fever. Eyes: Negative for pain, redness and visual disturbance. Respiratory: Negative for cough, shortness of breath and wheezing. Cardiovascular: Negative for chest pain and leg swelling. Gastrointestinal: Negative for abdominal pain, nausea and vomiting. Genitourinary: Positive for difficulty urinating. Negative for discharge, dysuria, flank pain, frequency, hematuria, scrotal swelling and testicular pain. Musculoskeletal: Negative for back pain, joint swelling and myalgias. Skin: Negative for color change, rash and wound. Neurological: Negative for dizziness, tremors and numbness. Hematological: Negative for adenopathy. Does not bruise/bleed easily. /80 (Site: Right Upper Arm, Position: Sitting, Cuff Size: Medium Adult)   Pulse 81   Wt 172 lb (78 kg)   BMI 20.40 kg/m²       PHYSICAL EXAM:  Constitutional: Patient in no acute distress; Neuro: alert and oriented to person place and time. Psych: Mood and affect normal.  Skin: Normal  Lungs: Respiratory effort normal  Cardiovascular:  Normal peripheral pulses  Abdomen: Soft, non-tender, non-distended with no CVA, flank pain  Bladder non-tender and not distended.   Lymphatics: no palpable lymphadenopathy  Penis normal  Urethral meatus normal  Scrotal exam normal  Testicles normal bilaterally  Epididymis normal bilaterally  No evidence of inguinal hernia      Lab Results   Component Value Date    BUN 22 05/24/2021     Lab Results   Component Value Date    CREATININE 0.83 05/24/2021     No results found for: PSA    ASSESSMENT:  This is a 80 y.o. male with the following diagnoses:   Diagnosis Orders   1. Retention of urine     2. Hypertrophy of prostate with urinary obstruction     3. Yeast dermatitis of penis         PLAN:  Patient will have Kapoor catheter removed. He will follow-up with us in 2 weeks for post void residual.  He will discontinue all creams and no more Diflucan. No pain identified today.

## 2021-06-10 NOTE — PATIENT INSTRUCTIONS
Take flomax twice per day    Continue miralax twice per day    Take cipro (antibiotics) twice per day with food

## 2021-06-10 NOTE — PROGRESS NOTES
HPI:          Patient is a 80 y.o. male in no acute distress. He is alert and oriented to person, place, and time. History  5/2021 referral for urinary retention and acute kidney injury. Kapoor catheter was placed for 1400 mL. Creatinine was 5.68. Patient denies having any urinary symptoms prior to developing urinary retention. Started on flomax    6/2/2021 Kapoor removed, then replaced due to the inability to urinate and suprapubic pain. Only having a bowel movement every other day   Increase Flomax to twice per day     Increase MiraLAX to twice per day    Today  Here today due to the suprapubic pain and the inability to urinate. His catheter was removed this morning. He returned to the office approximately at 1 PM and only had 195 mL in his bladder. He felt bloated, but no overt bladder pain. Now here in the office he is thriving in pain. PVR shows 610ml. Past Medical History:   Diagnosis Date    Cataract     GERD (gastroesophageal reflux disease)     Hearing loss     Hyperlipidemia     Osteoarthritis     Stroke ([facility] [address] 2017    Ulcer     in past     Uses hearing aid      Past Surgical History:   Procedure Laterality Date    ARM SURGERY      left arm rebuild     EYE SURGERY      , cataract    SHOULDER SURGERY      right     Outpatient Encounter Medications as of 6/10/2021   Medication Sig Dispense Refill    ciprofloxacin (CIPRO) 500 MG tablet Take 1 tablet by mouth 2 times daily for 10 days 20 tablet 0    tamsulosin (FLOMAX) 0.4 MG capsule Take 1 capsule by mouth 2 times daily 60 capsule 11    polyethylene glycol (GLYCOLAX) 17 GM/SCOOP powder Take 17 g by mouth 2 times daily 1530 g 1    nitroGLYCERIN (NITROSTAT) 0.4 MG SL tablet Place 0.4 mg under the tongue every 5 minutes as needed for Chest pain up to max of 3 total doses. If no relief after 1 dose, call 911.  (Patient not taking: Reported on 6/2/2021)      [DISCONTINUED] loperamide (IMODIUM) 1 MG/5ML solution Take by mouth 4 times daily as needed for Diarrhea (Patient not taking: Reported on 6/2/2021)      [DISCONTINUED] Magnesium Hydroxide (MILK OF MAGNESIA PO) Take by mouth (Patient not taking: Reported on 6/2/2021)      [DISCONTINUED] aluminum & magnesium hydroxide-simethicone (MAALOX) 200-200-20 MG/5ML SUSP suspension Take 5 mLs by mouth every 6 hours as needed for Indigestion (Patient not taking: Reported on 6/2/2021)      Naproxen Sodium (ALEVE) 220 MG CAPS Take 1 tablet by mouth 2 times daily for 7 days 14 capsule 0    atorvastatin (LIPITOR) 40 MG tablet Take 1 tablet by mouth nightly 90 tablet 3    clopidogrel (PLAVIX) 75 MG tablet Take 1 tablet by mouth daily 90 tablet 3    [DISCONTINUED] aspirin 81 MG chewable tablet Take 1 tablet by mouth daily (Patient not taking: Reported on 5/19/2021) 30 tablet 3    Omega-3 Fatty Acids (FISH OIL) 1000 MG CAPS Take 1,000 mg by mouth daily (Patient not taking: Reported on 5/19/2021)      calcium carbonate (OSCAL) 500 MG TABS tablet Take 500 mg by mouth daily      [DISCONTINUED] NONFORMULARY Take 1 tablet by mouth daily neutriview. (Patient not taking: Reported on 5/19/2021)       No facility-administered encounter medications on file as of 6/10/2021. Current Outpatient Medications on File Prior to Visit   Medication Sig Dispense Refill    tamsulosin (FLOMAX) 0.4 MG capsule Take 1 capsule by mouth 2 times daily 60 capsule 11    polyethylene glycol (GLYCOLAX) 17 GM/SCOOP powder Take 17 g by mouth 2 times daily 1530 g 1    nitroGLYCERIN (NITROSTAT) 0.4 MG SL tablet Place 0.4 mg under the tongue every 5 minutes as needed for Chest pain up to max of 3 total doses. If no relief after 1 dose, call 911.  (Patient not taking: Reported on 6/2/2021)      Naproxen Sodium (ALEVE) 220 MG CAPS Take 1 tablet by mouth 2 times daily for 7 days 14 capsule 0    atorvastatin (LIPITOR) 40 MG tablet Take 1 tablet by mouth nightly 90 tablet 3    clopidogrel (PLAVIX) 75 MG tablet Take 1 tablet by mouth daily 90 tablet 3    Omega-3 Fatty Acids (FISH OIL) 1000 MG CAPS Take 1,000 mg by mouth daily (Patient not taking: Reported on 5/19/2021)      calcium carbonate (OSCAL) 500 MG TABS tablet Take 500 mg by mouth daily       No current facility-administered medications on file prior to visit. Patient has no known allergies. Family History   Problem Relation Age of Onset    Cancer Mother         bladder    Heart Disease Father     High Blood Pressure Brother     Stroke Brother     High Blood Pressure Brother     Stroke Brother      Social History     Tobacco Use   Smoking Status Former Smoker   Smokeless Tobacco Never Used       Social History     Substance and Sexual Activity   Alcohol Use Yes    Alcohol/week: 4.0 standard drinks    Types: 4 Cans of beer per week       Review of Systems   Constitutional: Negative for appetite change, chills and fever. Eyes: Negative for redness and visual disturbance. Respiratory: Negative for cough, shortness of breath and wheezing. Cardiovascular: Negative for chest pain and leg swelling. Gastrointestinal: Positive for abdominal pain. Negative for constipation, nausea and vomiting. Genitourinary: Positive for decreased urine volume and difficulty urinating. Negative for discharge, dysuria, enuresis, flank pain, frequency, hematuria, penile pain, scrotal swelling, testicular pain and urgency. Musculoskeletal: Negative for back pain, joint swelling and myalgias. Skin: Negative for color change, rash and wound. Neurological: Negative for dizziness, tremors and numbness. Hematological: Negative for adenopathy. Does not bruise/bleed easily. There were no vitals taken for this visit. PHYSICAL EXAM:  Constitutional: Patient in no acute distress; Neuro: alert and oriented to person place and time.     Psych: Mood and affect normal.  Skin: Normal  Lungs: Respiratory effort normal  Cardiovascular:  Normal peripheral pulses  Abdomen: Soft, non-tender, non-distended with no CVA, flank pain  Bladder non-tender and not distended. Lymphatics: no palpable lymphadenopathy  Penis normal  Urethral meatus normal  Scrotal exam normal      Lab Results   Component Value Date    BUN 22 05/24/2021     Lab Results   Component Value Date    CREATININE 0.83 05/24/2021     No results found for: PSA    ASSESSMENT:   Diagnosis Orders   1. Retention of urine  Culture, Urine   2. BPH with obstruction/lower urinary tract symptoms  Culture, Urine         PLAN:  Kapoor catheter placed for 620 mL    We will send urine for culture    Start empiric Cipro twice per day.   He was instructed to take this antibiotic with food    Continue Flomax twice per day    Continue MiraLAX twice per day    Follow-up for cystoscopy

## 2021-06-10 NOTE — TELEPHONE ENCOUNTER
Patient's caregiver campos called to report since patient had catheter removed in office this morning he has not urinated yet. She reports he drank 48 ounces of water and juice, and 8 oz of milk. He has tried to urinate 4 times since he has been home after 0930. Denies abdominal pain. She states he says, \"his stomach feels full, but doesn't hurt\".

## 2021-06-10 NOTE — PROGRESS NOTES
Random bladderscan performed in office today:  Patient had rasmussen catheter removed at 0830 in office today.   Scan: 610 mL Statement Selected

## 2021-06-12 LAB
CULTURE: ABNORMAL
Lab: ABNORMAL
SPECIMEN DESCRIPTION: ABNORMAL

## 2021-06-14 ENCOUNTER — TELEPHONE (OUTPATIENT)
Dept: UROLOGY | Age: 86
End: 2021-06-14

## 2021-06-28 ENCOUNTER — PROCEDURE VISIT (OUTPATIENT)
Dept: UROLOGY | Age: 86
End: 2021-06-28
Payer: MEDICARE

## 2021-06-28 VITALS — DIASTOLIC BLOOD PRESSURE: 60 MMHG | SYSTOLIC BLOOD PRESSURE: 90 MMHG

## 2021-06-28 DIAGNOSIS — N13.8 BPH WITH OBSTRUCTION/LOWER URINARY TRACT SYMPTOMS: Primary | ICD-10-CM

## 2021-06-28 DIAGNOSIS — N40.1 BPH WITH OBSTRUCTION/LOWER URINARY TRACT SYMPTOMS: Primary | ICD-10-CM

## 2021-06-28 DIAGNOSIS — R33.9 RETENTION OF URINE: ICD-10-CM

## 2021-06-28 PROCEDURE — 4040F PNEUMOC VAC/ADMIN/RCVD: CPT | Performed by: UROLOGY

## 2021-06-28 PROCEDURE — 99214 OFFICE O/P EST MOD 30 MIN: CPT | Performed by: UROLOGY

## 2021-06-28 PROCEDURE — 1123F ACP DISCUSS/DSCN MKR DOCD: CPT | Performed by: UROLOGY

## 2021-06-28 PROCEDURE — 52000 CYSTOURETHROSCOPY: CPT | Performed by: UROLOGY

## 2021-06-28 PROCEDURE — G8427 DOCREV CUR MEDS BY ELIG CLIN: HCPCS | Performed by: UROLOGY

## 2021-06-28 PROCEDURE — 1036F TOBACCO NON-USER: CPT | Performed by: UROLOGY

## 2021-06-28 PROCEDURE — G8420 CALC BMI NORM PARAMETERS: HCPCS | Performed by: UROLOGY

## 2021-06-28 ASSESSMENT — ENCOUNTER SYMPTOMS
NAUSEA: 0
BACK PAIN: 0
EYE PAIN: 0
WHEEZING: 0
COLOR CHANGE: 0
COUGH: 0
EYE REDNESS: 0
SHORTNESS OF BREATH: 0
ABDOMINAL PAIN: 0
VOMITING: 0

## 2021-06-28 NOTE — PROGRESS NOTES
HPI:          Patient is a 80 y.o. male in no acute distress. He is alert and oriented to person, place, and time. History  5/2021 referral for urinary retention and acute kidney injury. Kapoor catheter was placed for 1400 mL. Creatinine was 5.68. Patient denies having any urinary symptoms prior to developing urinary retention. Started on flomax     6/2/2021 Kapoor removed, then replaced due to the inability to urinate and suprapubic pain. Only having a bowel movement every other day              Increase Flomax to twice per day                 Increase MiraLAX to twice per day      Currently  Patient is here today for lower tract visualization. This is secondary to lower urinary tract symptoms. Patient has had elevated PVRs. These volumes have been greater than 600. Cystoscopy Procedure Note    Pre-operative Diagnosis: LUTs    Post-operative Diagnosis: Same     Surgeon: Edie Li    Assistants: None    Anesthesia : Local    Procedure Details   The risks, benefits, complications, treatment options, and expected outcomes were discussed with the patient. The patient concurred with the proposed plan, giving informed consent. Cystoscopy was performed today under local anesthesia, using sterile technique. The patient was placed in the dorsal lithotomy position, prepped with CHG, and draped in the usual sterile fashion. A 14 Gabonese flexible cystoscope was used to systematically inspect both the urethra and bladder in their entirety. Findings:  Anterior urethra: normal without strictures  Hyperplasia: trilobar  Bladder: Normal mucosa, without lesions. Ureteral orifice(s) was/were seen in the normal position and effluxing clear urine  Trabeculations No  Diverticulum No  Description:          Specimens: Cytology/urine culture No                 Complications:  None; patient tolerated the procedure well.            Disposition: home           Condition: stable        Past Medical History:   Diagnosis Date    Cataract     GERD (gastroesophageal reflux disease)     Hearing loss     Hyperlipidemia     Osteoarthritis     Stroke ([facility] [address] 2017    Ulcer     in past     Uses hearing aid      Past Surgical History:   Procedure Laterality Date    ARM SURGERY      left arm rebuild     EYE SURGERY      , cataract    SHOULDER SURGERY      right     Outpatient Encounter Medications as of 6/28/2021   Medication Sig Dispense Refill    tamsulosin (FLOMAX) 0.4 MG capsule Take 1 capsule by mouth 2 times daily 60 capsule 11    polyethylene glycol (GLYCOLAX) 17 GM/SCOOP powder Take 17 g by mouth 2 times daily 1530 g 1    atorvastatin (LIPITOR) 40 MG tablet Take 1 tablet by mouth nightly 90 tablet 3    clopidogrel (PLAVIX) 75 MG tablet Take 1 tablet by mouth daily 90 tablet 3    calcium carbonate (OSCAL) 500 MG TABS tablet Take 500 mg by mouth daily      nitroGLYCERIN (NITROSTAT) 0.4 MG SL tablet Place 0.4 mg under the tongue every 5 minutes as needed for Chest pain up to max of 3 total doses. If no relief after 1 dose, call 911. (Patient not taking: Reported on 6/2/2021)      Naproxen Sodium (ALEVE) 220 MG CAPS Take 1 tablet by mouth 2 times daily for 7 days 14 capsule 0    Omega-3 Fatty Acids (FISH OIL) 1000 MG CAPS Take 1,000 mg by mouth daily (Patient not taking: Reported on 5/19/2021)       No facility-administered encounter medications on file as of 6/28/2021.       Current Outpatient Medications on File Prior to Visit   Medication Sig Dispense Refill    tamsulosin (FLOMAX) 0.4 MG capsule Take 1 capsule by mouth 2 times daily 60 capsule 11    polyethylene glycol (GLYCOLAX) 17 GM/SCOOP powder Take 17 g by mouth 2 times daily 1530 g 1    atorvastatin (LIPITOR) 40 MG tablet Take 1 tablet by mouth nightly 90 tablet 3    clopidogrel (PLAVIX) 75 MG tablet Take 1 tablet by mouth daily 90 tablet 3    calcium carbonate (OSCAL) 500 MG TABS tablet Take 500 mg by mouth daily      nitroGLYCERIN (NITROSTAT) 0.4 MG SL tablet Place 0.4 mg under the tongue every 5 minutes as needed for Chest pain up to max of 3 total doses. If no relief after 1 dose, call 911. (Patient not taking: Reported on 6/2/2021)      Naproxen Sodium (ALEVE) 220 MG CAPS Take 1 tablet by mouth 2 times daily for 7 days 14 capsule 0    Omega-3 Fatty Acids (FISH OIL) 1000 MG CAPS Take 1,000 mg by mouth daily (Patient not taking: Reported on 5/19/2021)       No current facility-administered medications on file prior to visit. Patient has no known allergies. Family History   Problem Relation Age of Onset    Cancer Mother         bladder    Heart Disease Father     High Blood Pressure Brother     Stroke Brother     High Blood Pressure Brother     Stroke Brother      Social History     Tobacco Use   Smoking Status Former Smoker   Smokeless Tobacco Never Used       Social History     Substance and Sexual Activity   Alcohol Use Yes    Alcohol/week: 4.0 standard drinks    Types: 4 Cans of beer per week       Review of Systems   Constitutional: Negative for appetite change, chills and fever. Eyes: Negative for pain, redness and visual disturbance. Respiratory: Negative for cough, shortness of breath and wheezing. Cardiovascular: Negative for chest pain and leg swelling. Gastrointestinal: Negative for abdominal pain, nausea and vomiting. Genitourinary: Negative for difficulty urinating, discharge, dysuria, flank pain, frequency, hematuria, scrotal swelling and testicular pain. Musculoskeletal: Negative for back pain, joint swelling and myalgias. Skin: Negative for color change, rash and wound. Neurological: Negative for dizziness, tremors and numbness. Hematological: Negative for adenopathy. Does not bruise/bleed easily. BP 90/60 (Site: Left Upper Arm, Position: Sitting, Cuff Size: Large Adult)       PHYSICAL EXAM:  Constitutional: Patient in no acute distress;    Neuro: alert and oriented to person place and time.    Psych: Mood and affect normal.  Skin: Normal  Lungs: Respiratory effort normal  Cardiovascular:  Normal peripheral pulses  Abdomen: Soft, non-tender, non-distended with no CVA, flank pain  Bladder non-tender and not distended. Lymphatics: no palpable lymphadenopathy  Penis normal  Urethral meatus normal  Scrotal exam normal  Testicles normal bilaterally  Epididymis normal bilaterally  No evidence of inguinal hernia      Lab Results   Component Value Date    BUN 22 05/24/2021     Lab Results   Component Value Date    CREATININE 0.83 05/24/2021     No results found for: PSA    ASSESSMENT:  This is a 80 y.o. male with the following diagnoses:   Diagnosis Orders   1. BPH with obstruction/lower urinary tract symptoms  [place] CYSTOURETHROSCOPY   2. Retention of urine  [place] CYSTOURETHROSCOPY       PLAN:  Discussed risks and benefits of PVP Greenlight to include, but not limited: risk of anesthesia, bleeding, infection, injury to the  tract, and retrograde ejaculation. This includes major risks such as: bladder perforation or injury to the rectum. We also discussed the need for post-operative rasmussen catheter. We discussed a healing period of 12 weeks. This healing period includes irritative voiding symptoms, but he understand that each case is different and may take more or less healing time. Patient is amendable to schedule.

## 2021-06-28 NOTE — PROGRESS NOTES
Catheter balloon deflated and previous catheter removed without difficulty. New 16F rasmussen coude catheter inserted into the bladder via urethra after cysto under sterile technique without difficulty, with 10 mL sterile water instilled into balloon. Return of >100 mL urine. Pt tolerated catheter insertion well. Pt was instructed on catheter care and sent home with printed information. Pt advised to call office if develops pain or fever, leaking around catheter, if catheter stops draining, or for any concerns.       CATHETER  lot number: [id number]  Expiration date: 11-      0.9% SODIUM CHLORIDE 10 ML  Lot number: [id number]  Expiration date: 01-      LIDOCAINE HYDROCHLORIDE JELLY 2%  Lot number: [id number]  Expiration date: 12-22

## 2021-06-28 NOTE — PROGRESS NOTES
During cystoscopy the following was utilized on patient with no adverse affects:    45% SODIUM CHLORIDE 500 ML BAG  Lot number: [id number]  Expiration date: [address] 2%   Lot number: [id number]  Expiration date: 12-22

## 2021-06-29 NOTE — PROGRESS NOTES
Patient's caregiver Esther Pérez instructed on the pre-operative, intra-operative, and post-operative process. Patient's caregiver instructed on pt's NPO status. Medication instructions and Pre operative instruction sheet reviewed over the phone with caregiver.  Pt's caregiver will call back tomorrow to verify all current medications while she is at the patients home and further medication instructions will be reviewed at that time according to his updated medication list.

## 2021-07-01 NOTE — PROGRESS NOTES
Spoke with Waylon Allison pt's caregiver. Medications updated in Epic. Pt has an appointment with Dr. Margarette Ramirez on 7/6/21 and will receive plavix instructions from MD at clearance appointment. Caregiver verbalizes understanding.

## 2021-07-12 ENCOUNTER — ANESTHESIA EVENT (OUTPATIENT)
Dept: OPERATING ROOM | Age: 86
End: 2021-07-12
Payer: MEDICARE

## 2021-07-12 PROBLEM — N13.8 BPH WITH OBSTRUCTION/LOWER URINARY TRACT SYMPTOMS: Status: ACTIVE | Noted: 2021-07-12

## 2021-07-12 PROBLEM — N40.1 BPH WITH OBSTRUCTION/LOWER URINARY TRACT SYMPTOMS: Status: ACTIVE | Noted: 2021-07-12

## 2021-07-12 NOTE — H&P
History and Physical    Patient:  Nate Savage  MRN: [id number]    CHIEF COMPLAINT:  LUTs    HISTORY OF PRESENT ILLNESS:   The patient is a 80 y.o. male who presents with LUTs. History  5/2021 referral for urinary retention and acute kidney injury.  Kapoor catheter was placed for 1400 mL.  Creatinine was 5.68.  Patient denies having any urinary symptoms prior to developing urinary retention.                Started on flomax     6/2/2021 Kapoor removed, then replaced due to the inability to urinate and suprapubic pain.  Only having a bowel movement every other day              Increase Flomax to twice per day                 Increase MiraLAX to twice per day       Past Medical History:    Past Medical History:   Diagnosis Date    Cataract     GERD (gastroesophageal reflux disease)     Hearing loss     Hyperlipidemia     Osteoarthritis     Stroke ([facility] [address] 2017    Ulcer     in past     Uses hearing aid        Past Surgical History:    Past Surgical History:   Procedure Laterality Date    ARM SURGERY      left arm rebuild     EYE SURGERY      , cataract    SHOULDER SURGERY      right       Medications Prior to Admission:    Prior to Admission medications    Medication Sig Start Date End Date Taking? Authorizing Provider   tamsulosin (FLOMAX) 0.4 MG capsule Take 1 capsule by mouth 2 times daily 6/2/21   Zoe Benz PA-C   nitroGLYCERIN (NITROSTAT) 0.4 MG SL tablet Place 0.4 mg under the tongue every 5 minutes as needed for Chest pain up to max of 3 total doses. If no relief after 1 dose, call 911. Patient not taking: Reported on 6/2/2021    Historical Provider, MD   atorvastatin (LIPITOR) 40 MG tablet Take 1 tablet by mouth nightly 7/5/17   Roxann Baez MD   clopidogrel (PLAVIX) 75 MG tablet Take 1 tablet by mouth daily 7/5/17   Roxann Baez MD       Allergies:  Patient has no known allergies. Social History:    Social History     Socioeconomic History    Marital status:   Spouse name: Not on file    Number of children: Not on file    Years of education: Not on file    Highest education level: Not on file   Occupational History    Not on file   Tobacco Use    Smoking status: Former Smoker    Smokeless tobacco: Never Used   Substance and Sexual Activity    Alcohol use: Yes     Alcohol/week: 4.0 standard drinks     Types: 4 Cans of beer per week    Drug use: No    Sexual activity: Not on file   Other Topics Concern    Not on file   Social History Narrative    Not on file     Social Determinants of Health     Financial Resource Strain:     Difficulty of Paying Living Expenses:    Food Insecurity:     Worried About Running Out of Food in the Last Year:     [address] in the Last Year:    Transportation Needs:     Lack of Transportation (Medical):  Lack of Transportation (Non-Medical):    Physical Activity:     Days of Exercise per Week:     Minutes of Exercise per Session:    Stress:     Feeling of Stress :    Social Connections:     Frequency of Communication with Friends and Family:     Frequency of Social Gatherings with Friends and Family:     Attends Mandaen Services:     Active Member of Clubs or Organizations:     Attends Club or Organization Meetings:     Marital Status:    Intimate Partner Violence:     Fear of Current or Ex-Partner:     Emotionally Abused:     Physically Abused:     Sexually Abused:        Family History:    Family History   Problem Relation Age of Onset    Cancer Mother         bladder    Heart Disease Father     High Blood Pressure Brother     Stroke Brother     High Blood Pressure Brother     Stroke Brother        REVIEW OF SYSTEMS:  All systems reviewed and negative except for that already noted in the HPI. Physical Exam:      No data found. Constitutional: Patient in no acute distress; Neuro: alert and oriented to person place and time.     Psych: Mood and affect normal.  Skin: Normal  Lungs: Respiratory effort normal  Cardiovascular:  Normal peripheral pulses  Abdomen: Soft, non-tender, non-distended with no CVA, flank pain, hepatosplenomegaly or hernia. Kidneys normal.  Bladder non-tender and not distended. Lymphatics: no palpable lymphadenopathy  Penis normal and circumcised  Urethral meatus normal  Scrotal exam normal  Testicles normal bilaterally  Epididymis normal bilaterally  No evidence of inguinal hernia  Anus and perineum normal  Normal rectal tone with no masses  Prostate soft, non-tender to palpation. No palpable nodules. Estimated 40 grams. Seminal vesicles not palpable. LABS:   No results for input(s): WBC, HGB, HCT, MCV, PLT in the last 72 hours. No results for input(s): NA, K, CL, CO2, PHOS, BUN, CREATININE in the last 72 hours. Invalid input(s): CA  No results found for: PSA    Additional Lab/culture results:    Urinalysis: No results for input(s): COLORU, PHUR, LABCAST, WBCUA, RBCUA, MUCUS, TRICHOMONAS, YEAST, BACTERIA, CLARITYU, SPECGRAV, LEUKOCYTESUR, UROBILINOGEN, Zollie Bowens in the last 72 hours.     Invalid input(s): NITRATE, GLUCOSEUKETONESUAMORPHOUS     -----------------------------------------------------------------  Imaging Results:    Assessment and Plan   Impression:    Patient Active Problem List   Diagnosis    Right-sided muscle weakness    Hypertension    Vitamin D deficiency    Acute ischemic lacunar infarct     Right sided weakness    BPH with obstruction/lower urinary tract symptoms       Plan: MOISES Guajardo MD  5:05 PM 7/12/2021

## 2021-07-13 ENCOUNTER — ANESTHESIA (OUTPATIENT)
Dept: OPERATING ROOM | Age: 86
End: 2021-07-13
Payer: MEDICARE

## 2021-07-13 ENCOUNTER — HOSPITAL ENCOUNTER (OUTPATIENT)
Age: 86
Setting detail: OUTPATIENT SURGERY
Discharge: HOME | End: 2021-07-13
Attending: UROLOGY | Admitting: UROLOGY
Payer: MEDICARE

## 2021-07-13 VITALS
WEIGHT: 175.8 LBS | HEIGHT: 76 IN | OXYGEN SATURATION: 95 % | BODY MASS INDEX: 21.41 KG/M2 | TEMPERATURE: 97.2 F | RESPIRATION RATE: 16 BRPM | DIASTOLIC BLOOD PRESSURE: 54 MMHG | HEART RATE: 60 BPM | SYSTOLIC BLOOD PRESSURE: 141 MMHG

## 2021-07-13 VITALS — DIASTOLIC BLOOD PRESSURE: 45 MMHG | OXYGEN SATURATION: 99 % | SYSTOLIC BLOOD PRESSURE: 122 MMHG

## 2021-07-13 PROCEDURE — 2500000003 HC RX 250 WO HCPCS: Performed by: NURSE ANESTHETIST, CERTIFIED REGISTERED

## 2021-07-13 PROCEDURE — 7100000001 HC PACU RECOVERY - ADDTL 15 MIN: Performed by: UROLOGY

## 2021-07-13 PROCEDURE — 2720000010 HC SURG SUPPLY STERILE: Performed by: UROLOGY

## 2021-07-13 PROCEDURE — 6360000002 HC RX W HCPCS: Performed by: NURSE ANESTHETIST, CERTIFIED REGISTERED

## 2021-07-13 PROCEDURE — 6370000000 HC RX 637 (ALT 250 FOR IP): Performed by: UROLOGY

## 2021-07-13 PROCEDURE — 6360000002 HC RX W HCPCS: Performed by: UROLOGY

## 2021-07-13 PROCEDURE — 2580000003 HC RX 258: Performed by: UROLOGY

## 2021-07-13 PROCEDURE — 7100000000 HC PACU RECOVERY - FIRST 15 MIN: Performed by: UROLOGY

## 2021-07-13 PROCEDURE — 3700000001 HC ADD 15 MINUTES (ANESTHESIA): Performed by: UROLOGY

## 2021-07-13 PROCEDURE — 3700000000 HC ANESTHESIA ATTENDED CARE: Performed by: UROLOGY

## 2021-07-13 PROCEDURE — 7100000010 HC PHASE II RECOVERY - FIRST 15 MIN: Performed by: UROLOGY

## 2021-07-13 PROCEDURE — 2709999900 HC NON-CHARGEABLE SUPPLY: Performed by: UROLOGY

## 2021-07-13 PROCEDURE — 3600000004 HC SURGERY LEVEL 4 BASE: Performed by: UROLOGY

## 2021-07-13 PROCEDURE — 3600000014 HC SURGERY LEVEL 4 ADDTL 15MIN: Performed by: UROLOGY

## 2021-07-13 PROCEDURE — 7100000011 HC PHASE II RECOVERY - ADDTL 15 MIN: Performed by: UROLOGY

## 2021-07-13 RX ORDER — ONDANSETRON 2 MG/ML
4 INJECTION INTRAMUSCULAR; INTRAVENOUS
Status: DISCONTINUED | OUTPATIENT
Start: 2021-07-13 | End: 2021-07-13 | Stop reason: HOSPADM

## 2021-07-13 RX ORDER — LIDOCAINE AND PRILOCAINE 25; 25 MG/G; MG/G
CREAM TOPICAL ONCE
Status: COMPLETED | OUTPATIENT
Start: 2021-07-13 | End: 2021-07-13

## 2021-07-13 RX ORDER — SODIUM CHLORIDE 0.9 % (FLUSH) 0.9 %
5-40 SYRINGE (ML) INJECTION PRN
Status: DISCONTINUED | OUTPATIENT
Start: 2021-07-13 | End: 2021-07-13 | Stop reason: HOSPADM

## 2021-07-13 RX ORDER — LIDOCAINE HYDROCHLORIDE 20 MG/ML
INJECTION, SOLUTION EPIDURAL; INFILTRATION; INTRACAUDAL; PERINEURAL PRN
Status: DISCONTINUED | OUTPATIENT
Start: 2021-07-13 | End: 2021-07-13 | Stop reason: SDUPTHER

## 2021-07-13 RX ORDER — FENTANYL CITRATE 50 UG/ML
25 INJECTION, SOLUTION INTRAMUSCULAR; INTRAVENOUS EVERY 5 MIN PRN
Status: DISCONTINUED | OUTPATIENT
Start: 2021-07-13 | End: 2021-07-13 | Stop reason: HOSPADM

## 2021-07-13 RX ORDER — EPHEDRINE SULFATE/0.9% NACL/PF 50 MG/5 ML
SYRINGE (ML) INTRAVENOUS PRN
Status: DISCONTINUED | OUTPATIENT
Start: 2021-07-13 | End: 2021-07-13 | Stop reason: SDUPTHER

## 2021-07-13 RX ORDER — FENTANYL CITRATE 50 UG/ML
INJECTION, SOLUTION INTRAMUSCULAR; INTRAVENOUS PRN
Status: DISCONTINUED | OUTPATIENT
Start: 2021-07-13 | End: 2021-07-13 | Stop reason: SDUPTHER

## 2021-07-13 RX ORDER — SODIUM CHLORIDE 0.9 % (FLUSH) 0.9 %
5-40 SYRINGE (ML) INJECTION EVERY 12 HOURS SCHEDULED
Status: DISCONTINUED | OUTPATIENT
Start: 2021-07-13 | End: 2021-07-13 | Stop reason: HOSPADM

## 2021-07-13 RX ORDER — ACETAMINOPHEN 325 MG/1
650 TABLET ORAL
Status: COMPLETED | OUTPATIENT
Start: 2021-07-13 | End: 2021-07-13

## 2021-07-13 RX ORDER — LIDOCAINE HYDROCHLORIDE 20 MG/ML
JELLY TOPICAL PRN
Status: DISCONTINUED | OUTPATIENT
Start: 2021-07-13 | End: 2021-07-13 | Stop reason: ALTCHOICE

## 2021-07-13 RX ORDER — CIPROFLOXACIN 500 MG/1
500 TABLET, FILM COATED ORAL 2 TIMES DAILY
Qty: 14 TABLET | Refills: 0 | Status: SHIPPED | OUTPATIENT
Start: 2021-07-13 | End: 2021-07-20

## 2021-07-13 RX ORDER — PROPOFOL 10 MG/ML
INJECTION, EMULSION INTRAVENOUS PRN
Status: DISCONTINUED | OUTPATIENT
Start: 2021-07-13 | End: 2021-07-13 | Stop reason: SDUPTHER

## 2021-07-13 RX ORDER — SODIUM CHLORIDE 9 MG/ML
25 INJECTION, SOLUTION INTRAVENOUS PRN
Status: DISCONTINUED | OUTPATIENT
Start: 2021-07-13 | End: 2021-07-13 | Stop reason: HOSPADM

## 2021-07-13 RX ORDER — ONDANSETRON 2 MG/ML
INJECTION INTRAMUSCULAR; INTRAVENOUS PRN
Status: DISCONTINUED | OUTPATIENT
Start: 2021-07-13 | End: 2021-07-13 | Stop reason: SDUPTHER

## 2021-07-13 RX ORDER — DEXAMETHASONE SODIUM PHOSPHATE 4 MG/ML
INJECTION, SOLUTION INTRA-ARTICULAR; INTRALESIONAL; INTRAMUSCULAR; INTRAVENOUS; SOFT TISSUE PRN
Status: DISCONTINUED | OUTPATIENT
Start: 2021-07-13 | End: 2021-07-13 | Stop reason: SDUPTHER

## 2021-07-13 RX ORDER — FENTANYL CITRATE 50 UG/ML
50 INJECTION, SOLUTION INTRAMUSCULAR; INTRAVENOUS EVERY 5 MIN PRN
Status: DISCONTINUED | OUTPATIENT
Start: 2021-07-13 | End: 2021-07-13 | Stop reason: HOSPADM

## 2021-07-13 RX ORDER — CIPROFLOXACIN 2 MG/ML
400 INJECTION, SOLUTION INTRAVENOUS
Status: COMPLETED | OUTPATIENT
Start: 2021-07-13 | End: 2021-07-13

## 2021-07-13 RX ORDER — SODIUM CHLORIDE, SODIUM LACTATE, POTASSIUM CHLORIDE, CALCIUM CHLORIDE 600; 310; 30; 20 MG/100ML; MG/100ML; MG/100ML; MG/100ML
INJECTION, SOLUTION INTRAVENOUS CONTINUOUS
Status: DISCONTINUED | OUTPATIENT
Start: 2021-07-13 | End: 2021-07-13 | Stop reason: HOSPADM

## 2021-07-13 RX ORDER — PHENAZOPYRIDINE HYDROCHLORIDE 100 MG/1
200 TABLET, FILM COATED ORAL ONCE
Status: DISCONTINUED | OUTPATIENT
Start: 2021-07-13 | End: 2021-07-13 | Stop reason: HOSPADM

## 2021-07-13 RX ADMIN — CIPROFLOXACIN 400 MG: 2 INJECTION, SOLUTION INTRAVENOUS at 11:09

## 2021-07-13 RX ADMIN — DEXAMETHASONE SODIUM PHOSPHATE 4 MG: 4 INJECTION, SOLUTION INTRAMUSCULAR; INTRAVENOUS at 11:26

## 2021-07-13 RX ADMIN — LIDOCAINE AND PRILOCAINE: 25; 25 CREAM TOPICAL at 13:30

## 2021-07-13 RX ADMIN — ACETAMINOPHEN 650 MG: 325 TABLET ORAL at 10:29

## 2021-07-13 RX ADMIN — FENTANYL CITRATE 25 MCG: 50 INJECTION, SOLUTION INTRAMUSCULAR; INTRAVENOUS at 11:39

## 2021-07-13 RX ADMIN — PROPOFOL 100 MG: 10 INJECTION, EMULSION INTRAVENOUS at 11:18

## 2021-07-13 RX ADMIN — Medication 10 MG: at 11:33

## 2021-07-13 RX ADMIN — FENTANYL CITRATE 25 MCG: 50 INJECTION, SOLUTION INTRAMUSCULAR; INTRAVENOUS at 11:14

## 2021-07-13 RX ADMIN — Medication 15 MG: at 11:29

## 2021-07-13 RX ADMIN — LIDOCAINE HYDROCHLORIDE 2.5 ML: 20 INJECTION, SOLUTION EPIDURAL; INFILTRATION; INTRACAUDAL; PERINEURAL at 11:17

## 2021-07-13 RX ADMIN — ONDANSETRON 4 MG: 2 INJECTION INTRAMUSCULAR; INTRAVENOUS at 11:25

## 2021-07-13 RX ADMIN — SODIUM CHLORIDE, POTASSIUM CHLORIDE, SODIUM LACTATE AND CALCIUM CHLORIDE: 600; 310; 30; 20 INJECTION, SOLUTION INTRAVENOUS at 10:30

## 2021-07-13 ASSESSMENT — PAIN SCALES - GENERAL
PAINLEVEL_OUTOF10: 2
PAINLEVEL_OUTOF10: 0
PAINLEVEL_OUTOF10: 5
PAINLEVEL_OUTOF10: 0

## 2021-07-13 ASSESSMENT — PAIN DESCRIPTION - PAIN TYPE
TYPE: SURGICAL PAIN

## 2021-07-13 ASSESSMENT — PAIN DESCRIPTION - DESCRIPTORS
DESCRIPTORS: CONSTANT

## 2021-07-13 ASSESSMENT — PAIN DESCRIPTION - LOCATION
LOCATION: PENIS

## 2021-07-13 ASSESSMENT — PAIN - FUNCTIONAL ASSESSMENT: PAIN_FUNCTIONAL_ASSESSMENT: 0-10

## 2021-07-13 NOTE — ANESTHESIA PRE PROCEDURE
Department of Anesthesiology  Preprocedure Note       Name:  Yosvany Ann   Age:  80 y.o.  :  1932                                          MRN:  [id number]         Date:  2021      Surgeon: Danilo Moses):  Antonieta Burrell MD    Procedure: Procedure(s):  CYSTOSCOPY TRANSURETHRAL RESECTION PROSTATE LASER-PVP GREENLIGHT    Medications prior to admission:   Prior to Admission medications    Medication Sig Start Date End Date Taking? Authorizing Provider   tamsulosin (FLOMAX) 0.4 MG capsule Take 1 capsule by mouth 2 times daily 21  Yes Amrit Benz PA-C   atorvastatin (LIPITOR) 40 MG tablet Take 1 tablet by mouth nightly 17  Yes Alf Araujo MD   nitroGLYCERIN (NITROSTAT) 0.4 MG SL tablet Place 0.4 mg under the tongue every 5 minutes as needed for Chest pain up to max of 3 total doses. If no relief after 1 dose, call 911.   Patient not taking: Reported on 2021    Historical Provider, MD   clopidogrel (PLAVIX) 75 MG tablet Take 1 tablet by mouth daily 17   Alf Araujo MD       Current medications:    Current Facility-Administered Medications   Medication Dose Route Frequency Provider Last Rate Last Admin    sodium chloride flush 0.9 % injection 5-40 mL  5-40 mL Intravenous 2 times per day Antonieta Burrell MD        sodium chloride flush 0.9 % injection 5-40 mL  5-40 mL Intravenous PRN Antonieta Burrell MD        0.9 % sodium chloride infusion  25 mL Intravenous PRN Antonieta Burrell MD        lactated ringers infusion   Intravenous Continuous Antonieta Burrell  mL/hr at 21 1030 New Bag at 21 1030    sodium chloride flush 0.9 % injection 5-40 mL  5-40 mL Intravenous 2 times per day Antonieta Burrell MD        sodium chloride flush 0.9 % injection 5-40 mL  5-40 mL Intravenous PRN Antonieta Burrell MD        0.9 % sodium chloride infusion  25 mL Intravenous PRN Antonieta Burrell MD        ciprofloxacin (CIPRO) IVPB 400 mg  400 mg Intravenous On Call to Emily Interiano MD           Allergies:  No Known Allergies    Problem List:    Patient Active Problem List   Diagnosis Code    Right-sided muscle weakness M62.81    Hypertension I10    Vitamin D deficiency E55.9    Acute ischemic lacunar infarct  I63.9    Right sided weakness R53.1    BPH with obstruction/lower urinary tract symptoms N40.1, N13.8       Past Medical History:        Diagnosis Date    Cataract     GERD (gastroesophageal reflux disease)     Hearing loss     Hyperlipidemia     Osteoarthritis     Stroke ([facility] [address] 2017    Ulcer     in past     Uses hearing aid        Past Surgical History:        Procedure Laterality Date    ARM SURGERY      left arm rebuild     EYE SURGERY      , cataract    SHOULDER SURGERY      right       Social History:    Social History     Tobacco Use    Smoking status: Former Smoker    Smokeless tobacco: Never Used    Tobacco comment: when he was young [age]y/o   Substance Use Topics    Alcohol use: Not Currently     Comment: very rarely                                Counseling given: Not Answered  Comment: when he was young [age]y/o      Vital Signs (Current):   Vitals:    06/29/21 1503 07/13/21 1007 07/13/21 1019 07/13/21 1032   BP:   (!) 176/68 136/72   Pulse:   60    Resp:   13    Temp:   36.1 °C (96.9 °F)    TempSrc:   Temporal    SpO2:   98%    Weight: 175 lb (79.4 kg) 175 lb 12.8 oz (79.7 kg)     Height: 6' 2\" (1.88 m)  6' 4\" (1.93 m)                                               BP Readings from Last 3 Encounters:   07/13/21 136/72   06/28/21 90/60   06/10/21 124/80       NPO Status: Time of last liquid consumption: 1800                        Time of last solid consumption: 1300                        Date of last liquid consumption: 07/12/21                        Date of last solid food consumption: 07/12/21    BMI:   Wt Readings from Last 3 Encounters:   07/13/21 175 lb 12.8 oz (79.7 kg)   06/10/21 172 lb (78 kg)   06/02/21 174 lb (78.9 kg)     Body mass index is 21.4 kg/m². CBC:   Lab Results   Component Value Date    WBC 5.4 05/24/2021    RBC 3.74 05/24/2021    HGB 11.2 05/24/2021    HCT 34.9 05/24/2021    MCV 93.3 05/24/2021    RDW 13.0 05/24/2021     05/24/2021       CMP:   Lab Results   Component Value Date     05/24/2021    K 4.3 05/24/2021     05/24/2021    CO2 23 05/24/2021    BUN 22 05/24/2021    CREATININE 0.83 05/24/2021    GFRAA >60 05/24/2021    LABGLOM >60 05/24/2021    GLUCOSE 92 05/24/2021    PROT 6.3 05/24/2021    CALCIUM 8.4 05/24/2021    BILITOT 0.47 05/24/2021    ALKPHOS 91 05/24/2021    AST 16 05/24/2021    ALT 29 05/24/2021       POC Tests: No results for input(s): POCGLU, POCNA, POCK, POCCL, POCBUN, POCHEMO, POCHCT in the last 72 hours. Coags:   Lab Results   Component Value Date    PROTIME 11.7 05/30/2017    INR 1.1 05/30/2017       HCG (If Applicable): No results found for: PREGTESTUR, PREGSERUM, HCG, HCGQUANT     ABGs: No results found for: PHART, PO2ART, UUY2PUA, BUC7YDH, BEART, G0CUFYXA     Type & Screen (If Applicable):  No results found for: LABABO, LABRH    Drug/Infectious Status (If Applicable):  No results found for: HIV, HEPCAB    COVID-19 Screening (If Applicable):   Lab Results   Component Value Date    COVID19 Not Detected 05/07/2021           Anesthesia Evaluation    Airway: Mallampati: II        Dental: normal exam         Pulmonary:Negative Pulmonary ROS and normal exam                               Cardiovascular:    (+) hypertension:,                   Neuro/Psych:   (+) CVA (right sided weakness): residual symptoms,             GI/Hepatic/Renal:   (+) GERD: well controlled,           Endo/Other: Negative Endo/Other ROS                    Abdominal:             Vascular: negative vascular ROS. Other Findings:           Anesthesia Plan      general     ASA 3       Induction: intravenous. Anesthetic plan and risks discussed with patient.               hearing aids: patient significant deficit without.          ISAURA Antunez - MIGUEL   7/13/2021

## 2021-07-13 NOTE — BRIEF OP NOTE
Brief Postoperative Note      Patient: Masood Gramajo  YOB: 1932  MRN: [id number]    Date of Procedure: 7/13/2021    Pre-Op Diagnosis: BPH    Post-Op Diagnosis: Same       Procedure(s):  CYSTOSCOPY TRANSURETHRAL RESECTION PROSTATE LASER-PVP GREENLIGHT    Surgeon(s):  Mitzy Canales MD    Assistant:  * No surgical staff found *    Anesthesia: General    Estimated Blood Loss (mL): Minimal    Complications: None    Specimens:   * No specimens in log *    Implants:  * No implants in log *      Drains:   Urethral Catheter Non-latex 16 fr (Active)       Findings: bilobar hyperplasia    Electronically signed by Mitzy Canales MD on 7/13/2021 at 12:08 PM

## 2021-07-13 NOTE — PROGRESS NOTES
DC Instructions given to patient and family. Both verbalized understanding. Patient expressed readiness to be discharged. Discharge Criteria    Inpatients must meet Criteria 1 through 7. All other patients are either YES or N/A. If a NO is chosen then Anesthesia or Surgeon must be notified. 1.  Minimum 30 minutes after last dose of sedative medication, minimum 120 minutes after last dose of reversal agent. Yes      2. Systolic BP stable within 20 mmHg for 30 minutes & systolic BP between 90 & 360 or within 10 mmHg of baseline. Yes      3. Pulse between 60 and 100 or within 10 bpm of baseline. Yes      4. Spontaneous respiratory rate >/= 10 per minute. Yes      5. SaO2 >/= 95 or  >/= baseline. Yes      6. Able to cough and swallow or return to baseline function. Yes      7. Alert and oriented or return to baseline mental status. Yes      8. Demonstrates controlled, coordinated movements, ambulates with steady gait, or return to baseline activity function. Yes      9. Minimal or no pain or nausea, or at a level tolerable and acceptable to patient. Yes      10. Takes and retains oral fluids as allowed. Yes      11. Procedural / perioperative site stable. Minimal or no bleeding. Yes          12. If GI endoscopy procedure, minimal or no abdominal distention or passing flatus. N/A      13. Written discharge instructions and emergency telephone number provided. Yes      14. Accompanied by a responsible adult.     Yes

## 2021-07-14 NOTE — OP NOTE
[address]                                OPERATIVE REPORT    PATIENT NAME: Ike Mensah                      :        1932  MED REC NO:   [id number]                              ROOM:  ACCOUNT NO:   [de-identified]                           ADMIT DATE: 2021  PROVIDER:     Jihan Vargas    DATE OF PROCEDURE:  2021    SURGEON:  Dr. Jihan Vargas. ASSISTANT:  None. PREOPERATIVE DIAGNOSES:  1. BPH with lower urinary tract symptoms. 2.  Urinary retention. 3.  Urinary weak stream.  4.  Urinary urgency. POSTOPERATIVE DIAGNOSES:  1. BPH with lower urinary tract symptoms. 2.  Urinary retention. 3.  Urinary weak stream.  4.  Urinary urgency. PROCEDURE PERFORMED:  Photoselective vaporization of the prostate with  GreenLight laser XPS. ANESTHESIA:  General.    COMPLICATIONS:  None. ESTIMATED BLOOD LOSS:  Minimal.    SPECIMENS:  A 22-Slovenian three-way Kapoor catheter. DISPOSITION:  Stable. FINDINGS:  Trilobar hyperplasia of the prostate. INDICATIONS:  The patient is an 63-year-old male with difficulty  voiding, here now for definitive therapy after having a Kapoor catheter  in place. DESCRIPTION OF PROCEDURE:  The patient was taken back to the operating  room after informed consent including all risks, benefits, and  alternatives were obtained. The patient was transferred from the [facility]  onto the operative table, where he was induced under general anesthesia,  and given IV Cipro for preoperative antibiotic prophylaxis. To begin  the case, he was prepped and draped in the normal sterile fashion, and  placed in dorsal lithotomy. He had a 24-Slovenian sheath with a 30-degree  lens passed through the urethra into the bladder. Once in the bladder,  we identified the ureteral orifice far away from the bladder neck.   We  then inserted the XPS laser fiber in and began our _____ at the bladder  neck. We used power level of 80 and increased our power level to 160. Care and precaution were taken not to resect past the verumontanum. We  did use a total of 187,000 joules of energy. At the end, we did turn  off the inflow and hemostasis was achieved. We removed the scope,  inserted a 22-Canadian Kapoor catheter, and hand irrigated to clear. He  was then awoken from general anesthesia, transferred to the [facility], and  taken to the PACU in satisfactory condition by Nursing and Anesthesia  Teams.     PLAN:  The patient will be discharged home per PACU criterion and follow  up with us in one week for Kapoor catheter removal.        Faustino Heaton    D: 07/13/2021 14:32:26       T: 07/13/2021 22:35:02     [email or web address]  Job#: [id number]     Doc#: [id number]    CC:

## 2021-07-21 ENCOUNTER — OFFICE VISIT (OUTPATIENT)
Dept: UROLOGY | Age: 86
End: 2021-07-21
Payer: MEDICARE

## 2021-07-21 ENCOUNTER — TELEPHONE (OUTPATIENT)
Dept: UROLOGY | Age: 86
End: 2021-07-21

## 2021-07-21 VITALS
DIASTOLIC BLOOD PRESSURE: 67 MMHG | HEART RATE: 86 BPM | WEIGHT: 175 LBS | SYSTOLIC BLOOD PRESSURE: 106 MMHG | BODY MASS INDEX: 21.3 KG/M2

## 2021-07-21 DIAGNOSIS — N40.1 BPH WITH OBSTRUCTION/LOWER URINARY TRACT SYMPTOMS: Primary | ICD-10-CM

## 2021-07-21 DIAGNOSIS — N13.8 BPH WITH OBSTRUCTION/LOWER URINARY TRACT SYMPTOMS: Primary | ICD-10-CM

## 2021-07-21 DIAGNOSIS — R33.9 RETENTION OF URINE: ICD-10-CM

## 2021-07-21 PROCEDURE — 99024 POSTOP FOLLOW-UP VISIT: CPT | Performed by: PHYSICIAN ASSISTANT

## 2021-07-21 RX ORDER — POLYETHYLENE GLYCOL 3350 17 G/17G
17 POWDER, FOR SOLUTION ORAL DAILY
COMMUNITY

## 2021-07-21 NOTE — PROGRESS NOTES
Pt had rasmussen catheter placed on 07/13/2021 following pvp greenlight. Balloon deflated on catheter and catheter removed. Patient tolerated procedure well. Pt instructed to drink plenty of fluids, try to urinate q 2 hrs, call office in 6 hrs with update of amount voided, and if not voiding will need to return to office to have rasmussen replaced. Also instructed to return to office or ER if goes >6 hrs without voiding or has strong urge to void/suprapubic discomfort and cannot urinate. Pt verbalizes understanding of plan. F/u 2 weeks.

## 2021-07-21 NOTE — PATIENT INSTRUCTIONS
· Drink plenty of fluids, aim for 80 oz daily for the next few days. · It is normal to feel a little discomfort the next few times you void. · Try to urinate every 2-3 hours while awake (even if you don't feel like you have to void). · If you DO urinate, please record the amount. · If you do NOT urinate within about 6 hours OR if you feel the strong uncomfortable urge to void but are not able - you'll need to come back to the office to get the catheter replaced. · Either way, call our office around 3:30 pm and let us know the void amounts (if you are urinating) or let us know that you're on your way back to the office (if you are not urinating). SURVEY:    You may be receiving a survey from [employer] regarding your visit today. Please complete the survey to enable us to provide the highest quality of care to you and your family. If you cannot score us a very good on any question, please call the office to discuss how we could have made your experience a very good one. Thank you.

## 2021-07-21 NOTE — TELEPHONE ENCOUNTER
Patient's caregiver called to report patient is voiding well. Denies patient having any difficulty or complaints. Caregiver advised to call the office or take the patient to the ER if unable to void after 6 hours, fever/chills, n/v. Caregiver voiced understanding.

## 2021-08-04 ENCOUNTER — OFFICE VISIT (OUTPATIENT)
Dept: UROLOGY | Age: 86
End: 2021-08-04
Payer: MEDICARE

## 2021-08-04 ENCOUNTER — HOSPITAL ENCOUNTER (OUTPATIENT)
Dept: GENERAL RADIOLOGY | Age: 86
Discharge: HOME | End: 2021-08-06
Payer: MEDICARE

## 2021-08-04 ENCOUNTER — HOSPITAL ENCOUNTER (OUTPATIENT)
Age: 86
Discharge: HOME | End: 2021-08-06
Payer: MEDICARE

## 2021-08-04 VITALS
DIASTOLIC BLOOD PRESSURE: 78 MMHG | HEIGHT: 76 IN | SYSTOLIC BLOOD PRESSURE: 122 MMHG | BODY MASS INDEX: 20.9 KG/M2 | WEIGHT: 171.6 LBS

## 2021-08-04 DIAGNOSIS — R06.00 DYSPNEA, UNSPECIFIED TYPE: ICD-10-CM

## 2021-08-04 DIAGNOSIS — N13.8 BPH WITH OBSTRUCTION/LOWER URINARY TRACT SYMPTOMS: ICD-10-CM

## 2021-08-04 DIAGNOSIS — N40.1 BPH WITH OBSTRUCTION/LOWER URINARY TRACT SYMPTOMS: ICD-10-CM

## 2021-08-04 DIAGNOSIS — R33.9 RETENTION OF URINE: Primary | ICD-10-CM

## 2021-08-04 PROCEDURE — 99211 OFF/OP EST MAY X REQ PHY/QHP: CPT | Performed by: PHYSICIAN ASSISTANT

## 2021-08-04 PROCEDURE — 71046 X-RAY EXAM CHEST 2 VIEWS: CPT

## 2021-08-04 PROCEDURE — 51798 US URINE CAPACITY MEASURE: CPT | Performed by: PHYSICIAN ASSISTANT

## 2021-08-04 PROCEDURE — 99024 POSTOP FOLLOW-UP VISIT: CPT | Performed by: PHYSICIAN ASSISTANT

## 2021-08-04 ASSESSMENT — ENCOUNTER SYMPTOMS
CONSTIPATION: 0
EYE REDNESS: 0
BACK PAIN: 0
SHORTNESS OF BREATH: 0
COUGH: 0
COLOR CHANGE: 0
NAUSEA: 0
WHEEZING: 0
VOMITING: 0
ABDOMINAL PAIN: 0

## 2021-08-04 NOTE — PROGRESS NOTES
HPI:      Patient is a 80 y.o. male in no acute distress. He is alert and oriented to person, place, and time. History  5/2021 referral for urinary retention and acute kidney injury. Kapoor catheter was placed for 1400 mL. Creatinine was 5.68. Patient denies having any urinary symptoms prior to developing urinary retention. Started on flomax     6/2/2021 Kapoor removed, then replaced due to the inability to urinate and suprapubic pain. Only having a bowel movement every other day              Increase Flomax to twice per day                 Increase MiraLAX to twice per day    7/2021 -PVP greenlight    Today:  Patient is here today approximately 2-week status post PVP greenlight. He continues take Flomax twice a day. He did have his Kapoor catheter removed last visit. He overall is doing well. He does continue to have intermittent hematuria. He did pass a large clot approximately week ago but none since. He is not having any further nocturia.  Patient voided - 40 mL, PVR - 0 mL    Past Medical History:   Diagnosis Date    Cataract     GERD (gastroesophageal reflux disease)     Hearing loss     Hyperlipidemia     Osteoarthritis     Stroke ([facility] [address] 2017    Ulcer     in past     Uses hearing aid      Past Surgical History:   Procedure Laterality Date    ARM SURGERY      left arm rebuild     EYE SURGERY      , cataract    SHOULDER SURGERY      right    TURP  07/13/2021    TURP N/A 7/13/2021    CYSTOSCOPY TRANSURETHRAL RESECTION PROSTATE LASER-PVP GREENLIGHT performed by Meek Restrepo MD at [facility] Encounter Medications as of 8/4/2021   Medication Sig Dispense Refill    polyethylene glycol (MIRALAX) 17 g PACK packet Take 17 g by mouth daily      tamsulosin (FLOMAX) 0.4 MG capsule Take 1 capsule by mouth 2 times daily 60 capsule 11    atorvastatin (LIPITOR) 40 MG tablet Take 1 tablet by mouth nightly 90 tablet 3    clopidogrel (PLAVIX) 75 MG tablet Take 1 tablet Genitourinary: Negative for decreased urine volume, difficulty urinating, discharge, dysuria, enuresis, flank pain, frequency, hematuria, penile pain, scrotal swelling, testicular pain and urgency. Musculoskeletal: Negative for back pain, joint swelling and myalgias. Skin: Negative for color change, rash and wound. Neurological: Negative for dizziness, tremors and numbness. Hematological: Negative for adenopathy. Does not bruise/bleed easily. /78 (Site: Left Upper Arm, Position: Sitting, Cuff Size: Large Adult)   Ht 6' 4\" (1.93 m)   Wt 171 lb 9.6 oz (77.8 kg)   BMI 20.89 kg/m²       PHYSICAL EXAM:  Constitutional: Patient in no acute distress; Neuro: alert and oriented to person place and time. Psych: Mood and affect normal.  Lungs: Respiratory effort normal  Cardiovascular:  Normal peripheral pulses  Abdomen: Soft, non-tender, non-distended  Rectal: Deferred      Lab Results   Component Value Date    BUN 22 05/24/2021     Lab Results   Component Value Date    CREATININE 0.83 05/24/2021     No results found for: PSA    ASSESSMENT:   Diagnosis Orders   1. Retention of urine  MT MEASUREMENT,POST-VOID RESIDUAL VOLUME BY US,NON-IMAGING   2. BPH with obstruction/lower urinary tract symptoms  MT MEASUREMENT,POST-VOID RESIDUAL VOLUME BY US,NON-IMAGING         PLAN:  Continue Flomax twice a day    Continue MiraLAX twice a day    Follow-up in 6 weeks with PVR    Reassured them that some bleeding and passing tissue after this procedure is to be expected. We did let them know that it does take upwards of 3 months for a patient to heal from this procedure. If patient develops any new or worsening lower urinary tract symptoms or if he is unable to urinate he needs to contact our office versus going to the emergency room.

## 2021-10-25 ENCOUNTER — OFFICE VISIT (OUTPATIENT)
Dept: UROLOGY | Age: 86
End: 2021-10-25
Payer: MEDICARE

## 2021-10-25 VITALS
DIASTOLIC BLOOD PRESSURE: 62 MMHG | OXYGEN SATURATION: 98 % | TEMPERATURE: 97.7 F | SYSTOLIC BLOOD PRESSURE: 128 MMHG | BODY MASS INDEX: 20.7 KG/M2 | HEIGHT: 76 IN | WEIGHT: 170 LBS | HEART RATE: 73 BPM

## 2021-10-25 DIAGNOSIS — N13.8 BPH WITH OBSTRUCTION/LOWER URINARY TRACT SYMPTOMS: Primary | ICD-10-CM

## 2021-10-25 DIAGNOSIS — N40.1 BPH WITH OBSTRUCTION/LOWER URINARY TRACT SYMPTOMS: Primary | ICD-10-CM

## 2021-10-25 DIAGNOSIS — R33.9 RETENTION OF URINE: ICD-10-CM

## 2021-10-25 PROCEDURE — G8427 DOCREV CUR MEDS BY ELIG CLIN: HCPCS | Performed by: UROLOGY

## 2021-10-25 PROCEDURE — 4040F PNEUMOC VAC/ADMIN/RCVD: CPT | Performed by: UROLOGY

## 2021-10-25 PROCEDURE — 1036F TOBACCO NON-USER: CPT | Performed by: UROLOGY

## 2021-10-25 PROCEDURE — 99211 OFF/OP EST MAY X REQ PHY/QHP: CPT

## 2021-10-25 PROCEDURE — 99213 OFFICE O/P EST LOW 20 MIN: CPT | Performed by: UROLOGY

## 2021-10-25 PROCEDURE — 1123F ACP DISCUSS/DSCN MKR DOCD: CPT | Performed by: UROLOGY

## 2021-10-25 PROCEDURE — G8484 FLU IMMUNIZE NO ADMIN: HCPCS | Performed by: UROLOGY

## 2021-10-25 PROCEDURE — G8420 CALC BMI NORM PARAMETERS: HCPCS | Performed by: UROLOGY

## 2021-10-25 ASSESSMENT — ENCOUNTER SYMPTOMS
NAUSEA: 0
EYE PAIN: 0
WHEEZING: 0
ABDOMINAL PAIN: 0
COUGH: 0
EYE REDNESS: 0
SHORTNESS OF BREATH: 0
BACK PAIN: 0
VOMITING: 0
COLOR CHANGE: 0

## 2021-10-25 NOTE — PROGRESS NOTES
HPI:          Patient is a 80 y.o. male in no acute distress. He is alert and oriented to person, place, and time. History  5/2021 referral for urinary retention and acute kidney injury.  Kapoor catheter was placed for 1400 mL.  Creatinine was 5.68.  Patient denies having any urinary symptoms prior to developing urinary retention.                Started on flomax     6/2/2021 Kapoor removed, then replaced due to the inability to urinate and suprapubic pain.  Only having a bowel movement every other day              Increase Flomax to twice per day                 Increase MiraLAX to twice per day    7/13/2021 PVP Greenlight        Currently  Patient is here today for 3-month follow-up. This is approximately 3 months. Patient was to continue his Flomax twice daily after the last visit. Patient is doing well with his urination. He is complaining about his foreskin tightening around the glans penis. I do think this is from all the irritation from catheters being placed in and out. He is able to pull this over the glans penis but is somewhat difficult. Patient does not have any pain today. Patient does have recurrent gross hematuria dysuria. No flank pain nausea or vomiting today.   Past Medical History:   Diagnosis Date    Cataract     GERD (gastroesophageal reflux disease)     Hearing loss     Hyperlipidemia     Osteoarthritis     Stroke ([facility] [address] 2017    Ulcer     in past     Uses hearing aid      Past Surgical History:   Procedure Laterality Date    ARM SURGERY      left arm rebuild     EYE SURGERY      , cataract    SHOULDER SURGERY      right    TURP  07/13/2021    TURP N/A 7/13/2021    CYSTOSCOPY TRANSURETHRAL RESECTION PROSTATE LASER-PVP GREENLIGHT performed by Jose L Rob MD at [address] Encounter Medications as of 10/25/2021   Medication Sig Dispense Refill    polyethylene glycol (MIRALAX) 17 g PACK packet Take 17 g by mouth daily      tamsulosin (FLOMAX) 0.4 MG capsule Take 1 capsule by mouth 2 times daily 60 capsule 11    nitroGLYCERIN (NITROSTAT) 0.4 MG SL tablet Place 0.4 mg under the tongue every 5 minutes as needed for Chest pain up to max of 3 total doses. If no relief after 1 dose, call 911.  atorvastatin (LIPITOR) 40 MG tablet Take 1 tablet by mouth nightly 90 tablet 3    clopidogrel (PLAVIX) 75 MG tablet Take 1 tablet by mouth daily 90 tablet 3     No facility-administered encounter medications on file as of 10/25/2021. Current Outpatient Medications on File Prior to Visit   Medication Sig Dispense Refill    polyethylene glycol (MIRALAX) 17 g PACK packet Take 17 g by mouth daily      tamsulosin (FLOMAX) 0.4 MG capsule Take 1 capsule by mouth 2 times daily 60 capsule 11    nitroGLYCERIN (NITROSTAT) 0.4 MG SL tablet Place 0.4 mg under the tongue every 5 minutes as needed for Chest pain up to max of 3 total doses. If no relief after 1 dose, call 911.  atorvastatin (LIPITOR) 40 MG tablet Take 1 tablet by mouth nightly 90 tablet 3    clopidogrel (PLAVIX) 75 MG tablet Take 1 tablet by mouth daily 90 tablet 3     No current facility-administered medications on file prior to visit. Patient has no known allergies. Family History   Problem Relation Age of Onset    Cancer Mother         bladder    Heart Disease Father     High Blood Pressure Brother     Stroke Brother     High Blood Pressure Brother     Stroke Brother      Social History     Tobacco Use   Smoking Status Former Smoker    Types: Cigarettes   Smokeless Tobacco Never Used   Tobacco Comment    when he was young [age]y/o       Social History     Substance and Sexual Activity   Alcohol Use Not Currently    Comment: very rarely       Review of Systems   Constitutional: Negative for appetite change, chills and fever. Eyes: Negative for pain, redness and visual disturbance. Respiratory: Negative for cough, shortness of breath and wheezing.     Cardiovascular: Negative for chest pain and leg swelling. Gastrointestinal: Negative for abdominal pain, nausea and vomiting. Genitourinary: Negative for difficulty urinating, discharge, dysuria, flank pain, frequency, hematuria, scrotal swelling and testicular pain. Musculoskeletal: Negative for back pain, joint swelling and myalgias. Skin: Negative for color change, rash and wound. Neurological: Negative for dizziness, tremors and numbness. Hematological: Negative for adenopathy. Does not bruise/bleed easily. /62 (Site: Right Upper Arm, Position: Sitting, Cuff Size: Medium Adult)   Pulse 73   Temp 97.7 °F (36.5 °C) (Infrared)   Ht 6' 4\" (1.93 m)   Wt 170 lb (77.1 kg)   SpO2 98%   BMI 20.69 kg/m²       PHYSICAL EXAM:  Constitutional: Patient in no acute distress; Neuro: alert and oriented to person place and time. Psych: Mood and affect normal.  Skin: Normal  Lungs: Respiratory effort normal  Cardiovascular:  Normal peripheral pulses  Abdomen: Soft, non-tender, non-distended with no CVA, flank pain  Bladder non-tender and not distended. Lymphatics: no palpable lymphadenopathy  Penis normal  Urethral meatus normal  Scrotal exam normal  Testicles normal bilaterally  Epididymis normal bilaterally  No evidence of inguinal hernia      Lab Results   Component Value Date    BUN 22 05/24/2021     Lab Results   Component Value Date    CREATININE 0.83 05/24/2021     No results found for: PSA    ASSESSMENT:  This is a 80 y.o. male with the following diagnoses:   Diagnosis Orders   1. BPH with obstruction/lower urinary tract symptoms     2.  Retention of urine         PLAN:  We will see him back in 6 months with a repeat postvoid residual.

## 2021-10-25 NOTE — PATIENT INSTRUCTIONS
SURVEY:    You may be receiving a survey from [employer] regarding your visit today. Please complete the survey to enable us to provide the highest quality of care to you and your family. If you cannot score us a very good on any question, please call the office to discuss how we could have made your experience a very good one. Thank you.

## 2021-11-02 ENCOUNTER — HOSPITAL ENCOUNTER (OUTPATIENT)
Dept: PHYSICAL THERAPY | Age: 86
Setting detail: THERAPIES SERIES
Discharge: HOME | End: 2021-11-02
Payer: MEDICARE

## 2021-11-02 PROCEDURE — 97162 PT EVAL MOD COMPLEX 30 MIN: CPT

## 2021-11-02 NOTE — PROGRESS NOTES
Phone: [address]          Fax: [phone]                      Outpatient Physical Therapy                                                                      Evaluation  Date: 2021  Patient: Gerri Connors  : 1932  [facility] #: [id number]  Referring Practitioner: Elisha Dobbs DO    Referral Date : 10/19/21     Medical Diagnosis: Leg weakness    Treatment Diagnosis: generalized weakness, difficulty walking  Onset Date: 10/19/21  PT Insurance Information: Medicare  Total # of Visits Approved: 12   Total # of Visits to Date: 1  No Show: 0  Canceled Appointment: 0     Subjective  Subjective: Pt arrives with caregiver, who assists with providing history. Pt's caregiver states that pt only takes a few steps at home before losing balance. Pt states his legs fatigue quickly. Pt denies any falls at home. Pt's caregiver states that pt uses a cane/walking stick for balance, but is only able to take 4-5 steps at a time before needing to sit down from fatigue and balance deficits. Pt uses W/C for majority of ambulation. Pt states he gets winded easily. Comments: Per order: Evaluate for benefits and safety of water therapy  Additional Pertinent Hx: Pt states he becomes winded very easily; L elbow does not extend past 90*; CVA, hearing problems  Pain Screening  Patient Currently in Pain: Denies          Objective     Observation/Palpation  Observation: Pt arrives ambulating in W/C. Pt able to ambulate short distances with walking stick, but demonstrates bent knee gait. Pt fatigues quickly and requires frequent rest breaks throughout evaluation.     LUE General AROM: L elbow flex WFL, ext lacking 90*          Strength RLE  Comment: Hip 4-/5, knee flex 4/5, ext 4-/5, PF/DF 3+ to 4-/5  Strength LLE  Comment: Hip 4-/5, knee flex 4/5, ext 4-/5, PF/DF 3+ to 4-/5    Additional Measures  Special Tests: TUG with walking stick= 17 seconds  Other: Static balance: feet apart >/=30 sec; feet together, slight increase in sway but able to maintain for >/= 30sec; pt unable to maintain tandem stance. Exercises:  Exercise 1: HEP: sink ex-- without squats  Exercise 2: >> aquatic therapy program for LE strengthening and endurance      Functional Outcome Measures     Timed Up and Go: 17       Assessment  Assessment: Pt is a 79 y/o male who presents to PT with generalized weakness and decreased activity tolerance. Pt currently requires use of manual W/C for majority of ambulation, but is able to walk short distances with walking stick. Pt demonstrates bent knee gait with ambulation and requires frequent seated rest breaks throughout evaluation d/t fatigue and limited endurance. Pt demonstrates decreased BLE strength with MMT, which was tested as follows: Hip 4-/5, knee flex 4/5, ext 4-/5, PF/DF 3+ to 4-/5. Pt performs TUG with walking stick in 17 seconds. Pt will benefit from skilled PT services to address the above impairments and to work toward established functional goals. Pt and caregiver expressing interest in aquatic therapy program to improve pt tolerance for exercise program.  Prognosis: Good        Decision Making: Medium Complexity    Patient Education  Patient Education: PT POC and HEP. Also educated on benefits of aquatic therapy program  Pt verbalized/demonstrated good understanding:     [X] Yes         [] No, pt required further clarification. Goals  Short term goals  Time Frame for Short term goals: 3 weeks  Short term goal 1: Pt will initiate HEP. Short term goal 2: Pt will initiate aquatic therapy program to assist with improving LE strengthening and endurance. Long term goals  Time Frame for Long term goals : 6 weeks  Long term goal 1: Pt will be independent and compliant with HEP. Long term goal 2: Pt will safely perform TUG with LRAD in </= 13 seconds to decrease risk of falls.   Long term goal 3: Pt will demonstrate ability to maintain tandem stance for >/= 10 seconds to improve balance with NBOS. Long term goal 4: Pt will tolerate >/= 40-45mins ther ex/act with minimal rest breaks to improve endurance and activity tolerance. Long term goal 5: Pt will safely ambulate household distances with LRAD to improve functional ambulation throughout house. Patient goals :  \"To walk farther and not use chair as much; use cane\"        Minutes Tracking:  Time In: 1540  Time Out: 1620  Minutes: 40  Timed Code Treatment Minutes: [address], [address], DPT    11/2/2021

## 2021-11-03 ENCOUNTER — APPOINTMENT (OUTPATIENT)
Dept: PHYSICAL THERAPY | Age: 86
End: 2021-11-03
Payer: MEDICARE

## 2021-11-05 ENCOUNTER — APPOINTMENT (OUTPATIENT)
Dept: PHYSICAL THERAPY | Age: 86
End: 2021-11-05
Payer: MEDICARE

## 2021-11-07 ENCOUNTER — APPOINTMENT (OUTPATIENT)
Dept: GENERAL RADIOLOGY | Age: 86
End: 2021-11-07
Payer: MEDICARE

## 2021-11-07 ENCOUNTER — ANESTHESIA EVENT (OUTPATIENT)
Dept: OPERATING ROOM | Age: 86
End: 2021-11-07
Payer: MEDICARE

## 2021-11-07 ENCOUNTER — ANESTHESIA EVENT (OUTPATIENT)
Dept: OPERATING ROOM | Age: 86
End: 2021-11-07

## 2021-11-07 ENCOUNTER — HOSPITAL ENCOUNTER (OUTPATIENT)
Age: 86
Discharge: HOME | End: 2021-11-07
Attending: EMERGENCY MEDICINE
Payer: MEDICARE

## 2021-11-07 ENCOUNTER — ANESTHESIA (OUTPATIENT)
Dept: OPERATING ROOM | Age: 86
End: 2021-11-07

## 2021-11-07 ENCOUNTER — ANESTHESIA (OUTPATIENT)
Dept: OPERATING ROOM | Age: 86
End: 2021-11-07
Payer: MEDICARE

## 2021-11-07 VITALS
WEIGHT: 170 LBS | DIASTOLIC BLOOD PRESSURE: 51 MMHG | HEIGHT: 76 IN | SYSTOLIC BLOOD PRESSURE: 148 MMHG | HEART RATE: 56 BPM | RESPIRATION RATE: 18 BRPM | OXYGEN SATURATION: 100 % | BODY MASS INDEX: 20.7 KG/M2 | TEMPERATURE: 96.9 F

## 2021-11-07 VITALS — OXYGEN SATURATION: 100 % | SYSTOLIC BLOOD PRESSURE: 165 MMHG | DIASTOLIC BLOOD PRESSURE: 74 MMHG

## 2021-11-07 DIAGNOSIS — T18.108A FOREIGN BODY IN ESOPHAGUS, INITIAL ENCOUNTER: Primary | ICD-10-CM

## 2021-11-07 LAB
ABSOLUTE EOS #: 0.15 K/UL (ref 0–0.44)
ABSOLUTE IMMATURE GRANULOCYTE: <0.03 K/UL (ref 0–0.3)
ABSOLUTE LYMPH #: 1.1 K/UL (ref 1.1–3.7)
ABSOLUTE MONO #: 0.48 K/UL (ref 0.1–1.2)
ALBUMIN SERPL-MCNC: 4.1 G/DL (ref 3.5–5.2)
ALBUMIN/GLOBULIN RATIO: 1.4 (ref 1–2.5)
ALP BLD-CCNC: 122 U/L (ref 40–129)
ALT SERPL-CCNC: 15 U/L (ref 5–41)
ANION GAP SERPL CALCULATED.3IONS-SCNC: 11 MMOL/L (ref 9–17)
AST SERPL-CCNC: 17 U/L
BASOPHILS # BLD: 1 % (ref 0–2)
BASOPHILS ABSOLUTE: 0.03 K/UL (ref 0–0.2)
BILIRUB SERPL-MCNC: 1.17 MG/DL (ref 0.3–1.2)
BUN BLDV-MCNC: 17 MG/DL (ref 8–23)
BUN/CREAT BLD: 19 (ref 9–20)
CALCIUM SERPL-MCNC: 9.4 MG/DL (ref 8.6–10.4)
CHLORIDE BLD-SCNC: 105 MMOL/L (ref 98–107)
CO2: 24 MMOL/L (ref 20–31)
CREAT SERPL-MCNC: 0.88 MG/DL (ref 0.7–1.2)
DIFFERENTIAL TYPE: ABNORMAL
EKG ATRIAL RATE: 60 BPM
EKG P AXIS: 78 DEGREES
EKG P-R INTERVAL: 236 MS
EKG Q-T INTERVAL: 460 MS
EKG QRS DURATION: 160 MS
EKG QTC CALCULATION (BAZETT): 460 MS
EKG R AXIS: 96 DEGREES
EKG T AXIS: 64 DEGREES
EKG VENTRICULAR RATE: 60 BPM
EOSINOPHILS RELATIVE PERCENT: 3 % (ref 1–4)
GFR AFRICAN AMERICAN: >60 ML/MIN
GFR NON-AFRICAN AMERICAN: >60 ML/MIN
GFR SERPL CREATININE-BSD FRML MDRD: NORMAL ML/MIN/{1.73_M2}
GFR SERPL CREATININE-BSD FRML MDRD: NORMAL ML/MIN/{1.73_M2}
GLUCOSE BLD-MCNC: 97 MG/DL (ref 70–99)
HCT VFR BLD CALC: 38.9 % (ref 40.7–50.3)
HEMOGLOBIN: 12.9 G/DL (ref 13–17)
IMMATURE GRANULOCYTES: 0 %
INR BLD: 1.2
LYMPHOCYTES # BLD: 18 % (ref 24–43)
MCH RBC QN AUTO: 30.5 PG (ref 25.2–33.5)
MCHC RBC AUTO-ENTMCNC: 33.2 G/DL (ref 28.4–34.8)
MCV RBC AUTO: 92 FL (ref 82.6–102.9)
MONOCYTES # BLD: 8 % (ref 3–12)
NRBC AUTOMATED: 0 PER 100 WBC
PARTIAL THROMBOPLASTIN TIME: 34 SEC (ref 23.9–33.8)
PDW BLD-RTO: 14.5 % (ref 11.8–14.4)
PLATELET # BLD: 174 K/UL (ref 138–453)
PLATELET ESTIMATE: ABNORMAL
PMV BLD AUTO: 9.1 FL (ref 8.1–13.5)
POTASSIUM SERPL-SCNC: 4.2 MMOL/L (ref 3.7–5.3)
PROTHROMBIN TIME: 14.6 SEC (ref 11.5–14.2)
RBC # BLD: 4.23 M/UL (ref 4.21–5.77)
RBC # BLD: ABNORMAL 10*6/UL
SARS-COV-2, RAPID: NOT DETECTED
SEG NEUTROPHILS: 70 % (ref 36–65)
SEGMENTED NEUTROPHILS ABSOLUTE COUNT: 4.2 K/UL (ref 1.5–8.1)
SODIUM BLD-SCNC: 140 MMOL/L (ref 135–144)
SPECIMEN DESCRIPTION: NORMAL
TOTAL PROTEIN: 7 G/DL (ref 6.4–8.3)
WBC # BLD: 6 K/UL (ref 3.5–11.3)
WBC # BLD: ABNORMAL 10*3/UL

## 2021-11-07 PROCEDURE — 99218 PR INITIAL OBSERVATION CARE/DAY 30 MINUTES: CPT | Performed by: SURGERY

## 2021-11-07 PROCEDURE — 87635 SARS-COV-2 COVID-19 AMP PRB: CPT

## 2021-11-07 PROCEDURE — 99284 EMERGENCY DEPT VISIT MOD MDM: CPT

## 2021-11-07 PROCEDURE — 93010 ELECTROCARDIOGRAM REPORT: CPT | Performed by: INTERNAL MEDICINE

## 2021-11-07 PROCEDURE — 2709999900 HC NON-CHARGEABLE SUPPLY: Performed by: SURGERY

## 2021-11-07 PROCEDURE — 2500000003 HC RX 250 WO HCPCS: Performed by: EMERGENCY MEDICINE

## 2021-11-07 PROCEDURE — 85610 PROTHROMBIN TIME: CPT

## 2021-11-07 PROCEDURE — 3609012400 HC EGD TRANSORAL BIOPSY SINGLE/MULTIPLE: Performed by: SURGERY

## 2021-11-07 PROCEDURE — 7100000011 HC PHASE II RECOVERY - ADDTL 15 MIN: Performed by: SURGERY

## 2021-11-07 PROCEDURE — 7100000010 HC PHASE II RECOVERY - FIRST 15 MIN: Performed by: SURGERY

## 2021-11-07 PROCEDURE — 2500000003 HC RX 250 WO HCPCS: Performed by: NURSE ANESTHETIST, CERTIFIED REGISTERED

## 2021-11-07 PROCEDURE — 88342 IMHCHEM/IMCYTCHM 1ST ANTB: CPT

## 2021-11-07 PROCEDURE — 36415 COLL VENOUS BLD VENIPUNCTURE: CPT

## 2021-11-07 PROCEDURE — 2580000003 HC RX 258: Performed by: EMERGENCY MEDICINE

## 2021-11-07 PROCEDURE — C9803 HOPD COVID-19 SPEC COLLECT: HCPCS

## 2021-11-07 PROCEDURE — 85730 THROMBOPLASTIN TIME PARTIAL: CPT

## 2021-11-07 PROCEDURE — 7100000000 HC PACU RECOVERY - FIRST 15 MIN: Performed by: SURGERY

## 2021-11-07 PROCEDURE — 71045 X-RAY EXAM CHEST 1 VIEW: CPT

## 2021-11-07 PROCEDURE — 96374 THER/PROPH/DIAG INJ IV PUSH: CPT

## 2021-11-07 PROCEDURE — 6360000002 HC RX W HCPCS: Performed by: NURSE ANESTHETIST, CERTIFIED REGISTERED

## 2021-11-07 PROCEDURE — 85025 COMPLETE CBC W/AUTO DIFF WBC: CPT

## 2021-11-07 PROCEDURE — 3700000000 HC ANESTHESIA ATTENDED CARE: Performed by: SURGERY

## 2021-11-07 PROCEDURE — 80053 COMPREHEN METABOLIC PANEL: CPT

## 2021-11-07 PROCEDURE — 88305 TISSUE EXAM BY PATHOLOGIST: CPT

## 2021-11-07 PROCEDURE — 93005 ELECTROCARDIOGRAM TRACING: CPT | Performed by: EMERGENCY MEDICINE

## 2021-11-07 RX ORDER — SUCRALFATE 1 G/1
1 TABLET ORAL 4 TIMES DAILY
Qty: 120 TABLET | Refills: 3 | Status: SHIPPED | OUTPATIENT
Start: 2021-11-07

## 2021-11-07 RX ORDER — FENTANYL CITRATE 50 UG/ML
INJECTION, SOLUTION INTRAMUSCULAR; INTRAVENOUS PRN
Status: DISCONTINUED | OUTPATIENT
Start: 2021-11-07 | End: 2021-11-07 | Stop reason: SDUPTHER

## 2021-11-07 RX ORDER — LIDOCAINE HYDROCHLORIDE 20 MG/ML
INJECTION, SOLUTION EPIDURAL; INFILTRATION; INTRACAUDAL; PERINEURAL PRN
Status: DISCONTINUED | OUTPATIENT
Start: 2021-11-07 | End: 2021-11-07 | Stop reason: SDUPTHER

## 2021-11-07 RX ORDER — PANTOPRAZOLE SODIUM 40 MG/1
40 TABLET, DELAYED RELEASE ORAL DAILY
Qty: 30 TABLET | Refills: 3 | Status: SHIPPED | OUTPATIENT
Start: 2021-11-07 | End: 2022-05-12

## 2021-11-07 RX ORDER — CLOPIDOGREL BISULFATE 75 MG/1
75 TABLET ORAL DAILY
Qty: 90 TABLET | Refills: 3 | Status: SHIPPED | OUTPATIENT
Start: 2021-11-07

## 2021-11-07 RX ORDER — SODIUM CHLORIDE 9 MG/ML
25 INJECTION, SOLUTION INTRAVENOUS PRN
Status: DISCONTINUED | OUTPATIENT
Start: 2021-11-07 | End: 2021-11-07 | Stop reason: HOSPADM

## 2021-11-07 RX ORDER — PROPOFOL 10 MG/ML
INJECTION, EMULSION INTRAVENOUS PRN
Status: DISCONTINUED | OUTPATIENT
Start: 2021-11-07 | End: 2021-11-07 | Stop reason: SDUPTHER

## 2021-11-07 RX ORDER — 0.9 % SODIUM CHLORIDE 0.9 %
1000 INTRAVENOUS SOLUTION INTRAVENOUS ONCE
Status: COMPLETED | OUTPATIENT
Start: 2021-11-07 | End: 2021-11-07

## 2021-11-07 RX ORDER — SODIUM CHLORIDE 0.9 % (FLUSH) 0.9 %
5-40 SYRINGE (ML) INJECTION EVERY 12 HOURS SCHEDULED
Status: DISCONTINUED | OUTPATIENT
Start: 2021-11-07 | End: 2021-11-07 | Stop reason: HOSPADM

## 2021-11-07 RX ORDER — SODIUM CHLORIDE, SODIUM LACTATE, POTASSIUM CHLORIDE, CALCIUM CHLORIDE 600; 310; 30; 20 MG/100ML; MG/100ML; MG/100ML; MG/100ML
INJECTION, SOLUTION INTRAVENOUS CONTINUOUS
Status: DISCONTINUED | OUTPATIENT
Start: 2021-11-07 | End: 2021-11-07 | Stop reason: HOSPADM

## 2021-11-07 RX ORDER — SODIUM CHLORIDE 0.9 % (FLUSH) 0.9 %
5-40 SYRINGE (ML) INJECTION PRN
Status: DISCONTINUED | OUTPATIENT
Start: 2021-11-07 | End: 2021-11-07 | Stop reason: HOSPADM

## 2021-11-07 RX ADMIN — SODIUM CHLORIDE 1000 ML: 9 INJECTION, SOLUTION INTRAVENOUS at 10:37

## 2021-11-07 RX ADMIN — PROPOFOL 40 MG: 10 INJECTION, EMULSION INTRAVENOUS at 11:52

## 2021-11-07 RX ADMIN — PROPOFOL 20 MG: 10 INJECTION, EMULSION INTRAVENOUS at 11:57

## 2021-11-07 RX ADMIN — FENTANYL CITRATE 25 MCG: 50 INJECTION INTRAMUSCULAR; INTRAVENOUS at 11:52

## 2021-11-07 RX ADMIN — GLUCAGON HYDROCHLORIDE 1 MG: KIT at 10:40

## 2021-11-07 RX ADMIN — LIDOCAINE HYDROCHLORIDE 4 ML: 20 INJECTION, SOLUTION EPIDURAL; INFILTRATION; INTRACAUDAL; PERINEURAL at 11:52

## 2021-11-07 ASSESSMENT — ENCOUNTER SYMPTOMS
VOMITING: 0
CHOKING: 0
NAUSEA: 0
BACK PAIN: 0
SORE THROAT: 0
ABDOMINAL PAIN: 0
TROUBLE SWALLOWING: 1
BLOOD IN STOOL: 0
SHORTNESS OF BREATH: 0
COUGH: 0

## 2021-11-07 ASSESSMENT — PAIN SCALES - GENERAL
PAINLEVEL_OUTOF10: 0
PAINLEVEL_OUTOF10: 8

## 2021-11-07 ASSESSMENT — PAIN DESCRIPTION - LOCATION: LOCATION: ABDOMEN

## 2021-11-07 ASSESSMENT — PAIN DESCRIPTION - ORIENTATION: ORIENTATION: MID;UPPER

## 2021-11-07 ASSESSMENT — PAIN DESCRIPTION - PAIN TYPE: TYPE: ACUTE PAIN

## 2021-11-07 NOTE — ED PROVIDER NOTES
[facility]  [facility] COMPLAINT   Chief Complaint   Patient presents with    Foreign Body     Pt states he is unable to keep anything down after eating a pork chop at 2 pm yesterday and feels it is stuck      HPI   Kane Salcedo is a 80 y.o. male who presents with chest pain and discomfort after eating. Yesterday he was enjoying a pork chop for lunch when he found that it felt like it got stuck. He then could not swallow anything. He was sore able to get some spit down but could not take a drink of anything. It is noted that he has not had this happen before. No other current complaints at this time. Pain is moderately severe but worse whenever he drinks anything because then he has some spasms and then vomits it back up. REVIEW OF SYSTEMS   Cardiac: +Chest Pain, Denies syncope  Respiratory: Denies cough or hemoptysis  GI: +Vomiting , no Diarrhea  General: Denies Fever   All other review of systems otherwise negative.    PAST MEDICAL & SURGICAL HISTORY   Past Medical History:   Diagnosis Date    Cataract     GERD (gastroesophageal reflux disease)     Hearing loss     Hyperlipidemia     Osteoarthritis     Stroke ([facility] [address] 2017    Ulcer     in past     Uses hearing aid      Past Surgical History:   Procedure Laterality Date    ARM SURGERY      left arm rebuild     EYE SURGERY      , cataract    SHOULDER SURGERY      right    TURP  07/13/2021    TURP N/A 7/13/2021    CYSTOSCOPY TRANSURETHRAL RESECTION PROSTATE LASER-PVP GREENLIGHT performed by Francisco Drew MD at [address] [place] [address]   Current Outpatient Rx   Medication Sig Dispense Refill    polyethylene glycol (MIRALAX) 17 g PACK packet Take 17 g by mouth daily      tamsulosin (FLOMAX) 0.4 MG capsule Take 1 capsule by mouth 2 times daily 60 capsule 11    atorvastatin (LIPITOR) 40 MG tablet Take 1 tablet by mouth nightly 90 tablet 3    clopidogrel (PLAVIX) 75 MG tablet Take 1 tablet by mouth daily 90 tablet 3    nitroGLYCERIN (NITROSTAT) 0.4 MG SL tablet Place 0.4 mg under the tongue every 5 minutes as needed for Chest pain up to max of 3 total doses. If no relief after 1 dose, call 911. ALLERGIES   No Known Allergies   SOCIAL & FAMILY HISTORY   Social History     Socioeconomic History    Marital status:      Spouse name: None    Number of children: None    Years of education: None    Highest education level: None   Occupational History    None   Tobacco Use    Smoking status: Former Smoker     Types: Cigarettes    Smokeless tobacco: Never Used    Tobacco comment: when he was young [age]y/o   Substance and Sexual Activity    Alcohol use: Not Currently     Comment: very rarely    Drug use: No    Sexual activity: None   Other Topics Concern    None   Social History Narrative    None     Social Determinants of Health     Financial Resource Strain:     Difficulty of Paying Living Expenses: Not on file   Food Insecurity:     Worried About Running Out of Food in the Last Year: Not on file    Marielos of Food in the Last Year: Not on file   Transportation Needs:     Lack of Transportation (Medical): Not on file    Lack of Transportation (Non-Medical):  Not on file   Physical Activity:     Days of Exercise per Week: Not on file    Minutes of Exercise per Session: Not on file   Stress:     Feeling of Stress : Not on file   Social Connections:     Frequency of Communication with Friends and Family: Not on file    Frequency of Social Gatherings with Friends and Family: Not on file    Attends Rastafarian Services: Not on file    Active Member of Clubs or Organizations: Not on file    Attends Club or Organization Meetings: Not on file    Marital Status: Not on file   Intimate Partner Violence:     Fear of Current or Ex-Partner: Not on file    Emotionally Abused: Not on file    Physically Abused: Not on file    Sexually Abused: Not on file   Housing Stability:     Unable to Pay for Housing in the Last Year: Not on file    Number of Places Lived in the Last Year: Not on file    Unstable Housing in the Last Year: Not on file     Family History   Problem Relation Age of Onset    Cancer Mother         bladder    Heart Disease Father     High Blood Pressure Brother     Stroke Brother     High Blood Pressure Brother     Stroke Brother       PHYSICAL EXAM   VITAL SIGNS: BP (!) 141/52   Pulse 77   Temp 98 °F (36.7 °C)   Resp 18   Ht 6' 4\" (1.93 m)   Wt 170 lb (77.1 kg)   SpO2 100%   BMI 20.69 kg/m²    Constitutional: Well developed, well nourished, no acute distress   HENT: Atraumatic, moist mucus membranes  Neck: supple, no JVD   Respiratory: Lungs Clear, no retractions   Cardiovascular: Reg rate and rhythm   Vascular: Radial pulses 2+ equal bilaterally  GI: Soft, nontender, normal bowel sounds  Musculoskeletal: No edema, no deformities  Integument: Skin warm and dry, no petechiae   Neurologic: Alert & oriented, normal speech  Psych: Pleasant affect, no hallucinations     Ekg: nsr, rate 60, RBBB, no stemi    RADIOLOGY/PROCEDURES   XR CHEST PORTABLE   Final Result   Chronic findings in the chest without acute airspace disease identified. Prominent gaseous distention of bowel in the upper abdomen. ED COURSE & MEDICAL DECISION MAKING   Pertinent Labs & Imaging studies reviewed and interpreted. (See chart for details)  See chart for details of medications given during the ED stay. Vitals:    11/07/21 1030   BP: (!) 141/52   Pulse:    Resp:    Temp:    SpO2:      Differential Diagnosis: Acute Coronary Syndrome, Congestive Heart Failure, Myocardial Infarction, Pulmonary Embolus, Thoracic Dissection, Pneumonia, Pneumothorax, other. MDM: Patient well-appearing. He is going to go up to the OR for an endoscopy by Dr. Yamile Cortez and for suspected esophageal food bolus impaction.   While he was here I made him drink some water and he threw it up about 30 seconds later and a small to medium sized piece of pork came out but then he drinks more water and also threw that up so would seem there is still more pork in his esophagus    FINAL IMPRESSION   1.  Foreign body in esophagus, initial encounter        Plan: to OR  Electronically signed by: Chapito Samuels MD, 11/7/2021 10:56 AM  (This note was completed with a voice recognition program)              Chapito Samuels MD  11/07/21 [id number]

## 2021-11-07 NOTE — OP NOTE
[facility], [address] [place] [address]                                OPERATIVE REPORT    PATIENT NAME: Radha Chun                      :        1932  MED REC NO:   [id number]                              ROOM:  ACCOUNT NO:   [de-identified]                           ADMIT DATE: 2021  PROVIDER:     Mariel Hartley    DATE OF PROCEDURE:  2021    ENDOSCOPY REPORT    ATTENDING SURGEON:  Dr. Mariel Hartley. PRIMARY CARE PROVIDER:  Macho Guidry DO    PREOPERATIVE DIAGNOSES:  1. Dysphagia. 2.  Esophageal food obstruction. 3.  History of peptic ulcer disease. 4.  Coronary artery disease, history of CVA, Plavix. 5.  Poor dentition. 6.  BMI of 21. POSTOPERATIVE DIAGNOSES:  1. Esophageal food obstruction. 2.  Erosive gastritis. 3.  Diffuse superficial gastritis without ulceration. PROCEDURES PERFORMED:  1. Esophagogastroduodenoscopy. 2.  Clearance of esophageal food obstruction. 3.  Prepyloric antral biopsies. ANESTHESIA:  MAC.    ESTIMATED BLOOD LOSS:  Less than 20 mL. INDICATIONS:  The patient is a pleasant 51-year-old white male who felt  a piece of pork become lodged in his throat while eating yesterday. He  has since had difficulty managing oral secretions, unable to swallow. No relief with glucagon. Remote history of peptic ulcer disease. No  current reflux symptoms. No current acid blockade. History of coronary  artery disease and prior CVA with right-sided weakness, taking Plavix. At this time, diagnostic EGD with clearance of the esophageal  obstruction is indicated. DESCRIPTION OF PROCEDURE:  After obtaining informed consent with  discussion of the risks, benefits, and alternatives including a risk of  GI bleeding, perforation, missed lesions, COVID-19 exposure/infection,  etc., the patient was taken to the operating theater and placed in the  left lateral recumbent position.   Following adequate IV sedation, an  endoscope was passed over the tongue into the posterior pharynx. Vocal  folds were visualized and appeared normal.  The scope was directed into  the esophagus and then onto the GE junction. Upper and mid esophagus  appeared normal.  In the lower esophagus, a large piece of meat was  found causing distal obstruction. This was passed into the stomach with  gentle pressure. Erosive distal esophagitis was present. The  esophageal mucosa was friable and bled easily. There were no obvious  strictures, no rings, no webs, no varices, and no lesions. The stomach  was entered and had normal distensibility. On retroflexion, the fundus  and cardia appeared otherwise normal.  The gastric food bolus was  present limiting fine mucosal evaluation. The body and prepyloric  antrum had diffuse superficial gastritis with linear erosions. No leilani  ulcerations. Prepyloric antral biopsies were obtained. Pylorus was  patent. The duodenum was entered. First, second, and third portions of  the duodenum were normal.  The stomach was decompressed by suction as  the scope was removed. Final inspection of the GE junction found  adequate hemostasis. The patient tolerated the procedure well and was  transferred to PACU in stable condition. SPECIMENS:  Prepyloric antral biopsies. DRAINS:  None. COMPLICATIONS:  None. DISPOSITION:  To PACU awake, alert, and stable. Following recovery, we  will discharge the patient home on a full liquid diet for the next 24  hours followed by advancement to a soft pureed diet. We will hold  Plavix for the time being given the friability of distal esophagus and  easy bleeding. Prescriptions for Protonix and Carafate provided. Followup will be with me in one week to review endoscopy and pathology. Strongly encourage dental evaluation for consideration of bridging  versus denture fitting.         ANCA Sanders    D: 11/07/2021 12:08:47       T: 11/07/2021 12:12:01     [email or web address]  Job#: [id number]     Doc#: [id number]    CC:  Celso Dang

## 2021-11-07 NOTE — H&P
Rachel Juares is an 80 y.o.  male. Allergies: No Known Allergies    Blood pressure (!) 157/44, pulse 60, temperature 97.8 °F (36.6 °C), temperature source Temporal, resp. rate 16, height 6' 4\" (1.93 m), weight 170 lb (77.1 kg), SpO2 98 %. HPI    Mr. Koby Jose is a pleasant, highly functioning 49-year-old white male who presents through [facility] with dysphagia and esophageal food bolus. He is unable to manage saliva and other liquids. No improvement with glucagon. Plain films of the chest show airspace disease with no other obvious pathology. History of peptic ulcer disease and reflux, currently asymptomatic and not requiring prescription medication. No history of caustic ingestion. Poor dentition with gaps in his teeth. No recent dental evaluation. No prior EGD nor esophageal dilatation. History of coronary artery disease, CVA for which he takes Plavix. No recent weight changes, 170 pounds, BMI 21. No cough, fever nor other respiratory symptoms. No history of COVID-19. Chronic constipation managed with MiraLAX. No family history of GI malignancy to his knowledge. Patient quit tobacco many years ago. [place]ired interstate . Review of Systems   Constitutional: Negative for activity change, appetite change, chills, fever and unexpected weight change. HENT: Positive for trouble swallowing. Negative for nosebleeds, sneezing and sore throat. Eyes: Negative for visual disturbance. Respiratory: Negative for cough, choking and shortness of breath. Cardiovascular: Negative for chest pain, palpitations and leg swelling. Gastrointestinal: Negative for abdominal pain, blood in stool, nausea and vomiting. Occasional reflux symptoms, not requiring prescription medication. History of peptic ulcer disease per chart. Genitourinary: Negative for dysuria, flank pain and hematuria. Musculoskeletal: Positive for arthralgias. Negative for back pain, gait problem and myalgias. Strain:     Difficulty of Paying Living Expenses: Not on file   Food Insecurity:     Worried About Running Out of Food in the Last Year: Not on file    Marielos of Food in the Last Year: Not on file   Transportation Needs:     Lack of Transportation (Medical): Not on file    Lack of Transportation (Non-Medical): Not on file   Physical Activity:     Days of Exercise per Week: Not on file    Minutes of Exercise per Session: Not on file   Stress:     Feeling of Stress : Not on file   Social Connections:     Frequency of Communication with Friends and Family: Not on file    Frequency of Social Gatherings with Friends and Family: Not on file    Attends Buddhism Services: Not on file    Active Member of [address] [place] [address] [employer] or Organizations: Not on file    Attends Club or Organization Meetings: Not on file    Marital Status: Not on file   Intimate Partner Violence:     Fear of Current or Ex-Partner: Not on file    Emotionally Abused: Not on file    Physically Abused: Not on file    Sexually Abused: Not on file   Housing Stability:     Unable to Pay for Housing in the Last Year: Not on file    Number of [place] in the Last Year: Not on file    Unstable Housing in the Last Year: Not on file       Physical Exam  Vitals and nursing note reviewed. Constitutional:       Appearance: He is well-developed. HENT:      Head: Normocephalic and atraumatic. Eyes:      General: No scleral icterus. Conjunctiva/sclera: Conjunctivae normal.      Pupils: Pupils are equal, round, and reactive to light. Neck:      Vascular: No JVD. Trachea: No tracheal deviation. Cardiovascular:      Rate and Rhythm: Normal rate and regular rhythm. Pulmonary:      Effort: Pulmonary effort is normal. No respiratory distress. Chest:      Chest wall: No tenderness. Abdominal:      General: There is no distension. Palpations: Abdomen is soft. There is no mass. Tenderness: There is no abdominal tenderness.  There is no guarding or rebound. Musculoskeletal:         General: Normal range of motion. Cervical back: Normal range of motion and neck supple. Lymphadenopathy:      Cervical: No cervical adenopathy. Skin:     General: Skin is warm and dry. Findings: No erythema or rash. Neurological:      Mental Status: He is alert and oriented to person, place, and time. Cranial Nerves: No cranial nerve deficit. Psychiatric:         Behavior: Behavior normal.         Thought Content: Thought content normal.         Judgment: Judgment normal.          EKG 12 Lead [[id number]]    Collected: 11/07/21 1001    Updated: 11/07/21 1128     Ventricular Rate 60 BPM    Atrial Rate 60 BPM    P-R Interval 236 ms    QRS Duration 160 ms    Q-T Interval 460 ms    QTc Calculation (Bazett) 460 ms    P Axis 78 degrees    R Axis 96 degrees    T Axis 64 degrees   Narrative:     Sinus rhythm with 1st degree A-V block with Fusion complexes   Rightward axis   Non-specific intra-ventricular conduction block   Abnormal ECG   When compared with ECG of 12-MAY-2021 16:14,   Fusion complexes are now Present   Non-specific intra-ventricular conduction block has replaced Right bundle branch block   COVID-19, Rapid [[id number]]    Collected: 11/07/21 1019    Updated: 11/07/21 1051    Specimen Source: Nasopharyngeal Swab     Specimen Description . NASOPHARYNGEAL SWAB    SARS-CoV-2, Rapid Not Detected    Comment:        Rapid NAAT:  The specimen is NEGATIVE for SARS-CoV-2, the novel coronavirus associated with   COVID-19.         The ID NOW COVID-19 assay is designed to detect the virus that causes COVID-19 in patients   with signs and symptoms of infection who are suspected of COVID-19. An individual without symptoms of COVID-19 and who is not shedding SARS-CoV-2 virus would   expect to have a negative (not detected) result in this assay.    Negative results should be treated as presumptive and, if inconsistent with clinical signs   and symptoms or necessary for patient management,   should be tested with an alternative molecular assay. Negative results do not preclude   SARS-CoV-2 infection and   should not be used as the sole basis for patient management decisions.         Fact sheet for Healthcare Providers: Luba   Fact sheet for Patients: Luba           Methodology: Isothermal Nucleic Acid Amplification       APTT [[id number]] (Abnormal)    Collected: 11/07/21 1015    Updated: 11/07/21 1050    Specimen Source: Blood     PTT 34.0 High  sec    Comment:        IV Heparin Therapy Range:       62.0-94.0             Protime-INR [[id number]] (Abnormal)    Collected: 11/07/21 1015    Updated: 11/07/21 1049    Specimen Source: Blood     Protime 14.6 High  sec    INR 1.2    Comment:        Non-therapeutic Range:      INR = 0.9-1.2   Therapeutic Range:    Moderate Anticoagulant Intensity:      INR = 2.0-3.0    High Anticoagulant Intensity:      INR = 2.5-3. 5             Comprehensive Metabolic Panel w/ Reflex to MG [[id number]]    Collected: 11/07/21 1015    Updated: 11/07/21 1048    Specimen Source: Blood     Glucose 97 mg/dL    BUN 17 mg/dL    CREATININE 0.88 mg/dL    Bun/Cre Ratio 19    Calcium 9.4 mg/dL    Sodium 140 mmol/L    Potassium 4.2 mmol/L    Chloride 105 mmol/L    CO2 24 mmol/L    Anion Gap 11 mmol/L    Alkaline Phosphatase 122 U/L    ALT 15 U/L    AST 17 U/L    Total Bilirubin 1.17 mg/dL    Total Protein 7.0 g/dL    Albumin 4.1 g/dL    Albumin/Globulin Ratio 1.4    GFR Non-African American >60 mL/min    GFR African American >60 mL/min    GFR Comment         Comment: Average GFR for 79or more years old:    65 mL/min/1.73sq m   Chronic Kidney Disease:    <60 mL/min/1.73sq m   Kidney failure:    <15 mL/min/1.73sq m               eGFR calculated using average adult body mass.  Additional eGFR calculator available at:         Who is [email or web address]       GFR Staging         Comment: Stage 1: Some kidney damage normal GFR   Stage 2: Mild kidney damage GFR 60-89   Stage 3: Moderate kidney damage GFR 30-59   Stage 4: Severe kidney damage GFR 15-29   Stage 5: Severe kidney damage GFR <15   ESRD - chronic treatment by dialysis or transplant             XR CHEST PORTABLE [[id number]]    Collected: 11/07/21 1012    Updated: 11/07/21 1037    Narrative:     EXAMINATION:   ONE XRAY VIEW OF THE CHEST     11/7/2021 10:10 am     COMPARISON:   08/04/2021     HISTORY:   ORDERING SYSTEM PROVIDED HISTORY: food impaction, pre op   TECHNOLOGIST PROVIDED HISTORY:   food impaction, pre op     FINDINGS:   The cardiomediastinal silhouette is unchanged in appearance. Emphysematous   changes are noted.  Chronic reticular opacities in the lung bases again   demonstrated.  There is no consolidation, pneumothorax, or evidence of edema. No effusion is appreciated. The osseous structures are unchanged in   appearance.  Prominence of gaseous bowel distention in the upper abdomen. Impression:     Chronic findings in the chest without acute airspace disease identified. Prominent gaseous distention of bowel in the upper abdomen. CBC Auto Differential [[id number]] (Abnormal)    Collected: 11/07/21 1015    Updated: 11/07/21 1031    Specimen Source: Blood     WBC 6.0 k/uL    RBC 4.23 m/uL    Hemoglobin 12.9 Low  g/dL    Hematocrit 38.9 Low  %    MCV 92.0 fL    MCH 30.5 pg    MCHC 33.2 g/dL    RDW 14.5 High  %    Platelets 421 k/uL    MPV 9.1 fL    NRBC Automated 0.0 per 100 WBC    Differential Type NOT REPORTED    Seg Neutrophils 70 High  %    Lymphocytes 18 Low  %    Monocytes 8 %    Eosinophils % 3 %    Basophils 1 %    Immature Granulocytes 0 %    Segs Absolute 4.20 k/uL    Absolute Lymph # 1.10 k/uL    Absolute Mono # 0.48 k/uL    Absolute Eos # 0.15 k/uL         Assessment:    3  77-year-old white male, dysphagia with esophageal food bolus  2. Poor dentition  3.   History of peptic ulcer disease and GERD  4. Coronary artery disease, history of CVA, Plavix   5. Chronic constipation, MiraLAX   6. BMI 21    Plan:    Discussed at length with Mr. Darrel Dowd is remote history of peptic ulcer disease, reflux symptoms not currently active nor requiring prescription medication, with current dysphagia and probable esophageal food bolus. We will proceed with EGD and clearance of the food bolus. Risks, benefits, alternatives thoroughly reviewed and accepted by Mr. Darrel Dowd including GI bleeding, perforation, missed lesions, COVID-19 exposure/infection, etc.  If unable to clear the bolus, will require transfer to tertiary center. Discussed importance of dental evaluation in the coming weeks for consideration of bridging of dental gaps or denture fitting. He conveys clear understanding and is in agreement.     Bari Garcia MD  11/7/2021

## 2021-11-07 NOTE — PROGRESS NOTES
Patient states ready to be discharged at this time. Discharge instructions given to pt and caregiver. Both verbalize understanding,deny any questions and/or concerns. Pt transfer off unit in wheelchair w/ all belongings. Discharge Criteria    Inpatients must meet Criteria 1 through 7. All other patients are either YES or N/A. If a NO is chosen then Anesthesia or Surgeon must be notified. 1.  Minimum 30 minutes after last dose of sedative medication, minimum 120 minutes after last dose of reversal agent. Yes      2. Systolic BP stable within 20 mmHg for 30 minutes & systolic BP between 90 & 176 or within 10 mmHg of baseline. Yes      3. Pulse between 60 and 100 or within 10 bpm of baseline. Yes      4. Spontaneous respiratory rate >/= 10 per minute. Yes      5. SaO2 >/= 95 or  >/= baseline. Yes      6. Able to cough and swallow or return to baseline function. Yes      7. Alert and oriented or return to baseline mental status. Yes      8. Demonstrates controlled, coordinated movements, ambulates with steady gait, or return to baseline activity function. Yes      9. Minimal or no pain or nausea, or at a level tolerable and acceptable to patient. Yes      10. Takes and retains oral fluids as allowed. Yes      11. Procedural / perioperative site stable. Minimal or no bleeding. Yes          12. If GI endoscopy procedure, minimal or no abdominal distention or passing flatus. Yes      13. Written discharge instructions and emergency telephone number provided. Yes      14. Accompanied by a responsible adult.     Yes

## 2021-11-07 NOTE — ANESTHESIA PRE PROCEDURE
Department of Anesthesiology  Preprocedure Note       Name:  Kane Salcedo   Age:  80 y.o.  :  1932                                          MRN:  [id number]         Date:  2021      Surgeon: Reggie Garnica):  Elijah Caba MD    Procedure: Procedure(s):  EGD ESOPHAGOGASTRODUODENOSCOPY    Medications prior to admission:   Prior to Admission medications    Medication Sig Start Date End Date Taking? Authorizing Provider   polyethylene glycol (MIRALAX) 17 g PACK packet Take 17 g by mouth daily    Historical Provider, MD   tamsulosin (FLOMAX) 0.4 MG capsule Take 1 capsule by mouth 2 times daily 21   Atiya Benz PA-C   nitroGLYCERIN (NITROSTAT) 0.4 MG SL tablet Place 0.4 mg under the tongue every 5 minutes as needed for Chest pain up to max of 3 total doses. If no relief after 1 dose, call 911. Historical Provider, MD   atorvastatin (LIPITOR) 40 MG tablet Take 1 tablet by mouth nightly 17   Rad Loja MD   clopidogrel (PLAVIX) 75 MG tablet Take 1 tablet by mouth daily 17   Rad Loja MD       Current medications:    No current facility-administered medications for this visit. No current outpatient medications on file.      Facility-Administered Medications Ordered in Other Visits   Medication Dose Route Frequency Provider Last Rate Last Admin    0.9 % sodium chloride bolus  1,000 mL IntraVENous Once Yane Stewart MD 1,000 mL/hr at 21 1037 1,000 mL at 21 1037       Allergies:  No Known Allergies    Problem List:    Patient Active Problem List   Diagnosis Code    Right-sided muscle weakness M62.81    Hypertension I10    Vitamin D deficiency E55.9    Acute ischemic lacunar infarct  I63.9    Right sided weakness R53.1    BPH with obstruction/lower urinary tract symptoms N40.1, N13.8       Past Medical History:        Diagnosis Date    Cataract     GERD (gastroesophageal reflux disease)     Hearing loss     Hyperlipidemia     Osteoarthritis  Stroke [facility]) 2017    Ulcer     in past     Uses hearing aid        Past Surgical History:        Procedure Laterality Date    ARM SURGERY      left arm rebuild     EYE SURGERY      , cataract    SHOULDER SURGERY      right    TURP  07/13/2021    TURP N/A 7/13/2021    CYSTOSCOPY TRANSURETHRAL RESECTION PROSTATE LASER-PVP GREENLIGHT performed by Leandro Fuchs MD at [address] [place] [address] History:    Social History     Tobacco Use    Smoking status: Former Smoker     Types: Cigarettes    Smokeless tobacco: Never Used    Tobacco comment: when he was young [age]y/o   Substance Use Topics    Alcohol use: Not Currently     Comment: very rarely                                Counseling given: Not Answered  Comment: when he was young [age]y/o      Vital Signs (Current): There were no vitals filed for this visit.                                            BP Readings from Last 3 Encounters:   11/07/21 (!) 157/44   10/25/21 128/62   08/04/21 122/78       NPO Status:                                                                                 BMI:   Wt Readings from Last 3 Encounters:   11/07/21 170 lb (77.1 kg)   10/25/21 170 lb (77.1 kg)   08/04/21 171 lb 9.6 oz (77.8 kg)     There is no height or weight on file to calculate BMI.    CBC:   Lab Results   Component Value Date    WBC 6.0 11/07/2021    RBC 4.23 11/07/2021    HGB 12.9 11/07/2021    HCT 38.9 11/07/2021    MCV 92.0 11/07/2021    RDW 14.5 11/07/2021     11/07/2021       CMP:   Lab Results   Component Value Date     11/07/2021    K 4.2 11/07/2021     11/07/2021    CO2 24 11/07/2021    BUN 17 11/07/2021    CREATININE 0.88 11/07/2021    GFRAA >60 11/07/2021    LABGLOM >60 11/07/2021    GLUCOSE 97 11/07/2021    PROT 7.0 11/07/2021    CALCIUM 9.4 11/07/2021    BILITOT 1.17 11/07/2021    ALKPHOS 122 11/07/2021    AST 17 11/07/2021    ALT 15 11/07/2021       POC Tests: No results for input(s): POCGLU, POCNA, POCK, POCCL, POCBUN, Amy Elisa in the last 72 hours. Coags:   Lab Results   Component Value Date    PROTIME 14.6 11/07/2021    INR 1.2 11/07/2021    APTT 34.0 11/07/2021       HCG (If Applicable): No results found for: PREGTESTUR, PREGSERUM, HCG, HCGQUANT     ABGs: No results found for: PHART, PO2ART, BJH4NBK, CZB2PCV, BEART, Y2OWEJFD     Type & Screen (If Applicable):  No results found for: LABABO, LABRH    Drug/Infectious Status (If Applicable):  No results found for: HIV, HEPCAB    COVID-19 Screening (If Applicable):   Lab Results   Component Value Date    COVID19 Not Detected 11/07/2021           Anesthesia Evaluation    Airway: Mallampati: II  TM distance: >3 FB     Mouth opening: > = 3 FB Dental: normal exam         Pulmonary:Negative Pulmonary ROS and normal exam                               Cardiovascular:    (+) hypertension:,                   Neuro/Psych:   (+) CVA (right sided weakness): residual symptoms,             GI/Hepatic/Renal:   (+) GERD: well controlled,           Endo/Other: Negative Endo/Other ROS                    Abdominal:             Vascular: negative vascular ROS. Other Findings:               Anesthesia Plan      general     ASA 3       Induction: intravenous. Anesthetic plan and risks discussed with patient. Use of blood products discussed with whom consented to blood products. Plan discussed with CRNA. hearing aids: patient significant deficit without.          ISAURA Noonan - MIGUEL   11/7/2021

## 2021-11-08 ENCOUNTER — APPOINTMENT (OUTPATIENT)
Dept: PHYSICAL THERAPY | Age: 86
End: 2021-11-08
Payer: MEDICARE

## 2021-11-08 NOTE — ANESTHESIA POSTPROCEDURE EVALUATION
Department of Anesthesiology  Postprocedure Note    Patient: Lawanda Suarez  MRN: [id number]  [place]: 7/8/1932  Date of evaluation: 11/7/2021  Time:  7:08 PM     Procedure Summary     Date: 11/07/21 Room / Location: [address] [place] [address]    Anesthesia Start: 1150 Anesthesia Stop: 1210    Procedure: EGD BIOPSY (N/A ) Diagnosis: (Food Bolus)    Surgeons: Vanessa Feldman MD Responsible Provider: ISAURA Bryant CRNA    Anesthesia Type: general ASA Status: 3          Anesthesia Type: general    Lizzie Phase I: Lizzie Score: 10    Lizzie Phase II: Lizzie Score: 10    Last vitals: Reviewed and per EMR flowsheets.        Anesthesia Post Evaluation    Patient location during evaluation: bedside  Patient participation: complete - patient participated  Level of consciousness: awake and alert  Airway patency: patent  Nausea & Vomiting: no nausea and no vomiting  Complications: no  Cardiovascular status: hemodynamically stable  Respiratory status: acceptable  Hydration status: stable

## 2021-11-10 LAB — SURGICAL PATHOLOGY REPORT: NORMAL

## 2021-11-12 ENCOUNTER — HOSPITAL ENCOUNTER (OUTPATIENT)
Dept: PHYSICAL THERAPY | Age: 86
Setting detail: THERAPIES SERIES
Discharge: HOME | End: 2021-11-12
Payer: MEDICARE

## 2021-11-12 PROCEDURE — 97113 AQUATIC THERAPY/EXERCISES: CPT

## 2021-11-12 NOTE — PROGRESS NOTES
Phone: Miya           Fax: [phone]                           Outpatient Physical Therapy                                                                            Daily Note    Patient: Cass Dunlap : 1932  CSN #: [id number]   Referring Practitioner:  Evon Marin DO    Referral Date : 10/19/21     Date: 2021    Diagnosis: Leg weakness  Treatment Diagnosis: generalized weakness, difficulty walking    Onset Date: 10/19/21  PT Insurance Information: Medicare  Total # of Visits Approved: 12 Per Physician Order  Total # of Visits to Date: 2  No Show: 0  Canceled Appointment: 0      Pre-Treatment Pain:  0/10  Subjective: Per pt caregiver, pt was apprehensive about coming today but she was able to talk him into it. Pt reports he feels tired today. Exercises:  Exercise 1: HEP: sink ex-- without squats  Exercise 2: >> aquatic therapy program for LE strengthening and endurance  Exercise 3: AQ; water walk x4 laps fwd and lateral in deep  Exercise 4: AQ:  Sink ex x10 in deep ( march, hip abd , fwd kick)  Exercise 5: AQ:      Assessment  Assessment: Pt able to enter/exit 5 pool steps with BUE support and SBA. Pt tolerated ambulation well in the deeper side of water and occasionally ambulate without UE support. Pt reports relief and excitement with his ability to move more freely without being quick to fatigue. WIll continue with aquatics to increase strength/ROM in an unloaded environment. Activity Tolerance  Activity Tolerance: Patient Tolerated treatment well    Patient Education  Exercise technique ,  DOMs and fatigue following water therapy  Pt verbalized/demonstrated good understanding:     [x] Yes         [] No, pt required further clarification.        Post Treatment Pain:  0/10      Plan  Times per week: 2  Plan weeks: 4-6      Goals  (Total # of Visits to Date: 2)      Short term goals  Time Frame for Short term goals: 3 weeks  Short term goal 1: Pt will initiate HEP. Short term goal 2: Pt will initiate aquatic therapy program to assist with improving LE strengthening and endurance. -met    Long term goals  Time Frame for Long term goals : 6 weeks  Long term goal 1: Pt will be independent and compliant with HEP. Long term goal 2: Pt will safely perform TUG with LRAD in </= 13 seconds to decrease risk of falls. Long term goal 3: Pt will demonstrate ability to maintain tandem stance for >/= 10 seconds to improve balance with NBOS. Long term goal 4: Pt will tolerate >/= 40-45mins ther ex/act with minimal rest breaks to improve endurance and activity tolerance. Long term goal 5: Pt will safely ambulate household distances with LRAD to improve functional ambulation throughout house.     Minutes Tracking:  Time In: 7163  Time Out: 4200 [employer]  Minutes: [address], PTA     Date: 11/12/2021

## 2021-11-15 ENCOUNTER — HOSPITAL ENCOUNTER (OUTPATIENT)
Dept: PHYSICAL THERAPY | Age: 86
Setting detail: THERAPIES SERIES
Discharge: HOME | End: 2021-11-15
Payer: MEDICARE

## 2021-11-15 PROCEDURE — 97113 AQUATIC THERAPY/EXERCISES: CPT

## 2021-11-15 NOTE — PROGRESS NOTES
Phone: Miya           Fax: [phone]                           Outpatient Physical Therapy                                                                            Daily Note    Patient: Tahmina Vasquez : 1932  CSN #: [id number]   Referring Practitioner:  Delmar Hoffman DO    Referral Date : 10/19/21     Date: 11/15/2021    Diagnosis: Leg weakness  Treatment Diagnosis: generalized weakness, difficulty walking    Onset Date: 10/19/21  PT Insurance Information: Medicare  Total # of Visits Approved: 12 Per Physician Order  Total # of Visits to Date: 3  No Show: 0  Canceled Appointment: 0      Pre-Treatment Pain:  0/10  Subjective: Per caregiver, pt had a fall a few days ago and is still sore in his L buttock. She reports pt's son was home with him at the time to help back up, reports that they're unsure of what caused his fall. They stated they didn't take pt to ED since pt reported he felt fine but will follow up with PCP. Pt reports no soreness following first pool session, just felt tired. Exercises:  Exercise 1: HEP: sink ex-- without squats  Exercise 3: AQ; water walk x4 laps fwd, lateral, retro in shallow and deep  Exercise 4: AQ:  Sink ex x10 in deep    Assessment  Assessment: Pt tolerated tx well today without needed rest break. He required 1 UE support with ambulation in pool. Pt reports soreness in his L buttock with exercises. Pt and caregiver encouraged to follow up with PCP to rule out possible injury. WIll continue with aquatics to increase strength/ROM in an unloaded environment    Activity Tolerance  Activity Tolerance: Patient Tolerated treatment well    Patient Education  PCP follow up and HEP  Pt verbalized/demonstrated good understanding:     [x] Yes         [] No, pt required further clarification.        Post Treatment Pain: 0 /10      Plan  Times per week: 2  Plan weeks: 4-6      Goals  (Total # of Visits to Date: 3)      Short term goals  Time Frame for Short term goals: 3 weeks  Short term goal 1: Pt will initiate HEP. - met  Short term goal 2: Pt will initiate aquatic therapy program to assist with improving LE strengthening and endurance. -met    Long term goals  Time Frame for Long term goals : 6 weeks  Long term goal 1: Pt will be independent and compliant with HEP. Long term goal 2: Pt will safely perform TUG with LRAD in </= 13 seconds to decrease risk of falls. Long term goal 3: Pt will demonstrate ability to maintain tandem stance for >/= 10 seconds to improve balance with NBOS. Long term goal 4: Pt will tolerate >/= 40-45mins ther ex/act with minimal rest breaks to improve endurance and activity tolerance. Long term goal 5: Pt will safely ambulate household distances with LRAD to improve functional ambulation throughout house.     Minutes Tracking:  Time In: 6617  Time Out: 1733  Minutes: [address] [place], [place]     Date: 11/15/2021

## 2021-11-17 ENCOUNTER — HOSPITAL ENCOUNTER (OUTPATIENT)
Dept: PHYSICAL THERAPY | Age: 86
Setting detail: THERAPIES SERIES
Discharge: HOME | End: 2021-11-17
Payer: MEDICARE

## 2021-11-17 PROCEDURE — 97113 AQUATIC THERAPY/EXERCISES: CPT

## 2021-11-17 NOTE — PROGRESS NOTES
Phone: Miya           Fax: [phone]                           Outpatient Physical Therapy                                                                            Daily Note    Patient: Maddie Azevedo : 1932  CSN #: [id number]   Referring Practitioner:  Monique Shrestha DO    Referral Date : 10/19/21     Date: 2021    Diagnosis: Leg weakness  Treatment Diagnosis: generalized weakness, difficulty walking    Onset Date: 10/19/21  PT Insurance Information: Medicare  Total # of Visits Approved: 12 Per Physician Order  Total # of Visits to Date: 4  No Show: 0  Canceled Appointment: 0      Pre-Treatment Pain: 5-6 /10  Subjective: Pt caregiver reports pt is still sore throughout L hip/pelvic region. Pt reports his pain (points to both hips) has been a 5-6/10. Exercises:  Exercise 1: HEP: sink ex-- without squats  Exercise 3: AQ; water walk x4 laps fwd, lateral, retro in shallow and deep  Exercise 4: AQ:  Sink ex x10 in deep  Exercise 5: AQ:  deep water hanging  Exercise 6: AQ:  sit to stand x10  Exercise 8: AQ: jet massage to decrease tone and pain in low back    Assessment  Assessment: Pt caregiver states pt hasn't developed a bruise on his hip where he fell. Pt reported increased L hip pain with therex in deeper water. continued to encourage calling PCP for follow up from fall- caregiver states she will call after appt today. Pt progressed well with therex today, with bouts of B knee buckling at times d/t fatigue. WIll progress aquatics to increase strength/ROM in an unloaded environment. Activity Tolerance  Activity Tolerance: Patient Tolerated treatment well    Patient Education  PCP follow up, ex technique and progression  Pt verbalized/demonstrated good understanding:     [x] Yes         [] No, pt required further clarification.        Post Treatment Pain:  5-6/10      Plan  Times per week: 2  Plan weeks: 4-6      Goals  (Total # of Visits to Date: 4)      Short term goals  Time Frame for Short term goals: 3 weeks  Short term goal 1: Pt will initiate HEP. - met  Short term goal 2: Pt will initiate aquatic therapy program to assist with improving LE strengthening and endurance. -met    Long term goals  Time Frame for Long term goals : 6 weeks  Long term goal 1: Pt will be independent and compliant with HEP. Long term goal 2: Pt will safely perform TUG with LRAD in </= 13 seconds to decrease risk of falls. Long term goal 3: Pt will demonstrate ability to maintain tandem stance for >/= 10 seconds to improve balance with NBOS. Long term goal 4: Pt will tolerate >/= 40-45mins ther ex/act with minimal rest breaks to improve endurance and activity tolerance. Long term goal 5: Pt will safely ambulate household distances with LRAD to improve functional ambulation throughout house.     Minutes Tracking:  Time In: 52 [facility]  Time Out: 8353  Minutes: [address], PTA     Date: 11/17/2021

## 2021-11-22 ENCOUNTER — HOSPITAL ENCOUNTER (OUTPATIENT)
Dept: PHYSICAL THERAPY | Age: 86
Setting detail: THERAPIES SERIES
Discharge: HOME | End: 2021-11-22
Payer: MEDICARE

## 2021-11-22 PROCEDURE — 97113 AQUATIC THERAPY/EXERCISES: CPT

## 2021-11-22 NOTE — PROGRESS NOTES
Phone: Miya           Fax: [phone]                           Outpatient Physical Therapy                                                                            Daily Note    Patient: Tahmina Vasquez : 1932  CSN #: [id number]   Referring Practitioner:  Delmar Hoffman DO    Referral Date : 10/19/21     Date: 2021    Diagnosis: Leg weakness  Treatment Diagnosis: generalized weakness, difficulty walking    Onset Date: 10/19/21  PT Insurance Information: Medicare  Total # of Visits Approved: 12 Per Physician Order  Total # of Visits to Date: 5  No Show: 0  Canceled Appointment: 0      Pre-Treatment Pain:  0/10  Subjective: Pt declines hip soreness today and reports no issues since last visit. Pt caregiver states that she notices pt with improved energy and is \"out of his chair\" more often during the day. Exercises:  Exercise 1: HEP: sink ex-- without squats  Exercise 2: >> aquatic therapy program for LE strengthening and endurance  Exercise 3: AQ; water walk x4 laps fwd, lateral, retro in shallow and deep - occasionally without UE support  Exercise 4: AQ:  Sink ex x10  Exercise 6: AQ:  sit to stand x10  Exercise 7: AQ: blue bar pull down to waist; pink board push/pull x10 - both with posterior support. Exercise 8: AQ: jet massage to decrease tone and pain in low back with LAQ and march x10      Assessment  Assessment: Pt did well overall with aquatic progressions today; he did require posterior support with board and bar exercises d/t balance deficits. Pt ambulated throughout pool without UE support 50% of time, his balance is poor (+) and amb with flexed trunk and B knees. Will continue with aquatics to increase strength/ROM in an unloaded environment.     Activity Tolerance  Activity Tolerance: Patient Tolerated treatment well    Patient Education  Ex technique, progression    Pt verbalized/demonstrated good understanding:     [x] Yes         [] No, pt required further clarification. Post Treatment Pain:  0/10      Plan  Times per week: 2  Plan weeks: 4-6      Goals  (Total # of Visits to Date: 5)      Short term goals  Time Frame for Short term goals: 3 weeks  Short term goal 1: Pt will initiate HEP. - met  Short term goal 2: Pt will initiate aquatic therapy program to assist with improving LE strengthening and endurance. -met    Long term goals  Time Frame for Long term goals : 6 weeks  Long term goal 1: Pt will be independent and compliant with HEP. Long term goal 2: Pt will safely perform TUG with LRAD in </= 13 seconds to decrease risk of falls. Long term goal 3: Pt will demonstrate ability to maintain tandem stance for >/= 10 seconds to improve balance with NBOS. Long term goal 4: Pt will tolerate >/= 40-45mins ther ex/act with minimal rest breaks to improve endurance and activity tolerance. Long term goal 5: Pt will safely ambulate household distances with LRAD to improve functional ambulation throughout house.     Minutes Tracking:  Time In: 8244  Time Out: 2376  Minutes: David Mendez PTA     Date: 11/22/2021

## 2021-11-24 ENCOUNTER — HOSPITAL ENCOUNTER (OUTPATIENT)
Dept: PHYSICAL THERAPY | Age: 86
Setting detail: THERAPIES SERIES
Discharge: HOME | End: 2021-11-24
Payer: MEDICARE

## 2021-11-24 PROCEDURE — 97113 AQUATIC THERAPY/EXERCISES: CPT

## 2021-11-24 NOTE — PROGRESS NOTES
Phone: Miya           Fax: [phone]                           Outpatient Physical Therapy                                                                            Daily Note    Patient: Vandana Estrella : 1932  CSN #: [id number]   Referring Practitioner:  Gemma Leblanc DO    Referral Date : 10/19/21     Date: 2021    Diagnosis: Leg weakness  Treatment Diagnosis: generalized weakness, difficulty walking    Onset Date: 10/19/21  PT Insurance Information: Medicare  Total # of Visits Approved: 12 Per Physician Order  Total # of Visits to Date: 6  No Show: 0  Canceled Appointment: 0      Pre-Treatment Pain: 010  Subjective: Pt reports feeling sore today in B calves. Pt caregiver states pt was more tired following last visit. Exercises:  Exercise 1: HEP: sink ex-- without squats  Exercise 2: >> aquatic therapy program for LE strengthening and endurance  Exercise 3: AQ; water walk x4 laps fwd, lateral, retro in shallow and deep - occasionally without UE support  Exercise 4: AQ:  Sink ex x10  Exercise 5: AQ:  deep water hanging  Exercise 6: AQ:  sit to stand x10  Exercise 7: AQ: blue bar pull down to waist; pink board push/pull x10 - both with posterior support. Exercise 8: AQ: jet massage to decrease tone and pain in low back with LAQ and march x10    Assessment  Assessment: Decreased reps with sink ex and core stability ex d/t increased soreness from last visit. Pt reports he feels more shakey today with exercises. Will continue to progress aquatics to increase strength/ROM in an unloaded environment. Activity Tolerance  Activity Tolerance: Patient Tolerated treatment well    Patient Education  Ex technique  Pt verbalized/demonstrated good understanding:     [x] Yes         [] No, pt required further clarification.        Post Treatment Pain: 0 /10      Plan  Times per week: 2  Plan weeks: 4-6      Goals  (Total # of Visits to Date: 6)      Short term goals  Time Frame for Short term goals: 3 weeks  Short term goal 1: Pt will initiate HEP. - met  Short term goal 2: Pt will initiate aquatic therapy program to assist with improving LE strengthening and endurance. -met    Long term goals  Time Frame for Long term goals : 6 weeks  Long term goal 1: Pt will be independent and compliant with HEP. Long term goal 2: Pt will safely perform TUG with LRAD in </= 13 seconds to decrease risk of falls. Long term goal 3: Pt will demonstrate ability to maintain tandem stance for >/= 10 seconds to improve balance with NBOS. Long term goal 4: Pt will tolerate >/= 40-45mins ther ex/act with minimal rest breaks to improve endurance and activity tolerance. Long term goal 5: Pt will safely ambulate household distances with LRAD to improve functional ambulation throughout house.     Minutes Tracking:  Time In: 1505  Time Out: Dave White  Minutes: [address], [place]     Date: 11/24/2021

## 2021-11-29 ENCOUNTER — HOSPITAL ENCOUNTER (OUTPATIENT)
Dept: PHYSICAL THERAPY | Age: 86
Setting detail: THERAPIES SERIES
Discharge: HOME | End: 2021-11-29
Payer: MEDICARE

## 2021-11-29 PROCEDURE — 97113 AQUATIC THERAPY/EXERCISES: CPT

## 2021-11-29 NOTE — PROGRESS NOTES
Phone: Miya           Fax: [phone]                           Outpatient Physical Therapy                                                                            Daily Note    Patient: Nisha Garber : 1932  CSN #: [id number]   Referring Practitioner:  Carlo Wu DO          Date: 2021    Diagnosis: Leg weakness  Treatment Diagnosis: generalized weakness, difficulty walking    Onset Date: 10/19/21  PT Insurance Information: Medicare  Total # of Visits Approved: 12 Per Physician Order  Total # of Visits to Date: 7  No Show: 0  Canceled Appointment: 0      Pre-Treatment Pain: 0 /10  Subjective: Pt caregiver states pt is more tired today, states he \"didn't do much this weekend. \"  Pt verbalized feeling more fatigued today. Exercises:  Exercise 1: HEP: sink ex-- without squats  Exercise 2: >> aquatic therapy program for LE strengthening and endurance  Exercise 3: AQ; water walk x4 laps fwd, lateral, retro in shallow and deep - occasionally without UE support  Exercise 8: AQ: jet massage to decrease tone and pain in low back with LAQ and march x1    Assessment  Assessment: pt was quick to fatigue today with few laps of ambulation in the deeper water as compared to his previous appts. Pt with noted increased SOB as well. Pt was limited with exercises today d/t this. WIll progress aquatics to increase strength/ROM in an unloaded environment. Activity Tolerance  Activity Tolerance: Patient Tolerated treatment well    Patient Education  Ex technique, progress thus far  Pt verbalized/demonstrated good understanding:     [x] Yes         [] No, pt required further clarification. Post Treatment Pain:  0/10      Plan  Times per week: 2  Plan weeks: 4-6      Goals  (Total # of Visits to Date: 7)      Short term goals  Time Frame for Short term goals: 3 weeks  Short term goal 1: Pt will initiate HEP. - met  Short term goal 2: Pt will initiate aquatic therapy program to assist with improving LE strengthening and endurance. -met    Long term goals  Time Frame for Long term goals : 6 weeks  Long term goal 1: Pt will be independent and compliant with HEP. Long term goal 2: Pt will safely perform TUG with LRAD in </= 13 seconds to decrease risk of falls. Long term goal 3: Pt will demonstrate ability to maintain tandem stance for >/= 10 seconds to improve balance with NBOS. Long term goal 4: Pt will tolerate >/= 40-45mins ther ex/act with minimal rest breaks to improve endurance and activity tolerance. Long term goal 5: Pt will safely ambulate household distances with LRAD to improve functional ambulation throughout house.     Minutes Tracking:  Time In: 4365  Time Out: 1421 [facility]  Minutes: [address], [facility] [place]     Date: 11/29/2021

## 2021-12-01 ENCOUNTER — APPOINTMENT (OUTPATIENT)
Dept: PHYSICAL THERAPY | Age: 86
End: 2021-12-01
Payer: MEDICARE

## 2021-12-06 ENCOUNTER — APPOINTMENT (OUTPATIENT)
Dept: PHYSICAL THERAPY | Age: 86
End: 2021-12-06
Payer: MEDICARE

## 2021-12-08 ENCOUNTER — HOSPITAL ENCOUNTER (OUTPATIENT)
Dept: PHYSICAL THERAPY | Age: 86
Setting detail: THERAPIES SERIES
Discharge: HOME | End: 2021-12-08
Payer: MEDICARE

## 2021-12-08 PROCEDURE — 97113 AQUATIC THERAPY/EXERCISES: CPT

## 2021-12-08 NOTE — PROGRESS NOTES
Phone: Miya           Fax: [phone]                           Outpatient Physical Therapy                                                                            Daily Note    Patient: Yue Ospina : 1932  CSN #: [id number]   Referring Practitioner:  Roma Vernon DO    Referral Date : 10/19/21     Date: 2021    Diagnosis: Leg weakness  Treatment Diagnosis: generalized weakness, difficulty walking    Onset Date: 10/19/21  PT Insurance Information: Medicare  Total # of Visits Approved: 12 Per Physician Order  Total # of Visits to Date: 8  No Show: 0  Canceled Appointment: 0      Pre-Treatment Pain:  0/10  Subjective: Pt caregiver states pt is about the same. No concerns at this time. Pt reports he's tired today. Exercises:  Exercise 1: HEP: sink ex-- without squats  Exercise 2: >> aquatic therapy program for LE strengthening and endurance  Exercise 3: AQ; water walk x4 laps fwd, lateral, retro in shallow and deep - occasionally without UE support  Exercise 4: AQ:  Sink ex x10  Exercise 9: AQ:  forward/ lateral step up x10 each deep water      Assessment  Assessment: Pt had 1-2 bouts of his R knee buckling with ambulation and exercises. Pt took at least 5 short standing RBs in water d/t fatigue and SOB. Pt educated on ambulating on more shallow side to decrease water pressure that was on his chest - noted improvement. Pt progressed to fwd/lat step ups in deep water. Will progress aquatics to increase strength/ROM in an unloaded environment. Activity Tolerance  Activity Tolerance: Patient limited by fatigue, Patient Tolerated treatment well    Patient Education  Continue HEP, aquatic rationale/progression  Pt verbalized/demonstrated good understanding:     [x] Yes         [] No, pt required further clarification.        Post Treatment Pain:  0/10      Plan  Times per week: 2  Plan weeks: 4-6      Goals  (Total # of Visits to Date: 8)      Short term goals  Time Frame for Short term goals: 3 weeks  Short term goal 1: Pt will initiate HEP. - met  Short term goal 2: Pt will initiate aquatic therapy program to assist with improving LE strengthening and endurance. -met    Long term goals  Time Frame for Long term goals : 6 weeks  Long term goal 1: Pt will be independent and compliant with HEP. Long term goal 2: Pt will safely perform TUG with LRAD in </= 13 seconds to decrease risk of falls. Long term goal 3: Pt will demonstrate ability to maintain tandem stance for >/= 10 seconds to improve balance with NBOS. Long term goal 4: Pt will tolerate >/= 40-45mins ther ex/act with minimal rest breaks to improve endurance and activity tolerance. Long term goal 5: Pt will safely ambulate household distances with LRAD to improve functional ambulation throughout house.     Minutes Tracking:  Time In: 9029  Time Out: 932 [address]  Minutes: David 1, PTA     Date: 12/8/2021

## 2021-12-10 ENCOUNTER — APPOINTMENT (OUTPATIENT)
Dept: PHYSICAL THERAPY | Age: 86
End: 2021-12-10
Payer: MEDICARE

## 2021-12-13 ENCOUNTER — HOSPITAL ENCOUNTER (OUTPATIENT)
Dept: PHYSICAL THERAPY | Age: 86
Setting detail: THERAPIES SERIES
Discharge: HOME | End: 2021-12-13
Payer: MEDICARE

## 2021-12-13 PROCEDURE — 97113 AQUATIC THERAPY/EXERCISES: CPT

## 2021-12-13 NOTE — PROGRESS NOTES
Phone: Miya           Fax: [phone]                           Outpatient Physical Therapy                                                                            Daily Note    Patient: Lisa Puentes : 1932  CSN #: [id number]   Referring Practitioner:  Ramana Cano DO    Referral Date : 10/19/21     Date: 2021    Diagnosis: Leg weakness  Treatment Diagnosis: generalized weakness, difficulty walking    Onset Date: 10/19/21  PT Insurance Information: Medicare  Total # of Visits Approved: 12 Per Physician Order  Total # of Visits to Date: 9  No Show: 0  Canceled Appointment: 0      Pre-Treatment Pain:  0/10  Subjective: Pt caregiver reports no concerns and she states pt \"sat on his butt most of the weekend. \"    Exercises:  Exercise 1: HEP: sink ex-- without squats  Exercise 2: >> aquatic therapy program for LE strengthening and endurance  Exercise 3: AQ; water walk x4 laps fwd, lateral, retro in shallow and deep - occasionally without UE support  Exercise 4: AQ:  Sink ex x10  Exercise 6: AQ:  sit to stand x15  Exercise 7: AQ: blue bar pull down to waist; pink board push/pull x10 - both with posterior support. Exercise 8: AQ: jet massage to decrease tone and pain in low back with LAQ and march x10  Exercise 9: AQ:  forward/ lateral step up x10 each deep water    Assessment  Assessment: Pt with better tolerance to aquatic ex today with one short RB taken. Pt ambulates t/o pool with B flexed knees and trunk, when cued to correct , B knees buckle. Pt with poor balance when exiting pool and requires CGA for safety with transfer to w/c. Will continue with aquatics to increase strength/ROM in an unloaded environment.     Activity Tolerance  Activity Tolerance: Patient Tolerated treatment well    Patient Education  Ex technique and safety with transfers  Pt verbalized/demonstrated good understanding:     [x] Yes         [] No, pt required further clarification. Post Treatment Pain:  0/10      Plan  Times per week: 2  Plan weeks: 4-6      Goals  (Total # of Visits to Date: 5)      Short term goals  Time Frame for Short term goals: 3 weeks  Short term goal 1: Pt will initiate HEP. - met  Short term goal 2: Pt will initiate aquatic therapy program to assist with improving LE strengthening and endurance. -met    Long term goals  Time Frame for Long term goals : 6 weeks  Long term goal 1: Pt will be independent and compliant with HEP. Long term goal 2: Pt will safely perform TUG with LRAD in </= 13 seconds to decrease risk of falls. Long term goal 3: Pt will demonstrate ability to maintain tandem stance for >/= 10 seconds to improve balance with NBOS. Long term goal 4: Pt will tolerate >/= 40-45mins ther ex/act with minimal rest breaks to improve endurance and activity tolerance. Long term goal 5: Pt will safely ambulate household distances with LRAD to improve functional ambulation throughout house.     Minutes Tracking:  Time In: 0845  Time Out: 8[address]  Minutes: [address], PTA     Date: 12/13/2021

## 2021-12-17 ENCOUNTER — HOSPITAL ENCOUNTER (OUTPATIENT)
Dept: PHYSICAL THERAPY | Age: 86
Setting detail: THERAPIES SERIES
Discharge: HOME | End: 2021-12-17
Payer: MEDICARE

## 2021-12-17 PROCEDURE — 97113 AQUATIC THERAPY/EXERCISES: CPT

## 2021-12-20 ENCOUNTER — HOSPITAL ENCOUNTER (OUTPATIENT)
Dept: PHYSICAL THERAPY | Age: 86
Setting detail: THERAPIES SERIES
Discharge: HOME | End: 2021-12-20
Payer: MEDICARE

## 2021-12-20 PROCEDURE — 97113 AQUATIC THERAPY/EXERCISES: CPT

## 2021-12-20 NOTE — PROGRESS NOTES
Phone: Miya           Fax: [phone]                           Outpatient Physical Therapy                                                                            Daily Note    Patient: Krista Rodriguez : 1932  CSN #: [id number]   Referring Practitioner:  Shailesh Cooper DO    Referral Date : 10/19/21     Date: 2021       Treatment Diagnosis: generalized weakness, difficulty walking    Onset Date: 10/19/21  PT Insurance Information: Medicare  Total # of Visits Approved: 12 Per Physician Order  Total # of Visits to Date: 11  No Show: 0  Canceled Appointment: 0      Pre-Treatment Pain: 0 /10  Subjective: Pt reports feeling more fatigued today but okay overall. pt caregiver reports no change or concerns. Exercises:  Exercise 1: HEP: sink ex-- without squats  Exercise 2: >> aquatic therapy program for LE strengthening and endurance  Exercise 3: AQ; water walk x4 laps fwd, lateral, retro in shallow- occasionally without UE support  Exercise 4: AQ:  Sink ex x15  Exercise 7: AQ: blue bar pull down to waist; pink board push/pull x10 - both with posterior support. Assessment  Assessment: Pt required more frequent but short RBs t/o tx today d/t fatigue. Pt reports feeling more fatigued today. Pt continues to show improved posture with therex in shallow side of pool. Will progress aquatics to increase strength/ROM in an unloaded environment. Activity Tolerance  Activity Tolerance: Patient limited by fatigue,Patient Tolerated treatment well    Patient Education  Ex technique and HEP  Pt verbalized/demonstrated good understanding:     [x] Yes         [] No, pt required further clarification. Post Treatment Pain: 0 /10      Plan  Times per week: 2  Plan weeks: 4-6      Goals  (Total # of Visits to Date: 6)      Short term goals  Time Frame for Short term goals: 3 weeks  Short term goal 1: Pt will initiate HEP. - met  Short term goal 2: Pt will initiate aquatic therapy program to assist with improving LE strengthening and endurance. -met    Long term goals  Time Frame for Long term goals : 6 weeks  Long term goal 1: Pt will be independent and compliant with HEP. Long term goal 2: Pt will safely perform TUG with LRAD in </= 13 seconds to decrease risk of falls. Long term goal 3: Pt will demonstrate ability to maintain tandem stance for >/= 10 seconds to improve balance with NBOS. Long term goal 4: Pt will tolerate >/= 40-45mins ther ex/act with minimal rest breaks to improve endurance and activity tolerance. Long term goal 5: Pt will safely ambulate household distances with LRAD to improve functional ambulation throughout house.     Minutes Tracking:  Time In: 7994  Time Out: 9931  Minutes: SALMA Townsend 1, PTA     Date: 12/20/2021

## 2021-12-22 ENCOUNTER — HOSPITAL ENCOUNTER (OUTPATIENT)
Dept: PHYSICAL THERAPY | Age: 86
Setting detail: THERAPIES SERIES
Discharge: HOME | End: 2021-12-22
Payer: MEDICARE

## 2021-12-22 PROCEDURE — 97113 AQUATIC THERAPY/EXERCISES: CPT

## 2021-12-22 NOTE — PROGRESS NOTES
Phone: Miya           Fax: [phone]                           Outpatient Physical Therapy                                                                            Daily Note    Patient: Kailey Rodriguez : 1932  CSN #: [id number]   Referring Practitioner:  Roma Vernon DO    Referral Date : 10/19/21     Date: 2021       Treatment Diagnosis: generalized weakness, difficulty walking    Onset Date: 10/19/21  PT Insurance Information: Medicare  Total # of Visits Approved: 12 Per Physician Order  Total # of Visits to Date: 12  No Show: 0  Canceled Appointment: 0      Pre-Treatment Pain:  0/10  Subjective: Pt arrives 20  min early, caregiver states pt wanted to swim and sit on jets prior to PT tx. Pt reports no issues/concerns. Exercises:  Exercise 1: HEP: sink ex-- without squats  Exercise 2: >> aquatic therapy program for LE strengthening and endurance  Exercise 3: AQ; water walk x4 laps fwd, lateral, retro in shallow- occasionally without UE support (3 laps with 5# ankle weights donned per pt request)  Exercise 4: AQ:  Sink ex x15  Exercise 6: AQ:  sit to stand x15  Exercise 8: AQ: jet massage to decrease tone and pain in low back with LAQ and march x10    Assessment  Assessment: Pt requested to wear 5# ankle weights while ambulating, he tolerated 3 laps before fatigue. Pt was quick to fatigue today, he tolerated ~15 min of exercise before needing RB. Will continue with aquatics to increase strength/ROM in an unloaded environment. Activity Tolerance  Activity Tolerance: Patient limited by fatigue,Patient Tolerated treatment well    Patient Education  Ex technique  Pt verbalized/demonstrated good understanding:     [x] Yes         [] No, pt required further clarification.        Post Treatment Pain:  0/10      Plan  Times per week: 2  Plan weeks: 4-6      Goals  (Total # of Visits to Date: 15)      Short term goals  Time Frame for Short term goals: 3 weeks  Short term goal 1: Pt will initiate HEP. - met  Short term goal 2: Pt will initiate aquatic therapy program to assist with improving LE strengthening and endurance. -met    Long term goals  Time Frame for Long term goals : 6 weeks  Long term goal 1: Pt will be independent and compliant with HEP. Long term goal 2: Pt will safely perform TUG with LRAD in </= 13 seconds to decrease risk of falls. Long term goal 3: Pt will demonstrate ability to maintain tandem stance for >/= 10 seconds to improve balance with NBOS. Long term goal 4: Pt will tolerate >/= 40-45mins ther ex/act with minimal rest breaks to improve endurance and activity tolerance. Long term goal 5: Pt will safely ambulate household distances with LRAD to improve functional ambulation throughout house.     Minutes Tracking:  Time In: 2272  Time Out: [address]  Minutes: 23 Isela Mc PTA     Date: 12/22/2021

## 2021-12-27 ENCOUNTER — HOSPITAL ENCOUNTER (OUTPATIENT)
Dept: PHYSICAL THERAPY | Age: 86
Setting detail: THERAPIES SERIES
Discharge: HOME | End: 2021-12-27
Payer: MEDICARE

## 2021-12-27 PROCEDURE — 97113 AQUATIC THERAPY/EXERCISES: CPT

## 2021-12-27 NOTE — PROGRESS NOTES
Phone: Miya           Fax: [phone]                           Outpatient Physical Therapy                                                                            Daily Note    Patient: Kailey Rodriguez : 1932  CSN #: [id number]   Referring Practitioner:  Roma Vernon DO    Referral Date : 10/19/21     Date: 2021    Diagnosis: Leg weakness  Treatment Diagnosis: generalized weakness, difficulty walking    Onset Date: 10/19/21  PT Insurance Information: Medicare  Total # of Visits Approved: 12 Per Physician Order  Total # of Visits to Date: 13  No Show: 0  Canceled Appointment: 0      Pre-Treatment Pain:  0/10  Subjective: Pt caregiver reports pt continues to do well with ADLs. Exercises:  Exercise 1: HEP: sink ex-- without squats  Exercise 2: >> aquatic therapy program for LE strengthening and endurance  Exercise 3: AQ; water walk x4 laps fwd, lateral, retro in shallow- occasionally without UE support; x3 laps fwd in chest deep water at end of session. Exercise 4: AQ:  Sink ex x15  Exercise 6: AQ:  sit to stand x15  Exercise 7: AQ: blue bar pull down to waist; pink board push/pull x10 - both with posterior support. Exercise 8: AQ: jet massage to decrease tone and pain in low back with LAQ and march x10  Exercise 9: AQ:  forward/ lateral step up x10 each    Assessment  Assessment: Progressed pt to shallow side of pool to continue increasing functional strength. Pt did well with fwd step ups but his R knee buckled after 6 reps. Pt requires rest breaks t/o session d/t fatigue. WIll progress aquatics for increased strength/ROM in an unloaded environment. Activity Tolerance  Activity Tolerance: Patient limited by fatigue,Patient Tolerated treatment well    Patient Education  Ex rationale  Pt verbalized/demonstrated good understanding:     [x] Yes         [] No, pt required further clarification.        Post Treatment Pain:  0/10      Plan  Times per week: 2  Plan weeks: 4-6      Goals  (Total # of Visits to Date: 15)      Short term goals  Time Frame for Short term goals: 3 weeks  Short term goal 1: Pt will initiate HEP. - met  Short term goal 2: Pt will initiate aquatic therapy program to assist with improving LE strengthening and endurance. -met    Long term goals  Time Frame for Long term goals : 6 weeks  Long term goal 1: Pt will be independent and compliant with HEP. Long term goal 2: Pt will safely perform TUG with LRAD in </= 13 seconds to decrease risk of falls. Long term goal 3: Pt will demonstrate ability to maintain tandem stance for >/= 10 seconds to improve balance with NBOS. Long term goal 4: Pt will tolerate >/= 40-45mins ther ex/act with minimal rest breaks to improve endurance and activity tolerance. Long term goal 5: Pt will safely ambulate household distances with LRAD to improve functional ambulation throughout house.     Minutes Tracking:  Time In: 1330  Time Out: [address]  Minutes: [address], PTA     Date: 12/27/2021

## 2021-12-29 ENCOUNTER — HOSPITAL ENCOUNTER (OUTPATIENT)
Dept: PHYSICAL THERAPY | Age: 86
Setting detail: THERAPIES SERIES
Discharge: HOME | End: 2021-12-29
Payer: MEDICARE

## 2021-12-29 PROCEDURE — 97113 AQUATIC THERAPY/EXERCISES: CPT

## 2021-12-29 NOTE — PROGRESS NOTES
Phone: Miya           Fax: [phone]                           Outpatient Physical Therapy                                                                            Daily Note    Patient: Chandra Lowe : 1932  CSN #: [id number]   Referring Practitioner:  Davis Condon DO    Referral Date : 10/19/21     Date: 2021    Diagnosis: Leg weakness  Treatment Diagnosis: generalized weakness, difficulty walking    Onset Date: 10/19/21  PT Insurance Information: Medicare  Total # of Visits Approved: 12 Per Physician Order  Total # of Visits to Date: 14  No Show: 0  Canceled Appointment: 0      Subjective: Pt reports no concerns today. Caregiver reports pt is able to ambulate further distances before RB is needed. Exercises:  Exercise 1: HEP: sink ex-- without squats  Exercise 2: >> aquatic therapy program for LE strengthening and endurance  Exercise 3: AQ; water walk x4 laps fwd, lateral, retro in shallow - x2 laps with blue paddles fwd. Exercise 4: AQ:  Sink ex x15  Exercise 6: AQ:  sit to stand x15  Exercise 8: AQ: jet massage to decrease tone and pain in low back with LAQ and march x10  Exercise 9: AQ:  forward/ lateral step up x10 each      Assessment  Assessment: Pt continues to do well with aquatic PT. Pt caregiver states pt ambulated from car to PT doors with SPC (~60 ft). BLE strength grossly 4/5. WIll progress aquatics to increase strength/ROM in an unloaded environment. Activity Tolerance  Activity Tolerance: Patient Tolerated treatment well    Patient Education    Ex technique and rationale, postural awareness with exercises. \Pt verbalized/demonstrated good understanding:     [x] Yes         [] No, pt required further clarification. Plan  Times per week: 2  Plan weeks: 4-6      Goals  (Total # of Visits to Date: 15)      Short term goals  Time Frame for Short term goals: 3 weeks  Short term goal 1: Pt will initiate HEP. - met  Short term goal 2: Pt will initiate aquatic therapy program to assist with improving LE strengthening and endurance. -met    Long term goals  Time Frame for Long term goals : 6 weeks  Long term goal 1: Pt will be independent and compliant with HEP. Long term goal 2: Pt will safely perform TUG with LRAD in </= 13 seconds to decrease risk of falls. Long term goal 3: Pt will demonstrate ability to maintain tandem stance for >/= 10 seconds to improve balance with NBOS. Long term goal 4: Pt will tolerate >/= 40-45mins ther ex/act with minimal rest breaks to improve endurance and activity tolerance. Long term goal 5: Pt will safely ambulate household distances with LRAD to improve functional ambulation throughout house.     Minutes Tracking:  Time In: [facility]  Time Out: [address]  Minutes: [address] PTA     Date: 12/29/2021

## 2022-01-03 ENCOUNTER — HOSPITAL ENCOUNTER (OUTPATIENT)
Dept: PHYSICAL THERAPY | Age: 87
Setting detail: THERAPIES SERIES
Discharge: HOME | End: 2022-01-03
Payer: MEDICARE

## 2022-01-03 PROCEDURE — 97113 AQUATIC THERAPY/EXERCISES: CPT

## 2022-01-03 NOTE — PROGRESS NOTES
Phone: Miya           Fax: [phone]                           Outpatient Physical Therapy                                                                            Daily Note    Patient: Faina Hernandez : 1932  CSN #: [id number]   Referring Practitioner:  Emily Merritt DO    Referral Date : 10/19/21     Date: 1/3/2022    Diagnosis: Leg weakness  Treatment Diagnosis: generalized weakness, difficulty walking    Onset Date: 10/19/21  PT Insurance Information: Medicare  Total # of Visits Approved: 12 Per Physician Order  Total # of Visits to Date: 15  No Show: 0  Canceled Appointment: 0      Pre-Treatment Pain:  Not stated  Subjective: Pt ambulated from car into the pool today (~175 ft)  with use of his walking stick, erect posture. Pt reports he feels fatigued after walking into hospital.    Exercises:  Exercise 1: HEP: sink ex-- without squats  Exercise 3: AQ; water walk x4 laps fwd, lateral, retro in shallow -  Exercise 8: AQ: jet massage to decrease tone and pain in low back with LAQ and march x10    Assessment  Assessment: Pt more fatigued today, tolerating 15 min of aquatic exercise with short standing RB throughout. Pt visibly fatigued when exiting pool, his caregiver brought in w/c to wheel pt out to car. Will cont with aquatics for increased strength/ROM in an unloaded environment. Activity Tolerance  Activity Tolerance: Patient limited by fatigue    Patient Education  Ex technique   Pt verbalized/demonstrated good understanding:     [x] Yes         [] No, pt required further clarification. Post Treatment Pain:  Not stated       Plan  Times per week: 2  Plan weeks: 4-6      Goals  (Total # of Visits to Date: 13)      Short term goals  Time Frame for Short term goals: 3 weeks  Short term goal 1: Pt will initiate HEP. - met  Short term goal 2: Pt will initiate aquatic therapy program to assist with improving LE strengthening and endurance. -met    Long term goals  Time Frame for Long term goals : 6 weeks  Long term goal 1: Pt will be independent and compliant with HEP. Long term goal 2: Pt will safely perform TUG with LRAD in </= 13 seconds to decrease risk of falls. Long term goal 3: Pt will demonstrate ability to maintain tandem stance for >/= 10 seconds to improve balance with NBOS. Long term goal 4: Pt will tolerate >/= 40-45mins ther ex/act with minimal rest breaks to improve endurance and activity tolerance. Long term goal 5: Pt will safely ambulate household distances with LRAD to improve functional ambulation throughout house.     Minutes Tracking:  Time In: 9019  Time Out: 6320  Minutes: [address] , PTA     Date: 1/3/2022

## 2022-01-07 ENCOUNTER — HOSPITAL ENCOUNTER (OUTPATIENT)
Dept: PHYSICAL THERAPY | Age: 87
Setting detail: THERAPIES SERIES
Discharge: HOME | End: 2022-01-07
Payer: MEDICARE

## 2022-01-07 PROCEDURE — 97113 AQUATIC THERAPY/EXERCISES: CPT

## 2022-01-07 NOTE — PROGRESS NOTES
Phone: Miya           Fax: [phone]                           Outpatient Physical Therapy                                                                            Daily Note    Patient: Syl Ren : 1932  CSN #: [id number]   Referring Practitioner:  Mell Guevara DO    Referral Date : 10/19/21     Date: 2022    Diagnosis: Leg weakness  Treatment Diagnosis: generalized weakness, difficulty walking    Onset Date: 10/19/21  PT Insurance Information: Medicare  Total # of Visits Approved: 12 Per Physician Order  Total # of Visits to Date: 16  No Show: 0  Canceled Appointment: 0      Pre-Treatment Pain: 0 /10  Subjective: Pt caregiver reports they had ran arrends prior to appt. Pt reports fatigue. Exercises:  Exercise 1: HEP: sink ex-- without squats  Exercise 3: AQ; water walk x4 laps fwd, lateral, retro in shallow -  Exercise 4: AQ:  Sink ex x15  Exercise 7: AQ: blue bar pull down to waist; pink board push/pull x15 - both with posterior support. Exercise 8: AQ: jet massage to decrease tone and pain in low back with LAQ and march x1      Assessment  Assessment: Pt continues to tolerate 30 min of aquatic exercises before fatigue. Pt able to progress with repetitions today with few short, standing RBs. Pt with slight SOB during tx today. WIll progress aquatics for increased strength/ROM in an unloaded environment. Activity Tolerance  Activity Tolerance: Patient limited by fatigue    Patient Education  Ex technique and progression  Pt verbalized/demonstrated good understanding:     [x] Yes         [] No, pt required further clarification. Post Treatment Pain:  0/10      Plan  Times per week: 2  Plan weeks: 4-6      Goals  (Total # of Visits to Date: 12)      Short term goals  Time Frame for Short term goals: 3 weeks  Short term goal 1: Pt will initiate HEP. - met  Short term goal 2: Pt will initiate aquatic therapy program to assist with improving LE strengthening and endurance. -met    Long term goals  Time Frame for Long term goals : 6 weeks  Long term goal 1: Pt will be independent and compliant with HEP. Long term goal 2: Pt will safely perform TUG with LRAD in </= 13 seconds to decrease risk of falls. Long term goal 3: Pt will demonstrate ability to maintain tandem stance for >/= 10 seconds to improve balance with NBOS. Long term goal 4: Pt will tolerate >/= 40-45mins ther ex/act with minimal rest breaks to improve endurance and activity tolerance. Long term goal 5: Pt will safely ambulate household distances with LRAD to improve functional ambulation throughout house.     Minutes Tracking:  Time In: 5400  Time Out: [address]  Minutes: [address], PTA     Date: 1/7/2022

## 2022-01-10 ENCOUNTER — HOSPITAL ENCOUNTER (OUTPATIENT)
Dept: PHYSICAL THERAPY | Age: 87
Setting detail: THERAPIES SERIES
Discharge: HOME | End: 2022-01-10
Payer: MEDICARE

## 2022-01-10 PROCEDURE — 97113 AQUATIC THERAPY/EXERCISES: CPT

## 2022-01-10 NOTE — PROGRESS NOTES
Phone: Miya           Fax: [phone]                           Outpatient Physical Therapy                                                                            Daily Note    Patient: Giuliano George : 1932  CSN #: [id number]   Referring Practitioner:  Jo Ann Dorsey DO    Referral Date : 10/19/21     Date: 1/10/2022       Treatment Diagnosis: generalized weakness, difficulty walking    Onset Date: 10/19/21  PT Insurance Information: Medicare  Total # of Visits Approved: 12 Per Physician Order  Total # of Visits to Date: 17  No Show: 0  Canceled Appointment: 0      Pre-Treatment Pain:  0/10  Subjective: Pt caregiver states pt was fatigued following his last visit. Pt reports he stayed up late last night watching TV. Exercises:  Exercise 1: HEP: sink ex-- without squats  Exercise 2: >> aquatic therapy program for LE strengthening and endurance  Exercise 3: AQ; water walk x4 laps fwd, lateral, retro in deep  Exercise 4: AQ:  Sink ex x15  Exercise 9: AQ:  forward/ lateral step up x10 each -only fwd step up today  Exercise 10: AQ:  UE 90/90    Assessment  Assessment: Pt was quick to fatigue today with each exercise and took multiple standing RBs. Incorporated UE exercises in deepest portion of water to allow LE's to rest and work on trunk stability. Pt caregiver reports pt doesn't sit in his electric chair much anymore and ambulates around the house more. WIll progress aquatics for increased strength/ROM in an unloaded environment. Activity Tolerance  Activity Tolerance: Patient limited by fatigue    Patient Education  Ex technique  Pt verbalized/demonstrated good understanding:     [x] Yes         [] No, pt required further clarification.        Post Treatment Pain: 0 /10      Plan  Times per week: 2  Plan weeks: 4-6      Goals  (Total # of Visits to Date: 16)      Short term goals  Time Frame for Short term goals: 3 weeks  Short term goal 1: Pt will initiate HEP. - met  Short term goal 2: Pt will initiate aquatic therapy program to assist with improving LE strengthening and endurance. -met    Long term goals  Time Frame for Long term goals : 6 weeks  Long term goal 1: Pt will be independent and compliant with HEP. Long term goal 2: Pt will safely perform TUG with LRAD in </= 13 seconds to decrease risk of falls. Long term goal 3: Pt will demonstrate ability to maintain tandem stance for >/= 10 seconds to improve balance with NBOS. Long term goal 4: Pt will tolerate >/= 40-45mins ther ex/act with minimal rest breaks to improve endurance and activity tolerance. Long term goal 5: Pt will safely ambulate household distances with LRAD to improve functional ambulation throughout house.     Minutes Tracking:  Time In: 9899  Time Out: 2662  Minutes: [address], PTA     Date: 1/10/2022

## 2022-01-10 NOTE — PLAN OF CARE
[facility]           Phone: [phone]             Outpatient Physical Therapy  Fax: [phone]                                           Date: 2021  Patient: Jose Raul Jose : 1932 CSN #: [id number]   Referring Practitioner:  Harleen Dunlap DO Referral Date:  10/19/21       [] Plan of Care   [x] Updated Plan of Care    Dates of Service to Include: 2021 to 22    Diagnosis:  Leg weakness    Rehab (Treatment) Diagnosis:  generalized weakness, difficulty walking             Onset Date:  10/19/21    Attendance  Total # of Visits to Date: 10 No Show: 0 Canceled Appointment: 0    Assessment  Assessment: Pt has completed 10 PT visits to date. Pt reports good feedback regarding aquatic therapy program thus far. Pt demonstrates good exercise tolerance in unloaded environment. However, pt cont to require rest breaks throughout tx d/t becoming easily fatigued. Pt also demonstrating improved upright posture with ambulation in pool as well. Pt will cont to benefit from aquatic therapy program for cont progressions of strength, ROM and exercise tolerance in unloaded environment. Goals  Short term goals  Time Frame for Short term goals: 3 weeks  Short term goal 1: Pt will initiate HEP. - met  Short term goal 2: Pt will initiate aquatic therapy program to assist with improving LE strengthening and endurance. -met  Long term goals  Time Frame for Long term goals : 6 weeks  Long term goal 1: Pt will be independent and compliant with HEP. Long term goal 2: Pt will safely perform TUG with LRAD in </= 13 seconds to decrease risk of falls. Long term goal 3: Pt will demonstrate ability to maintain tandem stance for >/= 10 seconds to improve balance with NBOS. Long term goal 4: Pt will tolerate >/= 40-45mins ther ex/act with minimal rest breaks to improve endurance and activity tolerance.   Long term goal 5: Pt will safely ambulate household distances with LRAD to improve functional ambulation throughout house.      Prognosis  Prognosis: Good    Treatment Plan   Times per week: 2  Plan weeks: 4-6  [] HP/CP      [] Electrical Stim   [x] Therapeutic Exercise      [] Gait Training  [x] Aquatics   [] Ultrasound         [x] Patient Education/HEP   [] Manual Therapy  [] Traction    [] Neuro-jolie        [] Soft Tissue Mobs            [] Home TENS  [] Iontophoresis    [] Orthotic casting/fitting      [] Dry Needling             Electronically signed by: Rios Govea PT, DPT    Date: 12/17/2021      ______________________________________ Date: 12/17/2021   Physician Signature

## 2022-01-14 ENCOUNTER — HOSPITAL ENCOUNTER (OUTPATIENT)
Dept: PHYSICAL THERAPY | Age: 87
Setting detail: THERAPIES SERIES
Discharge: HOME | End: 2022-01-14
Payer: MEDICARE

## 2022-01-14 PROCEDURE — 97113 AQUATIC THERAPY/EXERCISES: CPT

## 2022-01-14 NOTE — PROGRESS NOTES
Phone: Miya           Fax: [phone]                           Outpatient Physical Therapy                                                                            Daily Note    Patient: Usama Sanchez : 1932  CSN #: [id number]   Referring Practitioner:  Ethan Posada DO    Referral Date : 10/19/21     Date: 2022    Diagnosis: Leg weakness  Treatment Diagnosis: generalized weakness, difficulty walking    Onset Date: 10/19/21  PT Insurance Information: Medicare  Total # of Visits Approved: 24 Per Physician Order  Total # of Visits to Date: 18  No Show: 0  Canceled Appointment: 0      Pre-Treatment Pain: 0 /10  Subjective: Pt reports feeling tired today. caregiver reports no new concerns. Exercises:  Exercise 1: HEP: sink ex-- without squats  Exercise 3: AQ; water walk x4 laps fwd, lateral, retro in shallow  Exercise 4: AQ:  Sink ex x15  Exercise 8: AQ: jet massage to decrease tone and pain in low back with LAQ and march x10  Exercise 9: AQ:  forward/ lateral step up x10 each    Assessment  Assessment: Pt takes quick standing RBs frequently today during ambulation on shallow side of pool. Pt reports he was cold and wanted to submerge himself in the water. Caregiver states pt ambulates more around his home with use of his walking stick and seems to be more stable. Will progress aquatics for increased strength/ROM in an unloaded environment. Activity Tolerance  Activity Tolerance: Patient limited by fatigue    Patient Education  Exercise technique, continue HEP  Pt verbalized/demonstrated good understanding:     [x] Yes         [] No, pt required further clarification. Post Treatment Pain: 0 /10      Plan  Times per week: 2  Plan weeks: 4-6      Goals  (Total # of Visits to Date: 25)      Short term goals  Time Frame for Short term goals: 3 weeks  Short term goal 1: Pt will initiate HEP. - met  Short term goal 2: Pt will initiate aquatic therapy program to assist with improving LE strengthening and endurance. -met    Long term goals  Time Frame for Long term goals : 6 weeks  Long term goal 1: Pt will be independent and compliant with HEP. Long term goal 2: Pt will safely perform TUG with LRAD in </= 13 seconds to decrease risk of falls. Long term goal 3: Pt will demonstrate ability to maintain tandem stance for >/= 10 seconds to improve balance with NBOS.   Long term goal 4: Pt will tolerate >/= 40-45mins ther ex/act with minimal rest breaks to improve endurance and activity tolerance. -progressing  Long term goal 5: Pt will safely ambulate household distances with LRAD to improve functional ambulation throughout house. -met    Minutes Tracking:  Time In: 0946  Time Out: [address]  Minutes: ELPIDIO Maier     Date: 1/14/2022

## 2022-01-15 NOTE — PATIENT INSTRUCTIONS
SURVEY:    You may be receiving a survey from "[employer] regarding your visit today. Please complete the survey to enable us to provide the highest quality of care to you and your family. If you cannot score us a very good on any question, please call the office to discuss how we could of made your experience a very good one. Thank you. fall

## 2022-01-19 ENCOUNTER — HOSPITAL ENCOUNTER (OUTPATIENT)
Dept: PHYSICAL THERAPY | Age: 87
Setting detail: THERAPIES SERIES
Discharge: HOME | End: 2022-01-19
Payer: MEDICARE

## 2022-01-19 PROCEDURE — 97113 AQUATIC THERAPY/EXERCISES: CPT

## 2022-01-19 NOTE — PROGRESS NOTES
Phone: Miya           Fax: [phone]                           Outpatient Physical Therapy                                                                            Daily Note    Patient: Dash Coy : 1932  CSN #: [id number]   Referring Practitioner:  Sam Mcdonnell DO    Referral Date : 10/19/21     Date: 2022    Diagnosis: Leg weakness  Treatment Diagnosis: generalized weakness, difficulty walking    Onset Date: 10/19/21  PT Insurance Information: Medicare  Total # of Visits Approved: 24 Per Physician Order  Total # of Visits to Date: 23  No Show: 0  Canceled Appointment: 0      Pre-Treatment Pain:  Not stated  Subjective: Pt reports his \"legs aren't worth a damn today. \"  Pt caregiver reports that pt had more trouble standing to get out of truck today. Caregiver reports pt will have occasional days like these. Exercises:  Exercise 1: HEP: sink ex-- without squats  Exercise 2: >> aquatic therapy program for LE strengthening and endurance  Exercise 3: AQ; water walk x4 laps fwd, lateral, retro in shallow  Exercise 4: AQ:  Sink ex x15  Exercise 6: AQ:  sit to stand x15  Exercise 8: AQ: jet massage to decrease tone and pain in low back with LAQ and march x10    Assessment  Assessment: Increased RBs taken today d/t fatigue. Pt completed most of tx in deep water today. He had more difficulty with standing tall in waist level water, and was flexed forward with exercises. Will continue with aquatics for increased strength/ROM in an unloaded environment. Activity Tolerance  Activity Tolerance: Patient limited by fatigue    Patient Education  Ex technique, rest breaks  Pt verbalized/demonstrated good understanding:     [x] Yes         [] No, pt required further clarification.        Post Treatment Pain:  Not stated      Plan  Times per week: 2  Plan weeks: 4-6      Goals  (Total # of Visits to Date: 23)      Short term goals  Time Frame for Short term goals: 3 weeks  Short term goal 1: Pt will initiate HEP. - met  Short term goal 2: Pt will initiate aquatic therapy program to assist with improving LE strengthening and endurance. -met    Long term goals  Time Frame for Long term goals : 6 weeks  Long term goal 1: Pt will be independent and compliant with HEP. Long term goal 2: Pt will safely perform TUG with LRAD in </= 13 seconds to decrease risk of falls. Long term goal 3: Pt will demonstrate ability to maintain tandem stance for >/= 10 seconds to improve balance with NBOS.   Long term goal 4: Pt will tolerate >/= 40-45mins ther ex/act with minimal rest breaks to improve endurance and activity tolerance. -progressing  Long term goal 5: Pt will safely ambulate household distances with LRAD to improve functional ambulation throughout house. -met    Minutes Tracking:  Time In: 7370  Time Out: Conor Alarcon 984  Minutes: Aditya 60, PTA     Date: 1/19/2022

## 2022-01-21 ENCOUNTER — HOSPITAL ENCOUNTER (OUTPATIENT)
Dept: PHYSICAL THERAPY | Age: 87
Setting detail: THERAPIES SERIES
Discharge: HOME | End: 2022-01-21
Payer: MEDICARE

## 2022-01-21 PROCEDURE — 97113 AQUATIC THERAPY/EXERCISES: CPT

## 2022-01-21 NOTE — PROGRESS NOTES
Phone: Miya           Fax: [phone]                           Outpatient Physical Therapy                                                                            Daily Note    Patient: Minnie Isbell : 1932  CSN #: [id number]   Referring Practitioner:  Ashly Terry DO    Referral Date : 10/19/21     Date: 2022    Diagnosis: Leg weakness  Treatment Diagnosis: generalized weakness, difficulty walking    Onset Date: 10/19/21  PT Insurance Information: Medicare  Total # of Visits Approved: 24 Per Physician Order  Total # of Visits to Date: 20  No Show: 0  Canceled Appointment: 0      Pre-Treatment Pain:  5/10  Subjective: Pt states his legs are hurting. Caregiver states pt didn't want to come today, but she was able to encourage him. Caregiver states pt stated \"I think I just want to be done\" with aquatic PT, but she states that they'll try once a week. Exercises:  Exercise 1: HEP: sink ex-- without squats  Exercise 2: >> aquatic therapy program for LE strengthening and endurance  Exercise 3: AQ; water walk x4 laps fwd, lateral, retro in shallow  Exercise 5: AQ:  deep water hanging  Exercise 8: AQ: jet massage to decrease tone and pain in low back with LAQ and march x10      Assessment  Assessment: Pt reports increased soreness throughout B calves. He was more fatigued in general today and stated his legs ached. Decreased intensity of ex to avoid increasing soreness. WIll progress aquatics to increase strength/ROM in an unloaded environment. Activity Tolerance  Activity Tolerance: Patient limited by fatigue    Patient Education  HEP  Pt verbalized/demonstrated good understanding:     [x] Yes         [] No, pt required further clarification.        Post Treatment Pain:  5/10      Plan  Times per week: 2  Plan weeks: 4-6      Goals  (Total # of Visits to Date: 21)      Short term goals  Time Frame for Short term goals: 3 weeks  Short term goal 1: Pt will initiate HEP. - met  Short term goal 2: Pt will initiate aquatic therapy program to assist with improving LE strengthening and endurance. -met    Long term goals  Time Frame for Long term goals : 6 weeks  Long term goal 1: Pt will be independent and compliant with HEP. Long term goal 2: Pt will safely perform TUG with LRAD in </= 13 seconds to decrease risk of falls. Long term goal 3: Pt will demonstrate ability to maintain tandem stance for >/= 10 seconds to improve balance with NBOS.   Long term goal 4: Pt will tolerate >/= 40-45mins ther ex/act with minimal rest breaks to improve endurance and activity tolerance. -progressing  Long term goal 5: Pt will safely ambulate household distances with LRAD to improve functional ambulation throughout house. -met    Minutes Tracking:  Time In: Erich Robles 79  Time Out: Yojana Anderson 72  Minutes: [address], PTA     Date: 1/21/2022

## 2022-01-26 ENCOUNTER — HOSPITAL ENCOUNTER (OUTPATIENT)
Dept: PHYSICAL THERAPY | Age: 87
Setting detail: THERAPIES SERIES
Discharge: HOME | End: 2022-01-26
Payer: MEDICARE

## 2022-01-26 PROCEDURE — 97113 AQUATIC THERAPY/EXERCISES: CPT

## 2022-01-26 NOTE — PROGRESS NOTES
Phone: Miya           Fax: [phone]                           Outpatient Physical Therapy                                                                            Daily Note    Patient: Shahida Flores : 1932  CSN #: [id number]   Referring Practitioner:  Manda Wilcox DO    Referral Date : 10/19/21     Date: 2022    Diagnosis: Leg weakness  Treatment Diagnosis: generalized weakness, difficulty walking    Onset Date: 10/19/21  PT Insurance Information: Medicare  Total # of Visits Approved: 24 Per Physician Order  Total # of Visits to Date: 21  No Show: 0  Canceled Appointment: 0      Pre-Treatment Pain:  0/10  Subjective: Pt feeling better today. Pt caregiver reports pt has walked more the last week and has been feeling better. Exercises:  Exercise 1: HEP: sink ex-- without squats  Exercise 2: >> aquatic therapy program for LE strengthening and endurance  Exercise 3: AQ; water walk x4 laps fwd, lateral, retro in shallow  Exercise 4: AQ:  Sink ex x15  Exercise 6: AQ:  sit to stand x15  Exercise 7: AQ: blue bar pull down to waist; pink board push/pull x15 - both with posterior support. Exercise 8: AQ: jet massage to decrease tone and pain in low back with LAQ and march x10    Assessment  Assessment: Pt was able to resume increased intensity exercises today. He was able to complete tx today with 1-2 short duration rest breaks. Pt was able to tolerate 40 min of aquatic exercises today. Will contiue with aquatics for increased strength/ROM in an unloaded environment. Activity Tolerance  Activity Tolerance: Patient Tolerated treatment well    Patient Education  Ex technique and rationale  Pt verbalized/demonstrated good understanding:     [x] Yes         [] No, pt required further clarification.        Post Treatment Pain:  Not stated    Plan  Times per week: 2  Plan weeks: 4-6      Goals  (Total # of Visits to Date: 24)      Short term goals  Time Frame for Short term goals: 3 weeks  Short term goal 1: Pt will initiate HEP. - met  Short term goal 2: Pt will initiate aquatic therapy program to assist with improving LE strengthening and endurance. -met    Long term goals  Time Frame for Long term goals : 6 weeks  Long term goal 1: Pt will be independent and compliant with HEP. Long term goal 2: Pt will safely perform TUG with LRAD in </= 13 seconds to decrease risk of falls. Long term goal 3: Pt will demonstrate ability to maintain tandem stance for >/= 10 seconds to improve balance with NBOS.   Long term goal 4: Pt will tolerate >/= 40-45mins ther ex/act with minimal rest breaks to improve endurance and activity tolerance. -progressing  Long term goal 5: Pt will safely ambulate household distances with LRAD to improve functional ambulation throughout house. -met    Minutes Tracking:  Time In: 1766  Time Out: Arelyret 59  Minutes: [address], PTA     Date: 1/26/2022

## 2022-01-28 ENCOUNTER — HOSPITAL ENCOUNTER (OUTPATIENT)
Dept: PHYSICAL THERAPY | Age: 87
Setting detail: THERAPIES SERIES
Discharge: HOME | End: 2022-01-28
Payer: MEDICARE

## 2022-01-28 PROCEDURE — 97113 AQUATIC THERAPY/EXERCISES: CPT

## 2022-01-28 NOTE — PROGRESS NOTES
Phone: Miya           Fax: [phone]                           Outpatient Physical Therapy                                                                            Daily Note    Patient: Adarsh Caldera : 1932  CSN #: [id number]   Referring Practitioner:  Jen Rivera DO    Referral Date : 10/19/21     Date: 2022    Diagnosis: Leg weakness  Treatment Diagnosis: generalized weakness, difficulty walking    Onset Date: 10/19/21  PT Insurance Information: Medicare  Total # of Visits Approved: 24 Per Physician Order  Total # of Visits to Date: 22  No Show: 0  Canceled Appointment: 0      Pre-Treatment Pain: not stated  Subjective: Caregiver reports pt ambulated without a cane for ~30 ft. No concerns reported. Exercises:  Exercise 1: HEP: sink ex-- without squats  Exercise 2: >> aquatic therapy program for LE strengthening and endurance  Exercise 3: AQ; water walk x4 laps fwd, lateral, retro in shallow  Exercise 4: AQ:  Sink ex x15  Exercise 6: AQ:  sit to stand x15  Exercise 7: AQ: blue bar pull down to waist; pink board push/pull x15 - both with posterior support. Exercise 8: AQ: jet massage to decrease tone and pain in low back with LAQ and march x10    Assessment  Assessment: Continued with current program with pt reporting increased pain in BLE's with continuous ambulation on shallow side of pool. Pt required more frequent, short RBs with therex d/t increased fatigue. Encouraged to increase activity at home. WIll continue with aquatics for increased strength/ROM in an unloaded environment. Activity Tolerance  Activity Tolerance: Patient Tolerated treatment well    Patient Education  Ex technique, HEP  Pt verbalized/demonstrated good understanding:     [x] Yes         [] No, pt required further clarification.        Post Treatment Pain:  Not stated      Plan  Times per week: 2  Plan weeks: 4-6      Goals  (Total # of Visits to Date: 25)      Short

## 2022-02-02 ENCOUNTER — HOSPITAL ENCOUNTER (OUTPATIENT)
Dept: PHYSICAL THERAPY | Age: 87
Setting detail: THERAPIES SERIES
Discharge: HOME | End: 2022-02-02
Payer: MEDICARE

## 2022-02-02 NOTE — PROGRESS NOTES
[facility]  Inpatient/Observation/Outpatient Rehabilitation    Date: 2022  Patient Name: Julee Flores       [] Inpatient Acute/Observation       [x]  Outpatient  : 1932       [] Pt no showed for scheduled appointment    [] Pt refused/declined therapy at this time due to:           [x] Pt cancelled due to:  [] No Reason Given   [] Sick/ill   [x] Other: Weather        Dondarylaria Ariel Date: 2022

## 2022-02-04 ENCOUNTER — APPOINTMENT (OUTPATIENT)
Dept: PHYSICAL THERAPY | Age: 87
End: 2022-02-04
Payer: MEDICARE

## 2022-02-07 ENCOUNTER — HOSPITAL ENCOUNTER (OUTPATIENT)
Dept: PHYSICAL THERAPY | Age: 87
Setting detail: THERAPIES SERIES
Discharge: HOME | End: 2022-02-07
Payer: MEDICARE

## 2022-02-07 PROCEDURE — 97113 AQUATIC THERAPY/EXERCISES: CPT

## 2022-02-07 NOTE — PROGRESS NOTES
Phone: Miya           Fax: [phone]                           Outpatient Physical Therapy                                                                            Daily Note    Patient: Janine So : 1932  CSN #: [id number]   Referring Practitioner:  Nicole Silva DO    Referral Date : 10/19/21     Date: 2022    Diagnosis: Leg weakness  Treatment Diagnosis: generalized weakness, difficulty walking    Onset Date: 10/19/21  PT Insurance Information: Medicare  Total # of Visits Approved: 24 Per Physician Order  Total # of Visits to Date: 23  No Show: 0  Canceled Appointment: 1      Pre-Treatment Pain:  0/10  Subjective: Pt states he hasn't done much the last week since he \"was snowed in.\"  Caregiver reports no concerns. Exercises:  Exercise 1: HEP: sink ex-- without squats  Exercise 2: >> aquatic therapy program for LE strengthening and endurance  Exercise 3: AQ; water walk x2 laps fwd, lateral, retro in shallow;  x2 in the deep  Exercise 4: AQ:  Sink ex x15  Exercise 8: AQ: jet massage to decrease tone and pain in low back with LAQ and     Assessment  Assessment: Pt reports increased BLE fatigue today, he required frequent cuing to stand tall with sink ex and ambulation. Reduced reps with sink ex d/t fatigue. WIll continue with aquatics for increased strength/ROM in an unloaded environment. Activity Tolerance  Activity Tolerance: Patient limited by fatigue    Patient Education  Ex technique and importance of daily activity  Pt verbalized/demonstrated good understanding:     [x] Yes         [] No, pt required further clarification. Post Treatment Pain:  0/10      Plan  Times per week: 2  Plan weeks: 4-6      Goals  (Total # of Visits to Date: 21)      Short term goals  Time Frame for Short term goals: 3 weeks  Short term goal 1: Pt will initiate HEP. - met  Short term goal 2: Pt will initiate aquatic therapy program to assist with improving LE strengthening and endurance. -met    Long term goals  Time Frame for Long term goals : 6 weeks  Long term goal 1: Pt will be independent and compliant with HEP. Long term goal 2: Pt will safely perform TUG with LRAD in </= 13 seconds to decrease risk of falls. Long term goal 3: Pt will demonstrate ability to maintain tandem stance for >/= 10 seconds to improve balance with NBOS.   Long term goal 4: Pt will tolerate >/= 40-45mins ther ex/act with minimal rest breaks to improve endurance and activity tolerance. -progressing  Long term goal 5: Pt will safely ambulate household distances with LRAD to improve functional ambulation throughout house. -met    Minutes Tracking:  Time In: 1334  Time Out: Kurtis 141  Minutes: [address] [facility], [facility]     Date: 2/7/2022

## 2022-02-11 ENCOUNTER — HOSPITAL ENCOUNTER (OUTPATIENT)
Dept: PHYSICAL THERAPY | Age: 87
Setting detail: THERAPIES SERIES
Discharge: HOME | End: 2022-02-11
Payer: MEDICARE

## 2022-02-11 PROCEDURE — 97113 AQUATIC THERAPY/EXERCISES: CPT

## 2022-02-11 NOTE — PROGRESS NOTES
Phone: Miya           Fax: [phone]                           Outpatient Physical Therapy                                                                            Daily Note    Patient: Usama Sanchez : 1932  CSN #: [id number]   Referring Practitioner:  Ethan Posada DO    Referral Date : 10/19/21     Date: 2022    Diagnosis: Leg weakness  Treatment Diagnosis: generalized weakness, difficulty walking    Onset Date: 10/19/21  PT Insurance Information: Medicare  Total # of Visits Approved: 24 Per Physician Order  Total # of Visits to Date: 24  No Show: 0  Canceled Appointment: 1      Pre-Treatment Pain:  Not stated  Subjective: Pt caregiver reports pt had a fall a few days ago. She reports he was home alone when it happened. Pt states he stood up from bed and \"just fell over. \"  Both deny injury, just some abrasions. Pt states \"my legs aren't worth a damn today. \"    Exercises:  Exercise 1: HEP: sink ex-- without squats  Exercise 2: >> aquatic therapy program for LE strengthening and endurance  Exercise 3: AQ; water walk x2 laps fwd, lateral, retro in shallow;  x2 in the deep  Exercise 4: AQ:  Sink ex x15  Exercise 5: AQ:  deep water hanging  Exercise 6: AQ:  sit to stand x15  Exercise 7: AQ: blue bar pull down to waist; pink board push/pull x15 - both with posterior support. Exercise 8: AQ: jet massage to decrease tone and pain in low back with LAQ and march x10  Assessment  Assessment: Pt with fair tolerance to exercises today, he tolerated 40 min of aquatic exercises with short duration RBs occasionally. Will cont with aquatics for increased strength/ROM in an unloaded environment. Activity Tolerance  Activity Tolerance: Patient Tolerated treatment well    Patient Education  Ex technique  Pt verbalized/demonstrated good understanding:     [x] Yes         [] No, pt required further clarification.        Post Treatment Pain:  Not stated      Plan  Times per week: 2  Plan weeks: 4-6      Goals  (Total # of Visits to Date: 25)      Short term goals  Time Frame for Short term goals: 3 weeks  Short term goal 1: Pt will initiate HEP. - met  Short term goal 2: Pt will initiate aquatic therapy program to assist with improving LE strengthening and endurance. -met    Long term goals  Time Frame for Long term goals : 6 weeks  Long term goal 1: Pt will be independent and compliant with HEP. Long term goal 2: Pt will safely perform TUG with LRAD in </= 13 seconds to decrease risk of falls. Long term goal 3: Pt will demonstrate ability to maintain tandem stance for >/= 10 seconds to improve balance with NBOS.   Long term goal 4: Pt will tolerate >/= 40-45mins ther ex/act with minimal rest breaks to improve endurance and activity tolerance. -progressing  Long term goal 5: Pt will safely ambulate household distances with LRAD to improve functional ambulation throughout house. -met    Minutes Tracking:  Time In: 1336  Time Out: [address]  Minutes: Mayur 90, PTA     Date: 2/11/2022

## 2022-02-18 ENCOUNTER — HOSPITAL ENCOUNTER (OUTPATIENT)
Dept: PHYSICAL THERAPY | Age: 87
Setting detail: THERAPIES SERIES
Discharge: HOME | End: 2022-02-18
Payer: MEDICARE

## 2022-02-21 ENCOUNTER — HOSPITAL ENCOUNTER (OUTPATIENT)
Dept: PHYSICAL THERAPY | Age: 87
Setting detail: THERAPIES SERIES
Discharge: HOME | End: 2022-02-21
Payer: MEDICARE

## 2022-02-21 PROCEDURE — 97113 AQUATIC THERAPY/EXERCISES: CPT

## 2022-02-25 ENCOUNTER — HOSPITAL ENCOUNTER (OUTPATIENT)
Dept: PHYSICAL THERAPY | Age: 87
Setting detail: THERAPIES SERIES
Discharge: HOME | End: 2022-02-25
Payer: MEDICARE

## 2022-02-25 PROCEDURE — 97113 AQUATIC THERAPY/EXERCISES: CPT

## 2022-02-25 NOTE — PROGRESS NOTES
Phone: Miya           Fax: [phone]                           Outpatient Physical Therapy                                                                            Daily Note    Patient: Jyoti Barry : 1932  CSN #: [id number]   Referring Practitioner:  Alvaro Sherman DO          Date: 2022    Diagnosis: Leg weakness  Treatment Diagnosis: generalized weakness, difficulty walking    Onset Date: 10/19/21  PT Insurance Information: Medicare  Total # of Visits Approved: 24 Per Physician Order  Total # of Visits to Date: 26  No Show: 0  Canceled Appointment: 1      Pre-Treatment Pain:  Not stated  Subjective: Pt reports his legs are hurting today. Exercises:  Exercise 1: HEP: sink ex-- without squats  Exercise 2: >> aquatic therapy program for LE strengthening and endurance  Exercise 3: AQ; water walk x2 laps fwd, lateral, retro in shallow;  x2 in the deep  Exercise 4: AQ:  Sink ex x15  Exercise 5: AQ:  deep water hanging    Assessment  Assessment: Focused on ambulation in pool today while on waist deep side to increase functional strength. Pt took few short standing Rbs d/t fatigue. Unable to tolerate full 45 min of aquatic ex d/t fatiuge. WIll progress aquatics for increased strength/ROM in an unloaded environment. Activity Tolerance  Activity Tolerance: Patient limited by fatigue    Patient Education  Ex technique and continued activity at home  Pt verbalized/demonstrated good understanding:     [x] Yes         [] No, pt required further clarification. Post Treatment Pain: not stated      Plan  Times per week: 2  Plan weeks: 4-6      Goals  (Total # of Visits to Date: 32)      Short term goals  Time Frame for Short term goals: 3 weeks  Short term goal 1: Pt will initiate HEP. - met  Short term goal 2: Pt will initiate aquatic therapy program to assist with improving LE strengthening and endurance.  -met    Long term goals  Time Frame for Long term goals : 6 weeks  Long term goal 1: Pt will be independent and compliant with HEP. -met  Long term goal 2: Pt will safely perform TUG with LRAD in </= 13 seconds to decrease risk of falls. Long term goal 3: Pt will demonstrate ability to maintain tandem stance for >/= 10 seconds to improve balance with NBOS.   Long term goal 4: Pt will tolerate >/= 40-45mins ther ex/act with minimal rest breaks to improve endurance and activity tolerance. -progressing  Long term goal 5: Pt will safely ambulate household distances with LRAD to improve functional ambulation throughout house. -met    Minutes Tracking:  Time In: 1412  Time Out: Sujey Quintero 70  Minutes: [address] [facility], [facility]     Date: 2/25/2022

## 2022-02-28 ENCOUNTER — HOSPITAL ENCOUNTER (OUTPATIENT)
Dept: PHYSICAL THERAPY | Age: 87
Setting detail: THERAPIES SERIES
Discharge: HOME | End: 2022-02-28
Payer: MEDICARE

## 2022-02-28 NOTE — PLAN OF CARE
and activity tolerance. -progressing  Long term goal 5: Pt will safely ambulate household distances with LRAD to improve functional ambulation throughout house. -met     Prognosis  Prognosis: Good    Treatment Plan   Times per week: 2  Plan weeks: 4-6  [x] HP/CP      [] Electrical Stim   [x] Therapeutic Exercise      [] Gait Training  [x] Aquatics   [] Ultrasound         [x] Patient Education/HEP   [] Manual Therapy  [] Traction    [] Neuro-jolie        [] Soft Tissue Mobs            [] Home TENS  [] Iontophoresis    [] Orthotic casting/fitting      [] Dry Needling             Electronically signed by: Schuyler Vyas PT, DPT    Date: 2/7/2022      ______________________________________ Date: 2/7/2022   Physician Signature

## 2022-03-02 NOTE — PROGRESS NOTES
[facility]  Inpatient/Observation/Outpatient Rehabilitation    Date: 3/2/2022  Patient Name: Jen Aaron       [x] Inpatient Acute/Observation       []  Outpatient  : 1932       [x] Pt no showed for scheduled appointment       Thai Hall PTA Date: 3/2/2022

## 2022-03-04 ENCOUNTER — HOSPITAL ENCOUNTER (OUTPATIENT)
Dept: PHYSICAL THERAPY | Age: 87
Setting detail: THERAPIES SERIES
Discharge: HOME | End: 2022-03-04

## 2022-03-04 NOTE — PROGRESS NOTES
Physical Therapy  [facility]  Inpatient/Observation/Outpatient Rehabilitation    Date: 3/4/2022  Patient Name: Jyoti Barry       [] Inpatient Acute/Observation       [x]  Outpatient  : 1932       [] Pt no showed for scheduled appointment    [] Pt refused/declined therapy at this time due to:           [x] Pt cancelled due to:  [x] No Reason Given   [] Sick/ill   [] Other:    Will attempt evaluation at our earliest opportunity.        Skip Aj Date: 3/4/2022

## 2022-05-12 RX ORDER — PANTOPRAZOLE SODIUM 40 MG/1
TABLET, DELAYED RELEASE ORAL
Qty: 30 TABLET | Refills: 3 | Status: SHIPPED | OUTPATIENT
Start: 2022-05-12

## 2022-05-24 NOTE — DISCHARGE SUMMARY
Phone: Miya          Fax: [phone]                            Outpatient Physical Therapy                                                                    Discharge Summary    Patient: Sosa Andrade  : 1932  CSN #: [id number]   Referring Physician: Perla Tee DO   Diagnosis: Leg weakness  Treatment Diagnosis: generalized weakness, difficulty walking      Date Treatment Initiated: 21  Date of Last Treatment: 22      PT Visit Information  Onset Date: 10/19/21  PT Insurance Information: Medicare  Total # of Visits Approved: 24  Total # of Visits to Date: 32  Plan of Care/Certification Expiration Date: 22  No Show: 0  Canceled Appointment: 1      Frequency/Duration   2 times per week  Plan weeks: 6 weeks      Treatment Received  [] HP/CP      [] Electrical Stim   [x] Therapeutic Exercise      [] Gait Training  [x] Aquatics   [] Ultrasound         [x] Patient Education/HEP   [] Manual Therapy  [] Traction    [] Neuro-jolie        [] Soft Tissue Mobs            [] Home TENS  [] Iontophoresis    [] Orthotic casting/fitting      [] Dry Needling    Assessment  Assessment: Pt has completed 26 PT visits to date. Pt cancelled and no-showed his last 2 scheduled appts. No additional appt made following that time. Will now D/C from PT. Goals  Short Term Goals  Time Frame for Short term goals: 3 weeks  Short term goal 1: Pt will initiate HEP. - met  Short term goal 2: Pt will initiate aquatic therapy program to assist with improving LE strengthening and endurance. -met    Long Term Goals  Time Frame for Long term goals : 6 weeks  Long term goal 1: Pt will be independent and compliant with HEP. -met  Long term goal 2: Pt will safely perform TUG with LRAD in </= 13 seconds to decrease risk of falls. Long term goal 3: Pt will demonstrate ability to maintain tandem stance for >/= 10 seconds to improve balance with NBOS.   Long term goal 4: Pt will tolerate >/= 40-45mins ther ex/act with minimal rest breaks to improve endurance and activity tolerance. -progressing  Long term goal 5: Pt will safely ambulate household distances with LRAD to improve functional ambulation throughout house. -met      Reason for Discharge  [] Goals Achieved                        []  Poor Follow Through/Attendance                  []  Optimal Function Achieved     [x]  Patient Discharged Self    []  Hospitalization                         []  Physician discharge      Thank you for this referral      Patra Najjar, PT, DPT               Date: 5/24/2022

## 2022-06-06 ENCOUNTER — HOSPITAL ENCOUNTER (OUTPATIENT)
Dept: PHYSICAL THERAPY | Age: 87
Setting detail: THERAPIES SERIES
Discharge: HOME | End: 2022-06-06
Payer: MEDICARE

## 2022-06-06 PROCEDURE — 97110 THERAPEUTIC EXERCISES: CPT

## 2022-06-06 PROCEDURE — 97162 PT EVAL MOD COMPLEX 30 MIN: CPT

## 2022-06-06 ASSESSMENT — 10 METER WALK TEST (10METWT)
TRIAL 1: TIME TO WALK 10 METERS: 15.06
SCORE (M/S): 0.66
AVERAGE: 15.1

## 2022-06-06 NOTE — PROGRESS NOTES
Phone: [address]          Fax: [phone]                      Outpatient Physical Therapy                                                                      Evaluation  Date: 2022  Patient: Derek Gonzales  : 1932  [facility] #: [id number]    Referring Physician: Catracho Fountain DO     Medical Diagnosis: Leg weakness    Treatment Diagnosis: generalized weakness, difficulty walking  Onset Date: 22  PT Insurance Information: Medicare  Total # of Visits Approved: 8   Total # of Visits to Date: 1  No Show: 0  Canceled Appointment: 0     Subjective  Subjective: Pt states that he is having difficulty walking as far as he could before, wants to improve overall endruance and strength. Pt has 0/10 normally, has increases in soreness following activity.        Observations:   General Observations  General Observations: Yes  Description: Pt transported via wc by caregiver most of the time, can transfer Corrine with walking stick    Ambulation/Gait (if applicable):   Ambulation  WB Status: FWB  Ambulation  Device: Single point cane  Assistance: Modified Independent  Quality of Gait: Antaligic, staggering steps, varying step length, decreased step height, inconsistent kirt  Distance: 42'    Objective    Strength       Strength LLE  L Hip Flexion: 4-/5  L Hip Extension: 4-/5  L Hip ABduction: 4-/5  L Hip ADduction: 3+/5  L Hip Internal Rotation: 3+/5  L Hip External Rotation: 3+/5  L Knee Flexion: 4-/5  L Knee Extension: 4-/5   Strength RLE  R Hip Flexion: 4-/5  R Hip Extension: 4-/5  R Hip ABduction: 4-/5  R Hip ADduction: 3+/5  R Hip Internal Rotation: 3+/5  R Hip External Rotation: 3+/5  R Knee Flexion: 4-/5  R Knee Extension: 4-/5       Exercises:  Exercise 1: HEP: standing tolerance / transfer practice  Exercise 2: >> aquatic therapy program for LE strengthening and endurance    Functional Outcome Measures   10 Ft walk test: 15.06s walking stick  Timed Up and Go: 33.4 walking stick   Max Walking Distance: 42 Ft walking stick     Assessment  Body Structures, Functions, Activity Limitations Requiring Skilled Therapeutic Intervention: Decreased functional mobility ,Decreased ADL status,Decreased body mechanics,Decreased strength,Decreased tolerance to work activity,Decreased endurance,Decreased balance  Assessment: Pt is a very pleasant 81 yo man who presents with Leg weakness. The patient states he has no pain at rest, and only mild soreness after activity that lasts no more than a day. The patient currently presents with 3+/5 - 4-/5 LE strength overall. The pt currently completes his TUG with walking stick in 33.4s. It take the patient 15.06 seconds to ambulate appx 10 ft, and the patient is able to ambulate a distance of 42 feet before becoming fatigued and needing to rest. The pt also presents with an antalgic gait pattern and unsteadiness with standing activities. Pt would benefit from skilled therapy and aquatic therapy to better improve his functional strength and endurance to address these functional defecits and improve pt's efficiency and safety of ADLs. Therapy Prognosis: Good        Decision Making: Medium Complexity    Patient Education  Patient Education: PT POC and HEP. Also educated on benefits of aquatic therapy program  Pt verbalized/demonstrated good understanding:     [X] Yes         [] No, pt required further clarification. Goals  Short Term Goals  Time Frame for Short term goals: 3 weeks  Short term goal 1: Pt will initiate HEP  Short term goal 2: Pt will initiate aquatic therapy program to assist with improving LE strengthening and endurance    Long Term Goals  Time Frame for Long term goals : 6 weeks  Long term goal 1: Pt will be independent and compliant with HEP  Long term goal 2: Pt will safely perform TUG with LRAD in </= 20 seconds to decrease risk of falls.   Long term goal 3: Pt will be able to complete 10' Walk test in </= 10s to improve safety with community ambulation  Long term goal 4: Pt will tolerate >/= 40-45mins ther ex/act with minimal rest breaks to improve endurance and activity tolerance  Long term goal 5: Pt will safely ambulate household distances with LRAD to improve functional ambulation throughout house      Patient goals :  \"To walk farther and not use chair as much; use cane\"        Minutes Tracking:  Time In: 1100  Time Out: 305 [facility]  Minutes: 2[address] Fawn Anderson, PT, DPT    6/6/2022

## 2022-06-06 NOTE — PLAN OF CARE
[facility]           Phone: [phone]             Outpatient Physical Therapy  Fax: [phone]                                           Date: 2022  Patient: Haorldo Richardson : 1932 [facility] #: [id number]   Referring Physician: Krista Ayoub DO  Referral Date:          [x] Plan of Care   [] Updated Plan of Care    Dates of Service to Include: 2022 to 22    Diagnosis:  Leg weakness    Rehab (Treatment) Diagnosis:  generalized weakness, difficulty walking             Onset Date:  22    Attendance  Total # of Visits to Date: 1 No Show: 0 Canceled Appointment: 0    Assessment  Body Structures, Functions, Activity Limitations Requiring Skilled Therapeutic Intervention: Decreased functional mobility ,Decreased ADL status,Decreased body mechanics,Decreased strength,Decreased tolerance to work activity,Decreased endurance,Decreased balance  Assessment: Pt is a very pleasant 79 yo man who presents with Leg weakness. The patient states he has no pain at rest, and only mild soreness after activity that lasts no more than a day. The patient currently presents with 3+/5 - 4-/5 LE strength overall. The pt currently completes his TUG with walking stick in 33.4s. It take the patient 15.06 seconds to ambulate appx 10 ft, and the patient is able to ambulate a distance of 42 feet before becoming fatigued and needing to rest. The pt also presents with an antalgic gait pattern and unsteadiness with standing activities. Pt would benefit from skilled therapy and aquatic therapy to better improve his functional strength and endurance to address these functional defecits and improve pt's efficiency and safety of ADLs.       Goals  Short Term Goals  Time Frame for Short term goals: 3 weeks  Short term goal 1: Pt will initiate HEP  Short term goal 2: Pt will initiate aquatic therapy program to assist with improving LE strengthening and endurance  Long Term Goals  Time Frame for Long term goals : 6 weeks  Long term goal 1: Pt will be independent and compliant with HEP  Long term goal 2: Pt will safely perform TUG with LRAD in </= 20 seconds to decrease risk of falls.   Long term goal 3: Pt will be able to complete 10' Walk test in </= 10s to improve safety with community ambulation  Long term goal 4: Pt will tolerate >/= 40-45mins ther ex/act with minimal rest breaks to improve endurance and activity tolerance  Long term goal 5: Pt will safely ambulate household distances with LRAD to improve functional ambulation throughout house     Prognosis  Therapy Prognosis: Good    Treatment Plan   Plan Frequency: 1x/week  Plan weeks: 4-6  [] HP/CP      [] Electrical Stim   [x] Therapeutic Exercise      [x] Gait Training  [x] Aquatics   [] Ultrasound         [x] Patient Education/HEP   [] Manual Therapy  [] Traction    [x] Neuro-jolie        [] Soft Tissue Mobs            [] Home TENS  [] Iontophoresis    [] Orthotic casting/fitting      [] Dry Needling             Electronically signed by: Smitha Perry PT, DPT    Date: 6/6/2022      ______________________________________ Date: 6/6/2022   Physician Signature

## 2022-06-08 RX ORDER — TAMSULOSIN HYDROCHLORIDE 0.4 MG/1
CAPSULE ORAL
Qty: 60 CAPSULE | Refills: 11 | Status: SHIPPED | OUTPATIENT
Start: 2022-06-08

## 2022-06-08 NOTE — TELEPHONE ENCOUNTER
Patient seen within the past year. Patient tolerates Flomax twice a day well. We will refill. We did review his chart.

## 2022-06-13 ENCOUNTER — HOSPITAL ENCOUNTER (OUTPATIENT)
Dept: PHYSICAL THERAPY | Age: 87
Setting detail: THERAPIES SERIES
Discharge: HOME | End: 2022-06-13
Payer: MEDICARE

## 2022-06-13 PROCEDURE — 97113 AQUATIC THERAPY/EXERCISES: CPT

## 2022-06-13 NOTE — PROGRESS NOTES
Phone: Miya           Fax: [phone]                           Outpatient Physical Therapy                                                                            Daily Note    Patient: Alyssa Muir : 1932  CSN #: [id number]   Referring Physician: Ralph Vargas DO    Date: 2022       Treatment Diagnosis: generalized weakness, difficulty walking    Onset Date: 22  Total # of Visits Approved: 8 Per Physician Order  Total # of Visits to Date: 2  No Show: 0  Canceled Appointment: 0      Pre-Treatment Pain:  0/10  Subjective: Pt reports feeling okay today. Per pt caregiver, pt is eager to resume water therapy. Exercises:  Exercise 1: HEP: standing tolerance / transfer practice  Exercise 2: >> aquatic therapy program for LE strengthening and endurance  Exercise 3: AQ; water walk x2 laps fwd, lateral, retro in shallow;  x2 in the deep  Exercise 4: AQ:  Sink ex x15  Exercise 8: AQ: jet massage to decrease tone and pain in low back with LAQ and march x1        Assessment  Assessment: Pt was able to resume water therapy today after ~3 months. He had multiple bouts of \"dropping\" down in the water so the water was at shoulder height- but he states he was cold and LE's were tired. Pt tolerated ~30 min collectively of aquatic exercises. He reports relief after session. WIll continue with aquatics for increased strength/ROM in an unloaded environment. Activity Tolerance  Activity Tolerance: Patient tolerated treatment well,Patient limited by fatigue    Patient Education  Ex rationale, correct ex form. Pt verbalized/demonstrated good understanding:     [x] Yes         [] No, pt required further clarification.        Post Treatment Pain: 0 /10      Plan  Plan Frequency: 1x/week  Plan weeks: 4-6       Goals  (Total # of Visits to Date: 2)      Short Term Goals  Time Frame for Short term goals: 3 weeks  Short term goal 1: Pt will initiate HEP  Short term goal 2: Pt will initiate aquatic therapy program to assist with improving LE strengthening and endurance-met    Long Term Goals  Time Frame for Long term goals : 6 weeks  Long term goal 1: Pt will be independent and compliant with HEP  Long term goal 2: Pt will safely perform TUG with LRAD in </= 20 seconds to decrease risk of falls.   Long term goal 3: Pt will be able to complete 10' Walk test in </= 10s to improve safety with community ambulation  Long term goal 4: Pt will tolerate >/= 40-45mins ther ex/act with minimal rest breaks to improve endurance and activity tolerance  Long term goal 5: Pt will safely ambulate household distances with LRAD to improve functional ambulation throughout house    Minutes Tracking:  Time In: [address]  Time Out: [address]  Minutes: [address], PTA     Date: 6/13/2022

## 2022-06-24 ENCOUNTER — HOSPITAL ENCOUNTER (OUTPATIENT)
Dept: PHYSICAL THERAPY | Age: 87
Setting detail: THERAPIES SERIES
Discharge: HOME | End: 2022-06-24
Payer: MEDICARE

## 2022-06-24 PROCEDURE — 97113 AQUATIC THERAPY/EXERCISES: CPT

## 2022-06-24 NOTE — PROGRESS NOTES
Phone: Miya           Fax: [phone]                           Outpatient Physical Therapy                                                                            Daily Note    Patient: Natasha Osborn : 1932  CSN #: [id number]   Referring Physician: Yazmin Blanca DO    Date: 2022       Treatment Diagnosis: generalized weakness, difficulty walking    Onset Date: 22  Total # of Visits Approved: 8 Per Physician Order  Total # of Visits to Date: 3  No Show: 0  Canceled Appointment: 0      Pre-Treatment Pain: 9 /10  Subjective: Pt reports legs are aching today and rates 9/10. Pt caregiver reports pt had a fall out of his bed, no injuries reported, just abrasion on R elbow. Exercises:  Exercise 1: HEP: standing tolerance / transfer practice  Exercise 2: >> aquatic therapy program for LE strengthening and endurance  Exercise 3: AQ; water walk 2x3 laps fwd in deepest level  Exercise 4: AQ:  Sink ex x15 --only march, heel raise and butt kicks today  Exercise 8: AQ: jet massage to decrease tone and pain in low back with LAQ x15    Assessment  Assessment: Pt had poor tolerance in pool today, he took multiple, short standing RBs d/t fatigue and LE pain. Pt unable to fully extend B LEs and trunk in chest level water and noted to have ~4 bouts of knee buckling. WIll continue with aquatics for increase dstrength/ROM in an unloaded environment. Activity Tolerance  Activity Tolerance: Patient limited by fatigue,Patient limited by pain    Patient Education  Ex technique, HEP  Pt verbalized/demonstrated good understanding:     [x] Yes         [] No, pt required further clarification.        Post Treatment Pain:  /10      Plan  Plan Frequency: 1x/week  Plan weeks: 4-6       Goals  (Total # of Visits to Date: 3)      Short Term Goals  Time Frame for Short term goals: 3 weeks  Short term goal 1: Pt will initiate HEP-met  Short term goal 2: Pt will initiate aquatic therapy program to assist with improving LE strengthening and endurance-met    Long Term Goals  Time Frame for Long term goals : 6 weeks  Long term goal 1: Pt will be independent and compliant with HEP  Long term goal 2: Pt will safely perform TUG with LRAD in </= 20 seconds to decrease risk of falls.   Long term goal 3: Pt will be able to complete 10' Walk test in </= 10s to improve safety with community ambulation  Long term goal 4: Pt will tolerate >/= 40-45mins ther ex/act with minimal rest breaks to improve endurance and activity tolerance  Long term goal 5: Pt will safely ambulate household distances with LRAD to improve functional ambulation throughout house    Minutes Tracking:  Time In: 0946  Time Out: 1010  Minutes: 24  Timed Code Treatment Minutes: [facility]on [facility], [place]     Date: 6/24/2022

## 2022-06-27 ENCOUNTER — HOSPITAL ENCOUNTER (OUTPATIENT)
Dept: PHYSICAL THERAPY | Age: 87
Setting detail: THERAPIES SERIES
Discharge: HOME | End: 2022-06-27
Payer: MEDICARE

## 2022-06-27 PROCEDURE — 97113 AQUATIC THERAPY/EXERCISES: CPT

## 2022-06-27 NOTE — PROGRESS NOTES
Phone: Miya           Fax: [phone]                           Outpatient Physical Therapy                                                                            Daily Note    Patient: Masood Gramajo : 1932  CSN #: [id number]   Referring Physician: Deri Schirmer, DO    Date: 2022       Treatment Diagnosis: generalized weakness, difficulty walking    Onset Date: 22  Total # of Visits Approved: 8 Per Physician Order  Total # of Visits to Date: 4  No Show: 0  Canceled Appointment: 0      Pre-Treatment Pain:  Not stated   Subjective: Pt caregiver reports no new changes/concerns. Pt reports general fatigue and aching in LEs. Exercises:  Exercise 1: HEP: standing tolerance / transfer practice  Exercise 2: >> aquatic therapy program for LE strengthening and endurance  Exercise 3: AQ; water walk x3 laps fwd/lateral/retro in deepest level  Exercise 4: AQ:  Sink ex x8-10  Exercise 6: AQ:  sit to stand x8  Exercise 8: AQ: jet massage to decrease tone and pain in low back with LAQ x15    Assessment  Assessment: Pt demonstrated better tolerance today with aquatic exercises. He was able to progress general strengthening and endurance with multiple, few short RBs. Pt still only tolerates ~25 min total of exercise in deepest portion of pool. Will continue with aquatics for increased strength/ROM in an unloaded environment. Activity Tolerance  Activity Tolerance: Patient limited by fatigue,Patient tolerated treatment well    Patient Education  Ex technique, progression, continued activity at home  Pt verbalized/demonstrated good understanding:     [x] Yes         [] No, pt required further clarification.        Post Treatment Pain:  Not stated      Plan  Plan Frequency: 1x/week  Plan weeks: 4-6       Goals  (Total # of Visits to Date: 4)      Short Term Goals  Time Frame for Short term goals: 3 weeks  Short term goal 1: Pt will initiate HEP-met  Short term goal 2: Pt will initiate aquatic therapy program to assist with improving LE strengthening and endurance-met    Long Term Goals  Time Frame for Long term goals : 6 weeks  Long term goal 1: Pt will be independent and compliant with HEP  Long term goal 2: Pt will safely perform TUG with LRAD in </= 20 seconds to decrease risk of falls.   Long term goal 3: Pt will be able to complete 10' Walk test in </= 10s to improve safety with community ambulation  Long term goal 4: Pt will tolerate >/= 40-45mins ther ex/act with minimal rest breaks to improve endurance and activity tolerance  Long term goal 5: Pt will safely ambulate household distances with LRAD to improve functional ambulation throughout house    Minutes Tracking:  Time In: 0945  Time Out: 1013  Minutes: 28  Timed Code Treatment Minutes: [address] [place] [address]     Date: 6/27/2022

## 2022-07-08 ENCOUNTER — HOSPITAL ENCOUNTER (OUTPATIENT)
Dept: PHYSICAL THERAPY | Age: 87
Setting detail: THERAPIES SERIES
Discharge: HOME | End: 2022-07-08
Payer: MEDICARE

## 2022-07-08 PROCEDURE — 97113 AQUATIC THERAPY/EXERCISES: CPT

## 2022-07-08 NOTE — PROGRESS NOTES
Phone: Miya           Fax: [phone]                           Outpatient Physical Therapy                                                                            Daily Note    Patient: Nichole Bonds : 1932  CSN #: [id number]   Referring Physician: Steve Tony DO    Date: 2022       Treatment Diagnosis: generalized weakness, difficulty walking    Onset Date: 22  Total # of Visits Approved: 8 Per Physician Order  Total # of Visits to Date: 5  No Show: 0  Canceled Appointment: 0      Pre-Treatment Pain:  Not stated  Subjective: Pt caregiver reports no issues or changes to pt since his last visit. Just usual fatigue for the next few hours following aquatic session. Pt reports aching in BLEs    Exercises:  Exercise 1: HEP: standing tolerance / transfer practice  Exercise 2: >> aquatic therapy program for LE strengthening and endurance  Exercise 3: AQ; water walk x4 laps fwd/lateral/retro in deepest level  Exercise 4: AQ; march, hip abd x10 ea  Exercise 7: AQ: blue bar pull down to waist x15 - both with posterior support. Exercise 8: AQ: jet massage to decrease tone and pain in low back with LAQ x15    Assessment  Assessment: Pt more limited with general fatigue today. He required frequent but short standing RBs. Pt caregiver reports pt will not do HEP depsite education/encouragement. Will continue with aquatics for increased strength/ROM in an unloaded environment. Activity Tolerance  Activity Tolerance: Patient limited by fatigue,Patient tolerated treatment well    Patient Education  Ex technique, rationale, importance of exercise  Pt verbalized/demonstrated good understanding:     [x] Yes         [] No, pt required further clarification.        Post Treatment Pain:  Not stated      Plan  Plan Frequency: 1x/week  Plan weeks: 4-6       Goals  (Total # of Visits to Date: 5)      Short Term Goals  Time Frame for Short term goals: 3 weeks  Short term goal 1: Pt will initiate HEP-met  Short term goal 2: Pt will initiate aquatic therapy program to assist with improving LE strengthening and endurance-met    Long Term Goals  Time Frame for Long term goals : 6 weeks  Long term goal 1: Pt will be independent and compliant with HEP-  not met  Long term goal 2: Pt will safely perform TUG with LRAD in </= 20 seconds to decrease risk of falls.   Long term goal 3: Pt will be able to complete 10' Walk test in </= 10s to improve safety with community ambulation  Long term goal 4: Pt will tolerate >/= 40-45mins ther ex/act with minimal rest breaks to improve endurance and activity tolerance  Long term goal 5: Pt will safely ambulate household distances with LRAD to improve functional ambulation throughout house    Minutes Tracking:  Time In: 1020  Time Out: [address]  Minutes: [address], PTA     Date: 7/8/2022

## 2022-07-11 ENCOUNTER — HOSPITAL ENCOUNTER (OUTPATIENT)
Dept: PHYSICAL THERAPY | Age: 87
Setting detail: THERAPIES SERIES
Discharge: HOME | End: 2022-07-11
Payer: MEDICARE

## 2022-07-11 PROCEDURE — 97113 AQUATIC THERAPY/EXERCISES: CPT

## 2022-07-11 NOTE — PROGRESS NOTES
Phone: Miya           Fax: [phone]                           Outpatient Physical Therapy                                                                            Daily Note    Patient: Veronica Monte : 1932  CSN #: [id number]   Referring Physician: Garcia Avila DO    Date: 2022       Treatment Diagnosis: generalized weakness, difficulty walking    Onset Date: 22  Total # of Visits Approved: 8 Per Physician Order  Total # of Visits to Date: 6  No Show: 0  Canceled Appointment: 0      Pre-Treatment Pain: not stated  Subjective: Pt caregiver reports no changes or concerns. Pt reports continued aching in BLE's    Exercises:  Exercise 1: HEP: standing tolerance / transfer practice  Exercise 2: >> aquatic therapy program for LE strengthening and endurance  Exercise 3: AQ; water walk x4 laps fwd/lateral/retro in deepest level  Exercise 4: AQ; sink x10 ea    Assessment  Assessment: Pt tolerated aquatic program better today and was able to progress with more strengthening exercises. Caregiver reports pt has been using walking stick more out in community and around home. Will continue with aquatics for increased strength/ROM in an unloaded environment. Activity Tolerance  Activity Tolerance: Patient tolerated treatment well    Patient Education  Ex technique and progression  Pt verbalized/demonstrated good understanding:     [x] Yes         [] No, pt required further clarification.        Post Treatment Pain:  Not stated    Plan  Plan Frequency: 1x/week  Plan weeks: 4-6       Goals  (Total # of Visits to Date: 6)      Short Term Goals  Time Frame for Short term goals: 3 weeks  Short term goal 1: Pt will initiate HEP-met  Short term goal 2: Pt will initiate aquatic therapy program to assist with improving LE strengthening and endurance-met    Long Term Goals  Time Frame for Long term goals : 6 weeks  Long term goal 1: Pt will be independent and compliant with HEP-  not met  Long term goal 2: Pt will safely perform TUG with LRAD in </= 20 seconds to decrease risk of falls.   Long term goal 3: Pt will be able to complete 10' Walk test in </= 10s to improve safety with community ambulation  Long term goal 4: Pt will tolerate >/= 40-45mins ther ex/act with minimal rest breaks to improve endurance and activity tolerance  Long term goal 5: Pt will safely ambulate household distances with LRAD to improve functional ambulation throughout house -met    Minutes Tracking:  Time In: 1017  Time Out: 1045  Minutes: 28  Timed Code Treatment Minutes: [address] [facility] [address], PTA     Date: 7/11/2022

## 2022-07-18 ENCOUNTER — HOSPITAL ENCOUNTER (OUTPATIENT)
Dept: PHYSICAL THERAPY | Age: 87
Setting detail: THERAPIES SERIES
Discharge: HOME | End: 2022-07-18
Payer: MEDICARE

## 2022-07-18 PROCEDURE — 97113 AQUATIC THERAPY/EXERCISES: CPT

## 2022-07-18 NOTE — PROGRESS NOTES
Physical Therapy  Daily Treatment Note  Date: 2022  Patient Name: Anne Pina  MRN: [id number]     :   1932    Referring Physician: Rosie Estrella DO     PCP: Avinash Mendez DO    Medical Diagnosis: Leg weakness [R29.898]    Treatment Diagnosis: generalized weakness, difficulty walking      Insurance: Payor: Sharon Huston / Plan: MEDICARE PART A AND B / Product Type: *No Product type* /   Insurance ID: [id number] - (Medicare)    Subjective:   PT Visit Information  Onset Date: 22  Total # of Visits Approved: 8  Total # of Visits to Date: 7  Plan of Care/Certification Expiration Date: 22  No Show: 0  Canceled Appointment: 0  Subjective  Subjective: Pt caregiver reports pt hasn't done much around home and states she is working on getting him walking more and doing HEP. Pt reports general fatigue, mainly in LEs. Treatment Activities:  Exercises:  Aquatic therapy (CPT 20206)   Treatment Reasoning    Exercise 1: HEP: standing tolerance / transfer practice  Exercise 2: >> aquatic therapy program for LE strengthening and endurance  Exercise 3: AQ; water walk x4 laps fwd/lateral/retro in deepest level  Exercise 4: AQ; sink x10 ea  Exercise 8: AQ: jet massage to decrease tone and pain in low back with LAQ/march x15     To increase strength/ROM in an unloaded environment. Assessment:   Conditions Requiring Skilled Therapeutic Intervention  Assessment: Progressed walking distances today in pool with pt fatiguing quick. He took frequent, short standing RBs d/t fatigue. Pt encouraged to increase activity at home.   Treatment Diagnosis: generalized weakness, difficulty walking  Activity Tolerance  Activity Tolerance: Patient tolerated treatment well      Goals:  Short Term Goals  Time Frame for Short term goals: 3 weeks  Short term goal 1: Pt will initiate HEP-met  Short term goal 2: Pt will initiate aquatic therapy program to assist with improving LE strengthening and endurance-met  Long Term Goals  Time Frame for Long term goals : 6 weeks  Long term goal 1: Pt will be independent and compliant with HEP-  not met  Long term goal 2: Pt will safely perform TUG with LRAD in </= 20 seconds to decrease risk of falls. Long term goal 3: Pt will be able to complete 10' Walk test in </= 10s to improve safety with community ambulation  Long term goal 4: Pt will tolerate >/= 40-45mins ther ex/act with minimal rest breaks to improve endurance and activity tolerance  -progressing (28 min)  Long term goal 5: Pt will safely ambulate household distances with LRAD to improve functional ambulation throughout house -met  Patient Goals   Patient goals :  \"To walk farther and not use chair as much; use cane\"    Plan:    Plan  Plan weeks: 4-6        Therapy Time:   Individual Concurrent Group Co-treatment   Time In 1230         Time Out 1302         Minutes [facility], PTA

## 2022-07-20 NOTE — PLAN OF CARE
[facility]           Phone: [phone]             Outpatient Physical Therapy  Fax: [phone]                                           Date: 2022  Patient: Christa Simpson : 1932 CSN #: [id number]   Referring Physician: Mirna Harden DO      [] Plan of Care   [x] Updated Plan of Care    Dates of Service to Include: 2022 to 22    Diagnosis:     Leg Weakness  Rehab (Treatment) Diagnosis:  generalized weakness, difficulty walking             Onset Date:  22    Attendance  Total # of Visits to Date: 7 No Show: 0 Canceled Appointment: 0    Assessment  Assessment: Pt has made mild progress with functional mobility and strength increases through therex in the pool. Pt is mainly limited by fatigue at this point in time and would like to focus on endurance. Plan to increase frequency to 2x/week for this PoC to focus on improving endurance and overall activity tolerance as well as strength and functional mobility. Goals  Short Term Goals  Time Frame for Short term goals: 3 weeks  Short term goal 1: Pt will initiate HEP-met  Short term goal 2: Pt will initiate aquatic therapy program to assist with improving LE strengthening and endurance-met  Long Term Goals  Time Frame for Long term goals : 6 weeks  Long term goal 1: Pt will be independent and compliant with HEP-  not met  Long term goal 2: Pt will safely perform TUG with LRAD in </= 20 seconds to decrease risk of falls.   Long term goal 3: Pt will be able to complete 10' Walk test in </= 10s to improve safety with community ambulation  Long term goal 4: Pt will tolerate >/= 40-45mins ther ex/act with minimal rest breaks to improve endurance and activity tolerance  -progressing (28 min)  Long term goal 5: Pt will safely ambulate household distances with LRAD to improve functional ambulation throughout house -met     Prognosis   Therapy Prognosis: Good    Treatment Plan   Plan Frequency: 2x/week  Plan weeks: 4-6  [] HP/CP      [] Electrical Stim   [x] Therapeutic Exercise      [x] Gait Training  [x] Aquatics   [] Ultrasound         [x] Patient Education/HEP   [] Manual Therapy  [] Traction    [x] Neuro-jolie        [] Soft Tissue Mobs            [] Home TENS  [] Iontophoresis    [] Orthotic casting/fitting      [] Dry Needling             Electronically signed by: Dat Fung PT, DPT    Date: 7/18/2022      ______________________________________ Date: 7/18/2022   Physician Signature

## 2022-07-25 ENCOUNTER — HOSPITAL ENCOUNTER (OUTPATIENT)
Dept: PHYSICAL THERAPY | Age: 87
Setting detail: THERAPIES SERIES
Discharge: HOME | End: 2022-07-25
Payer: MEDICARE

## 2022-07-25 PROCEDURE — 97113 AQUATIC THERAPY/EXERCISES: CPT

## 2022-07-25 NOTE — PROGRESS NOTES
Physical Therapy  Daily Treatment Note  Date: 2022  Patient Name: Gerri Connors  MRN: [id number]     :   1932    Referring Physician: Marquita Nuno DO     PCP: Cesilia Sal DO    Medical Diagnosis: Leg weakness [R29.898]    Treatment Diagnosis: generalized weakness, difficulty walking      Insurance: Payor: MEDICARE / Plan: MEDICARE PART A AND B / Product Type: *No Product type* /   Insurance ID: [id number] - (Medicare)    Subjective:   PT Visit Information  Onset Date: 22  Total # of Visits Approved: 20  Total # of Visits to Date: 8  Plan of Care/Certification Expiration Date: 22  No Show: 0  Canceled Appointment: 0  Subjective  Subjective: Pt reports fatigue/soreness today in BLE's. He states he was at the store prior to today's session, he states he rode on the electric cart. Pt caregiver reports no new changes, she states she was able to get pt scheduled 2x week the next few weeks. Treatment Activities:  Exercises:  Aquatic therapy (CPT 66072)   Treatment Reasoning    Exercise 1: HEP: standing tolerance / transfer practice  Exercise 2: >> aquatic therapy program for LE strengthening and endurance  Exercise 3: AQ; water walk x3 laps fwd/lateral/retro in deepest level  Exercise 4: AQ; sink x10 ea  Exercise 9: AQ:  forward step up x10 in deeper water     To increase strength/RoM in an unloaded environment                      Assessment:   Conditions Requiring Skilled Therapeutic Intervention  Assessment: Pt was progressed with forward step ups in deeper water with pt fatiguing after ~5 reps on each. Pt continues to display limited endurance with LE exercise and took multiple,short standing RBs. Pt encouraged to increase activity at home. Pt still tolerates ~30 min of aquatic therapy. Will continue as tolerated.   Treatment Diagnosis: generalized weakness, difficulty walking  Activity Tolerance  Activity Tolerance: Patient tolerated treatment well       Goals:  Short Term Goals  Time Frame for Short term goals: 3 weeks  Short term goal 1: Pt will initiate HEP-met  Short term goal 2: Pt will initiate aquatic therapy program to assist with improving LE strengthening and endurance-met  Long Term Goals  Time Frame for Long term goals : 6 weeks  Long term goal 1: Pt will be independent and compliant with HEP-  not met  Long term goal 2: Pt will safely perform TUG with LRAD in </= 20 seconds to decrease risk of falls. Long term goal 3: Pt will be able to complete 10' Walk test in </= 10s to improve safety with community ambulation  Long term goal 4: Pt will tolerate >/= 40-45mins ther ex/act with minimal rest breaks to improve endurance and activity tolerance  -progressing (28 min)  Long term goal 5: Pt will safely ambulate household distances with LRAD to improve functional ambulation throughout house -met  Patient Goals   Patient goals :  \"To walk farther and not use chair as much; use cane\"    Plan:    Plan  Plan weeks: 4-6     1-2x/week    Therapy Time:   Individual Concurrent Group Co-treatment   Time In 1244         Time Out 1313         Minutes 226 [facility]

## 2022-08-03 ENCOUNTER — HOSPITAL ENCOUNTER (OUTPATIENT)
Dept: PHYSICAL THERAPY | Age: 87
Setting detail: THERAPIES SERIES
Discharge: HOME | End: 2022-08-03
Payer: MEDICARE

## 2022-08-03 PROCEDURE — 97113 AQUATIC THERAPY/EXERCISES: CPT

## 2022-08-03 NOTE — PROGRESS NOTES
Phone: Miya           Fax: [phone]                           Outpatient Physical Therapy                                                                            Daily Note    Patient: Levi Garner : 1932  CSN #: [id number]   Referring Physician: Julio Boyer DO    Date: 8/3/2022       Treatment Diagnosis: generalized weakness, difficulty walking    Onset Date: 22  Total # of Visits Approved: 20 Per Physician Order  Total # of Visits to Date: 9  No Show: 0  Canceled Appointment: 0      Pre-Treatment Pain:  0/10  Subjective: Pt reports usual fatigue/aching in LEs. He states he had done a lot of walking so far today. Pt caregiver states she has been motivating pt more frequently to increase activity. Exercises:  Exercise 1: HEP: standing tolerance / transfer practice  Exercise 2: >> aquatic therapy program for LE strengthening and endurance  Exercise 3: AQ; water walk 2x5 laps fwd/lateral/retro in deepest level  Exercise 4: AQ; sink x10 ea - chest level water  Exercise 9: AQ:  forward step up x10 in deeper water    Assessment  Assessment: Pt progressed quite well today in the pool, he was able to complete 2x5 laps of fwd walking today as compared to ~3 laps in the previous sessions. He also tolerated 10 reps with forward step ups today in deeper water. He tolerated 35 min of continuous exercise today. WIll continue with aquatics for increased strength/ROM in an unloaded environment. Activity Tolerance  Activity Tolerance: Patient tolerated treatment well    Patient Education  Ex progression, increased activity at home  Pt verbalized/demonstrated good understanding:     [x] Yes         [] No, pt required further clarification.        Post Treatment Pain:  0/10      Plan  Plan Frequency: 2x/week  Plan weeks: 4-6       Goals  (Total # of Visits to Date: 5)      Short Term Goals  Time Frame for Short term goals: 3 weeks  Short term goal 1: Pt will initiate HEP-met  Short term goal 2: Pt will initiate aquatic therapy program to assist with improving LE strengthening and endurance-met    Long Term Goals  Time Frame for Long term goals : 6 weeks  Long term goal 1: Pt will be independent and compliant with HEP-  not met  Long term goal 2: Pt will safely perform TUG with LRAD in </= 20 seconds to decrease risk of falls.   Long term goal 3: Pt will be able to complete 10' Walk test in </= 10s to improve safety with community ambulation  Long term goal 4: Pt will tolerate >/= 40-45mins ther ex/act with minimal rest breaks to improve endurance and activity tolerance  -progressing (28 min)  Long term goal 5: Pt will safely ambulate household distances with LRAD to improve functional ambulation throughout house -met    Minutes Tracking:  Time In: [address]  Time Out: 65  Minutes: [address] [facility], [facility] [place]     Date: 8/3/2022

## 2022-08-05 ENCOUNTER — HOSPITAL ENCOUNTER (OUTPATIENT)
Dept: PHYSICAL THERAPY | Age: 87
Setting detail: THERAPIES SERIES
Discharge: HOME | End: 2022-08-05
Payer: MEDICARE

## 2022-08-05 PROCEDURE — 97113 AQUATIC THERAPY/EXERCISES: CPT

## 2022-08-05 NOTE — PROGRESS NOTES
Phone: Miya           Fax: [phone]                           Outpatient Physical Therapy                                                                            Daily Note    Patient: Miguelangel Knight : 1932  [facility] #: [id number]   Referring Physician: Omid Romo DO    Date: 2022       Treatment Diagnosis: generalized weakness, difficulty walking    Onset Date: 22  Total # of Visits Approved: 20 Per Physician Order  Total # of Visits to Date: 10  No Show: 0  Canceled Appointment: 0      Pre-Treatment Pain:  0/10  Subjective: Pt reports same aching in LEs. His caregiver reports no compliants after progressions from last visit. Exercises:  Exercise 1: HEP: standing tolerance / transfer practice  Exercise 2: >> aquatic therapy program for LE strengthening and endurance  Exercise 3: AQ; water walk x5 laps deep; towards end of session pt completed x3 laps fwd and 2 laps sidestepping in shallow side. Exercise 4: AQ; sink x10 ea - waist level water  Exercise 6: AQ:  sit to stand x5    Assessment  Assessment: Pt able to progress to ambulating in shallow side of pool for a total of 5 laps (3 fwd and 2 lateral). His caregiver states he is able to walk more around his home and she is hopeful that doing pool therapy 2x/week now will progress him further. Pt continues to be limited by fatigue but is taking shorter rest breaks as compared to 2 weeks ago. Pt met LTG of being able to tolerate 40 min of aquatic exercise. Will continue with aquatics for increased strength/ROM in an unloaded environment. Activity Tolerance  Activity Tolerance: Patient tolerated treatment well    Patient Education  Ex technique, progression and increasing ambulation distance home    Pt verbalized/demonstrated good understanding:     [x] Yes         [] No, pt required further clarification.        Post Treatment Pain:  0/10      Plan  Plan Frequency: 2x/week  Plan weeks: 4-6       Goals  (Total # of Visits to Date: 8)      Short Term Goals  Time Frame for Short term goals: 3 weeks  Short term goal 1: Pt will initiate HEP-met  Short term goal 2: Pt will initiate aquatic therapy program to assist with improving LE strengthening and endurance-met    Long Term Goals  Time Frame for Long term goals : 6 weeks  Long term goal 1: Pt will be independent and compliant with HEP-  not met  Long term goal 2: Pt will safely perform TUG with LRAD in </= 20 seconds to decrease risk of falls.   Long term goal 3: Pt will be able to complete 10' Walk test in </= 10s to improve safety with community ambulation  Long term goal 4: Pt will tolerate >/= 40-45mins ther ex/act with minimal rest breaks to improve endurance and activity tolerance  -progressing (35 min)  Long term goal 5: Pt will safely ambulate household distances with LRAD to improve functional ambulation throughout house -met    Minutes Tracking:  Time In: 1259  Time Out: [address]  Minutes: 43  Timed Code Treatment Minutes: [address], PTA     Date: 8/5/2022

## 2022-08-08 ENCOUNTER — HOSPITAL ENCOUNTER (OUTPATIENT)
Dept: PHYSICAL THERAPY | Age: 87
Setting detail: THERAPIES SERIES
Discharge: HOME | End: 2022-08-08
Payer: MEDICARE

## 2022-08-08 PROCEDURE — 97113 AQUATIC THERAPY/EXERCISES: CPT

## 2022-08-08 NOTE — PROGRESS NOTES
Phone: [phone]                 [facility]           Fax: [phone]                           Outpatient Physical Therapy                                                                            Daily Note    Patient: Aure Stein : 1932  CSN #: [id number]   Referring Physician: Preston Ruth DO    Date: 2022       Treatment Diagnosis: generalized weakness, difficulty walking    Onset Date: 22  Total # of Visits Approved: 20 Per Physician Order  Total # of Visits to Date: 11  No Show: 0  Canceled Appointment: 0      Pre-Treatment Pain:  0/10  Subjective: Pt reports no changes. His caregiver states pt had no compliants of doing PT twice last week. Pt states his brother passed away yesterday. Exercises:  Exercise 1: HEP: standing tolerance / transfer practice  Exercise 2: >> aquatic therapy program for LE strengthening and endurance  Exercise 3: AQ; water walk x5 laps deep; x4 laps fwd in shallow water  Exercise 4: AQ; sink x10 ea - waist level water    Assessment  Assessment: Pt completed most of tx in shallow water today with more frequent RBs. He did complete some walking prior to start of session so more fatigued upon therapist arrival.  Pt had some difficulty with sink ex on shallow side of water so he completed in deeper water to reduce UE/LE shakiness. Pt limited with time, he states he needs to go to the  office after this to change his power of  d/t him stating his son \"is trying to put me in a nursing home. \"  WIll continue with aquatics for increased strength/ROM in an unloaded envirnoment. Activity Tolerance  Activity Tolerance: Patient tolerated treatment well    Patient Education  Ex technique, rationale and progression  Pt verbalized/demonstrated good understanding:     [x] Yes         [] No, pt required further clarification.        Post Treatment Pain:  0/10      Plan  Plan Frequency: 2x/week  Plan weeks: 4-6       Goals  (Total # of Visits to Date: 6)      Short Term Goals  Time Frame for Short term goals: 3 weeks  Short term goal 1: Pt will initiate HEP-met  Short term goal 2: Pt will initiate aquatic therapy program to assist with improving LE strengthening and endurance-met    Long Term Goals  Time Frame for Long term goals : 6 weeks  Long term goal 1: Pt will be independent and compliant with HEP-  not met  Long term goal 2: Pt will safely perform TUG with LRAD in </= 20 seconds to decrease risk of falls.   Long term goal 3: Pt will be able to complete 10' Walk test in </= 10s to improve safety with community ambulation  Long term goal 4: Pt will tolerate >/= 40-45mins ther ex/act with minimal rest breaks to improve endurance and activity tolerance  -progressing (35 min)  Long term goal 5: Pt will safely ambulate household distances with LRAD to improve functional ambulation throughout house -met    Minutes Tracking:  Time In: 1335  Time Out: 1400  Minutes: [address] [facility], [facility]     Date: 8/8/2022

## 2022-08-11 ENCOUNTER — HOSPITAL ENCOUNTER (OUTPATIENT)
Dept: PHYSICAL THERAPY | Age: 87
Setting detail: THERAPIES SERIES
Discharge: HOME | End: 2022-08-11
Payer: MEDICARE

## 2022-08-11 PROCEDURE — 97113 AQUATIC THERAPY/EXERCISES: CPT

## 2022-08-11 NOTE — PROGRESS NOTES
Phone: Miya           Fax: [phone]                           Outpatient Physical Therapy                                                                            Daily Note    Patient: Lidya Arana : 1932  CSN #: [id number]   Referring Physician: Maris Anderson DO    Date: 2022       Treatment Diagnosis: generalized weakness, difficulty walking    Onset Date: 22  Total # of Visits Approved: 20 Per Physician Order  Total # of Visits to Date: 12  No Show: 0  Canceled Appointment: 0      Pre-Treatment Pain:  0/10  Subjective: Pt careigver states she started giving pt Tylenol PRN d/t pt having c/o soreness in LEs which he states has helped. She gave him Tylenol prior to appt today as well to reduce DOMS. Pt caregiver states she has been encouraging him to walk more. Pt has no compliants today. Exercises:  Exercise 1: HEP: standing tolerance / transfer practice  Exercise 2: >> aquatic therapy program for LE strengthening and endurance  Exercise 3: AQ; water walk x5 laps deep; x4 laps fwd in shallow water  Exercise 4: AQ; sink x10 ea - waist level water  Exercise 6: AQ:  sit to stand x10  Exercise 8: AQ: jet massage to decrease tone and pain in low back with /march x15  Exercise 9: AQ:  forward step up x10 in deeper water;  grey TBand retract/LAE x10 each    Manual:       Modalities:       Assessment  Assessment: pt was able to progress this date with fair tolerance overall . He's showing increased tolerance to aquatic exercise. He completed 10 sit to stands which he only tolerated 5 in the previous sessions. He completed 15 reps each LE of fwd step ups in deeper water with 1 short RB before switching LE's. Added theraband retracts/LAE to progress core stability. Pt tolreated ~10 reps before squatting down into the water to rest.  Pt tolerated 35 min of aquatic exercises today.   Will continue with aquatics for increased strength/ROM in an unloaded enviornment. Activity Tolerance  Activity Tolerance: Patient tolerated treatment well    Patient Education  Aquatic ex progression. Ex technique. Pt verbalized/demonstrated good understanding:     [x] Yes         [] No, pt required further clarification. Post Treatment Pain:  0/10      Plan  Plan Frequency: 2x/week  Plan weeks: 4-6       Goals  (Total # of Visits to Date: 15)      Short Term Goals  Time Frame for Short term goals: 3 weeks  Short term goal 1: Pt will initiate HEP-met  Short term goal 2: Pt will initiate aquatic therapy program to assist with improving LE strengthening and endurance-met    Long Term Goals  Time Frame for Long term goals : 6 weeks  Long term goal 1: Pt will be independent and compliant with HEP-  not met  Long term goal 2: Pt will safely perform TUG with LRAD in </= 20 seconds to decrease risk of falls.   Long term goal 3: Pt will be able to complete 10' Walk test in </= 10s to improve safety with community ambulation  Long term goal 4: Pt will tolerate >/= 40-45mins ther ex/act with minimal rest breaks to improve endurance and activity tolerance  -progressing (35 min)  Long term goal 5: Pt will safely ambulate household distances with LRAD to improve functional ambulation throughout house -met    Minutes Tracking:  Time In: 1012  Time Out: 1048  Minutes: 36  Timed Code Treatment Minutes: [address] [facility], [facility]     Date: 8/11/2022

## 2022-08-12 ENCOUNTER — HOSPITAL ENCOUNTER (OUTPATIENT)
Dept: PHYSICAL THERAPY | Age: 87
Setting detail: THERAPIES SERIES
End: 2022-08-12
Payer: MEDICARE

## 2022-08-15 ENCOUNTER — HOSPITAL ENCOUNTER (OUTPATIENT)
Dept: PHYSICAL THERAPY | Age: 87
Setting detail: THERAPIES SERIES
Discharge: HOME | End: 2022-08-15
Payer: MEDICARE

## 2022-08-15 PROCEDURE — 97113 AQUATIC THERAPY/EXERCISES: CPT

## 2022-08-15 NOTE — PROGRESS NOTES
Phone: Miya           Fax: [phone]                           Outpatient Physical Therapy                                                                            Daily Note    Patient: Jose Raul Jose : 1932  CSN #: [id number]   Referring Physician: Zeke Corona DO    Date: 8/15/2022       Treatment Diagnosis: generalized weakness, difficulty walking    Onset Date: 22  Total # of Visits Approved: 20 Per Physician Order  Total # of Visits to Date: 13  No Show: 0  Canceled Appointment: 0      Pre-Treatment Pain:  not stated  Subjective: Pt reports \"not feeling too bad for an old man\". He reports usual LE fatigue/aching. Caregiver reports pt c/o back pain on the way to today's appt. She did report pt said he felt good after his last visit and she states he did a lot of walking after last visit and pt tolerated well. Exercises:  Exercise 1: HEP: standing tolerance / transfer practice  Exercise 2: >> aquatic therapy program for LE strengthening and endurance  Exercise 3: AQ; water walk x3 laps deep; 2x4 laps fwd in shallow water (pt took a short RB in water after every ~1-2 laps d/t fatigue)  Exercise 4: AQ; sink x10 ea - waist level water  Exercise 6: AQ:  sit to stand x15  Exercise 8: AQ: jet massage to decrease tone and pain in low back with /march x15  Exercise 9: AQ:  forward step up x10 in deeper water;  grey TBand retract/LAE x10 each      Assessment  Assessment: Had pt reduce amount of rest breaks today as able with good tolerance, he tolerated 30 min of exercise. He did tolerate well today and caregiver/patient verbalize noting improvements in strength/endurance lately. He didn't complain of back pain during tx. Cues on proper exercise technique t/o session. WIll continue with aquatics for increased strength/ROM in an unloaded environment.     Activity Tolerance  Activity Tolerance: Patient tolerated treatment well    Patient Education  Ex technique, progression and rationale    Pt verbalized/demonstrated good understanding:     [x] Yes         [] No, pt required further clarification. Post Treatment Pain:  not stated      Plan  Plan Frequency: 2x/week  Plan weeks: 4-6       Goals  (Total # of Visits to Date: 15)      Short Term Goals  Time Frame for Short term goals: 3 weeks  Short term goal 1: Pt will initiate HEP-met  Short term goal 2: Pt will initiate aquatic therapy program to assist with improving LE strengthening and endurance-met    Long Term Goals  Time Frame for Long term goals : 6 weeks  Long term goal 1: Pt will be independent and compliant with HEP-  not met  Long term goal 2: Pt will safely perform TUG with LRAD in </= 20 seconds to decrease risk of falls.   Long term goal 3: Pt will be able to complete 10' Walk test in </= 10s to improve safety with community ambulation  Long term goal 4: Pt will tolerate >/= 40-45mins ther ex/act with minimal rest breaks to improve endurance and activity tolerance  -progressing (35 min)  Long term goal 5: Pt will safely ambulate household distances with LRAD to improve functional ambulation throughout house -met    Minutes Tracking:  Time In: 1244  Time Out: [address] [facility]  Minutes: [address], PTA     Date: 8/15/2022

## 2022-08-19 ENCOUNTER — HOSPITAL ENCOUNTER (OUTPATIENT)
Dept: PHYSICAL THERAPY | Age: 87
Setting detail: THERAPIES SERIES
Discharge: HOME | End: 2022-08-19
Payer: MEDICARE

## 2022-08-19 PROCEDURE — 97113 AQUATIC THERAPY/EXERCISES: CPT

## 2022-08-19 NOTE — PROGRESS NOTES
technique and increased activity at home  Pt verbalized/demonstrated good understanding:     [x] Yes         [] No, pt required further clarification. Post Treatment Pain:  0/10      Plan  Plan Frequency: 2x/week  Plan weeks: 4-6       Goals  (Total # of Visits to Date: 15)      Short Term Goals  Time Frame for Short term goals: 3 weeks  Short term goal 1: Pt will initiate HEP-met  Short term goal 2: Pt will initiate aquatic therapy program to assist with improving LE strengthening and endurance-met    Long Term Goals  Time Frame for Long term goals : 6 weeks  Long term goal 1: Pt will be independent and compliant with HEP-  not met  Long term goal 2: Pt will safely perform TUG with LRAD in </= 20 seconds to decrease risk of falls.   Long term goal 3: Pt will be able to complete 10' Walk test in </= 10s to improve safety with community ambulation  Long term goal 4: Pt will tolerate >/= 40-45mins ther ex/act with minimal rest breaks to improve endurance and activity tolerance  -progressing (35 min)  Long term goal 5: Pt will safely ambulate household distances with LRAD to improve functional ambulation throughout house -met    Minutes Tracking:  Time In: 1104  Time Out: 301 [facility]  Minutes: [address] E, PTA     Date: 8/19/2022

## 2022-08-22 ENCOUNTER — HOSPITAL ENCOUNTER (OUTPATIENT)
Dept: PHYSICAL THERAPY | Age: 87
Setting detail: THERAPIES SERIES
Discharge: HOME | End: 2022-08-22
Payer: MEDICARE

## 2022-08-22 PROCEDURE — 97113 AQUATIC THERAPY/EXERCISES: CPT

## 2022-08-22 NOTE — PROGRESS NOTES
Phone: Miya           Fax: [phone]                           Outpatient Physical Therapy                                                                            Daily Note    Patient: Carla Maguire : 1932  CSN #: [id number]   Referring Physician: Breana Hamlin DO    Date: 2022       Treatment Diagnosis: generalized weakness, difficulty walking    Onset Date: 22  Total # of Visits Approved: 20 Per Physician Order  Total # of Visits to Date: 15  No Show: 0  Canceled Appointment: 0      Pre-Treatment Pain:  0/10  Subjective: Pt caregiver report pt has been walking a lot today so far and \"hasn't complianed yet. \"  Pt reports he feels like he's \"going to sleep real good tonight. \"  Pt has no compliants currently    Exercises:  Exercise 1: HEP: standing tolerance / transfer practice  Exercise 3: AQ; water walk x3 laps shallow fwd, lateral and retro; 2x4 laps fwd in shallow water (pt took a short RB in water after every 2 laps d/t fatigue)  Exercise 4: AQ; sink x10 ea - waist level water  Exercise 6: AQ:  sit to stand x10  Exercise 7: AQ: blue bar pull down to waist x15 - both with posterior support. Exercise 8: AQ: jet massage to decrease tone and pain in low back with LAQ/march x15  Exercise 9: AQ:  forward step up x10 in deeper water;  grey TBand retract/LAE x10 each        Assessment  Assessment: Pt continues to display increased tolerance with functional strengthening in pool today. He took multiple short RBs but they range ~10-15 seconds. Pt caregiver states she is amazed with seeing pt's improved tolerance with ADls and being out in community. WIll continue with aquatics for increase strength/ROM in an unloaded environment.     Activity Tolerance  Activity Tolerance: Patient tolerated treatment well    Patient Education  Ex technique, rationale, progression  Pt verbalized/demonstrated good understanding:     [x] Yes         [] No, pt required further clarification. Post Treatment Pain:  0/10      Plan  Plan Frequency: 2x/week  Plan weeks: 4-6       Goals  (Total # of Visits to Date: 13)      Short Term Goals  Time Frame for Short term goals: 3 weeks  Short term goal 1: Pt will initiate HEP-met  Short term goal 2: Pt will initiate aquatic therapy program to assist with improving LE strengthening and endurance-met    Long Term Goals  Time Frame for Long term goals : 6 weeks  Long term goal 1: Pt will be independent and compliant with HEP-  not met  Long term goal 2: Pt will safely perform TUG with LRAD in </= 20 seconds to decrease risk of falls.   Long term goal 3: Pt will be able to complete 10' Walk test in </= 10s to improve safety with community ambulation  Long term goal 4: Pt will tolerate >/= 40-45mins ther ex/act with minimal rest breaks to improve endurance and activity tolerance  -progressing (35 min)  Long term goal 5: Pt will safely ambulate household distances with LRAD to improve functional ambulation throughout house -met    Minutes Tracking:  Time In: 1315  Time Out: 229 [facility]  Minutes: [address] Rainey, ELPIDIO     Date: 8/22/2022

## 2022-08-26 ENCOUNTER — HOSPITAL ENCOUNTER (OUTPATIENT)
Dept: PHYSICAL THERAPY | Age: 87
Setting detail: THERAPIES SERIES
Discharge: HOME | End: 2022-08-26
Payer: MEDICARE

## 2022-08-26 NOTE — PROGRESS NOTES
[facility]  Inpatient/Observation/Outpatient Rehabilitation    Date: 2022  Patient Name: Chandra Lowe       [] Inpatient Acute/Observation       [x]  Outpatient  : 1932     Pt entered pool  15min early today. He entered pool and walked 1-2 laps then sat on the  bench with the jets per therapist request until his treatment time. Pt then stated \"I just need to get out, my legs hurt so bad\" \"I can't do it today. \"  Per caregiver, she states pt had Tylenol prior to appt but she thinks he's still wore out from walking a lot the last few days.   Will cancel this appt      Fausto Borjas, PTA Date: 2022

## 2022-08-29 ENCOUNTER — HOSPITAL ENCOUNTER (OUTPATIENT)
Dept: PHYSICAL THERAPY | Age: 87
Setting detail: THERAPIES SERIES
Discharge: HOME | End: 2022-08-29
Payer: MEDICARE

## 2022-08-29 PROCEDURE — 97113 AQUATIC THERAPY/EXERCISES: CPT

## 2022-08-29 NOTE — PROGRESS NOTES
Phone: Miya           Fax: [phone]                           Outpatient Physical Therapy                                                                            Daily Note    Patient: Krista Rodriguez : 1932  CSN #: [id number]   Referring Physician: Wicho Manrique DO    Date: 2022       Treatment Diagnosis: generalized weakness, difficulty walking    Onset Date: 22  Total # of Visits Approved: 20 Per Physician Order  Total # of Visits to Date: 16  No Show: 0  Canceled Appointment: 1      Pre-Treatment Pain:  0/10  Subjective: Pt reports legs are aching today. Caregiver reports pt did more walking over the weekend and came to her pool yesterday and floated for ~3-4 hours. Exercises:  Exercise 1: HEP: standing tolerance / transfer practice  Exercise 2: >> aquatic therapy program for LE strengthening and endurance  Exercise 3: AQ; water walk x3 laps shallow fwd, lateral and retro; x4 laps fwd in shallow water (pt took a short RB in water after every  lap d/t fatigue)  Exercise 4: AQ; sink x10 ea - waist level water--- only march, hip flex and squat today  Exercise 9: AQ:  forward step up x10 in deeper water;  grey TBand retract/LAE x10 each      Assessment  Assessment: Pt was quick to fatigue today, took more frequent RBs d/t this. Pt caregiver states after his last appt that he cancelled, pt had went home and slept most of the day. Pt caregiver would like to schedule more visits but states she needs to make sure she can get her work schedule figured out first and will call today/tomorrow. Will continue with aquatics for increased strength/ROM in an unloaded environment. Activity Tolerance  Activity Tolerance: Patient tolerated treatment well    Patient Education  Ex technique, continued activity at home   Pt verbalized/demonstrated good understanding:     [x] Yes         [] No, pt required further clarification.        Post Treatment Pain: 0 /10      Plan  Plan Frequency: 2x/week  Plan weeks: 4-6       Goals  (Total # of Visits to Date: 12)      Short Term Goals  Time Frame for Short term goals: 3 weeks  Short term goal 1: Pt will initiate HEP-met  Short term goal 2: Pt will initiate aquatic therapy program to assist with improving LE strengthening and endurance-met    Long Term Goals  Time Frame for Long term goals : 6 weeks  Long term goal 1: Pt will be independent and compliant with HEP-  not met  Long term goal 2: Pt will safely perform TUG with LRAD in </= 20 seconds to decrease risk of falls.   Long term goal 3: Pt will be able to complete 10' Walk test in </= 10s to improve safety with community ambulation  Long term goal 4: Pt will tolerate >/= 40-45mins ther ex/act with minimal rest breaks to improve endurance and activity tolerance  -progressing (35 min)  Long term goal 5: Pt will safely ambulate household distances with LRAD to improve functional ambulation throughout house -met    Minutes Tracking:  Time In: 1047  Time Out: 1120  Minutes: [address], PTA     Date: 8/29/2022

## 2022-08-30 NOTE — PLAN OF CARE
[facility]           Phone: [phone]             Outpatient Physical Therapy  Fax: [phone]                                           Date: 2022  Patient: Jose Raul Jose : 1932 CSN #: [id number]   Referring Physician: Zeke Corona DO      [] Plan of Care   [x] Updated Plan of Care    Dates of Service to Include: 2022 to 10/10/22    Diagnosis:  Leg weakness    Rehab (Treatment) Diagnosis:  generalized weakness, difficulty walking             Onset Date:  22    Attendance  Total # of Visits to Date: 16 No Show: 0 Canceled Appointment: 1    Assessment  Assessment: Pt has been showing improvement with activity tolerance and functional strength with transfers and mobility both in and out of the pool. Pt has been making slow but steady gains in BLE and core strength as seen by his functional mobility and subjective reports of pain with ADLs. Plan to continue aquatic based therapy to improve functional strength and mobility to improve safety and efficiency with community mobility. Goals  Short Term Goals  Time Frame for Short term goals: 3 weeks  Short term goal 1: Pt will initiate HEP-met  Short term goal 2: Pt will initiate aquatic therapy program to assist with improving LE strengthening and endurance-met  Long Term Goals  Time Frame for Long term goals : 6 weeks  Long term goal 1: Pt will be independent and compliant with HEP-  not met  Long term goal 2: Pt will safely perform TUG with LRAD in </= 20 seconds to decrease risk of falls.   Long term goal 3: Pt will be able to complete 10' Walk test in </= 10s to improve safety with community ambulation  Long term goal 4: Pt will tolerate >/= 40-45mins ther ex/act with minimal rest breaks to improve endurance and activity tolerance  -progressing (35 min)  Long term goal 5: Pt will safely ambulate household distances with LRAD to improve functional ambulation throughout house -met     Prognosis       Treatment Plan   Plan Frequency: 2x/week  Plan weeks: 4-6  [] HP/CP      [] Electrical Stim   [x] Therapeutic Exercise      [x] Gait Training  [x] Aquatics   [] Ultrasound         [x] Patient Education/HEP   [] Manual Therapy  [] Traction    [x] Neuro-jolie        [] Soft Tissue Mobs            [] Home TENS  [] Iontophoresis    [] Orthotic casting/fitting      [] Dry Needling             Electronically signed by: Maia Saavedra PT DPT    Date: 8/29/2022      ______________________________________ Date: 8/29/2022   Physician Signature

## 2022-09-09 ENCOUNTER — HOSPITAL ENCOUNTER (OUTPATIENT)
Dept: PHYSICAL THERAPY | Age: 87
Setting detail: THERAPIES SERIES
Discharge: HOME | End: 2022-09-09
Payer: MEDICARE

## 2022-09-09 PROCEDURE — 97113 AQUATIC THERAPY/EXERCISES: CPT

## 2022-09-09 NOTE — PROGRESS NOTES
verbalized/demonstrated good understanding:     [x] Yes         [] No, pt required further clarification. Post Treatment Pain:  0/10      Plan  Plan Frequency: 2x/week  Plan weeks: 4-6       Goals  (Total # of Visits to Date: 16)      Short Term Goals  Time Frame for Short term goals: 3 weeks  Short term goal 1: Pt will initiate HEP-met  Short term goal 2: Pt will initiate aquatic therapy program to assist with improving LE strengthening and endurance-met    Long Term Goals  Time Frame for Long term goals : 6 weeks  Long term goal 1: Pt will be independent and compliant with HEP-  not met  Long term goal 2: Pt will safely perform TUG with LRAD in </= 20 seconds to decrease risk of falls.   Long term goal 3: Pt will be able to complete 10' Walk test in </= 10s to improve safety with community ambulation  Long term goal 4: Pt will tolerate >/= 40-45mins ther ex/act with minimal rest breaks to improve endurance and activity tolerance  -progressing (35 min)  Long term goal 5: Pt will safely ambulate household distances with LRAD to improve functional ambulation throughout house -met    Minutes Tracking:  Time In: [address]  Time Out: 1010  Minutes: [address] [place], [place]     Date: 9/9/2022

## 2022-09-12 ENCOUNTER — HOSPITAL ENCOUNTER (OUTPATIENT)
Dept: PHYSICAL THERAPY | Age: 87
Setting detail: THERAPIES SERIES
Discharge: HOME | End: 2022-09-12
Payer: MEDICARE

## 2022-09-12 PROCEDURE — 97113 AQUATIC THERAPY/EXERCISES: CPT

## 2022-09-12 NOTE — PROGRESS NOTES
Phone: Miya           Fax: [phone]                           Outpatient Physical Therapy                                                                            Daily Note    Patient: Vandana Estrella : 1932  CSN #: [id number]   Referring Physician: Adriana Purcell DO    Date: 2022       Treatment Diagnosis: generalized weakness, difficulty walking    Onset Date: 22  PT Insurance Information: Medicare  Total # of Visits Approved: 28 Per Physician Order  Total # of Visits to Date: 18  No Show: 0  Canceled Appointment: 1      Pre-Treatment Pain:  0/10  Subjective: Pt reports feeling tired today. States he stayed up late last night with his son drinking wine. he states his son lives in [facility] but is visiting for a week. Exercises:  Exercise 1: HEP: standing tolerance / transfer practice  Exercise 2: >> aquatic therapy program for LE strengthening and endurance  Exercise 3: AQ; water walk x3 laps shallow fwd, lateral and retro; x4 laps fwd in shallow water (pt took a short RB in water after every  lap d/t fatigue)  Exercise 4: AQ; sink x10-15 ea - waist level water--- heel/toe raises, marching, hip abd, knee flexion, and partial squats. Exercise 9: AQ:  forward step up/LSU x10 in deeper water;  grey TBand retract/LAE x10 each    Assessment  Assessment: Pt was more quick to fatigue today. He took more frequent, but short RBs throughout. Able to progress to lateral step ups today. Will continue with aquatics for increased strength/ROM in an unloaded environment. Activity Tolerance  Activity Tolerance: Patient tolerated treatment well, Patient limited by fatigue    Patient Education  Ex technique, continued increased activity at home  Pt verbalized/demonstrated good understanding:     [x] Yes         [] No, pt required further clarification.        Post Treatment Pain: 0 /10      Plan  Plan Frequency: 2x/week  Plan weeks: 4-6       Goals (Total # of Visits to Date: 25)      Short Term Goals  Time Frame for Short term goals: 3 weeks  Short term goal 1: Pt will initiate HEP-met  Short term goal 2: Pt will initiate aquatic therapy program to assist with improving LE strengthening and endurance-met    Long Term Goals  Time Frame for Long term goals : 6 weeks  Long term goal 1: Pt will be independent and compliant with HEP-  not met  Long term goal 2: Pt will safely perform TUG with LRAD in </= 20 seconds to decrease risk of falls.   Long term goal 3: Pt will be able to complete 10' Walk test in </= 10s to improve safety with community ambulation  Long term goal 4: Pt will tolerate >/= 40-45mins ther ex/act with minimal rest breaks to improve endurance and activity tolerance  -progressing (35 min)  Long term goal 5: Pt will safely ambulate household distances with LRAD to improve functional ambulation throughout house -met    Minutes Tracking:  Time In: 1115  Time Out: 7[facility]  Minutes: [address], hospitals     Date: 9/12/2022

## 2022-09-14 ENCOUNTER — HOSPITAL ENCOUNTER (OUTPATIENT)
Dept: PHYSICAL THERAPY | Age: 87
Setting detail: THERAPIES SERIES
Discharge: HOME | End: 2022-09-14
Payer: MEDICARE

## 2022-09-14 PROCEDURE — 97113 AQUATIC THERAPY/EXERCISES: CPT

## 2022-09-19 ENCOUNTER — HOSPITAL ENCOUNTER (OUTPATIENT)
Dept: PHYSICAL THERAPY | Age: 87
Setting detail: THERAPIES SERIES
Discharge: HOME | End: 2022-09-19
Payer: MEDICARE

## 2022-09-19 PROCEDURE — 97113 AQUATIC THERAPY/EXERCISES: CPT

## 2022-09-19 NOTE — PROGRESS NOTES
Phone: Miya           Fax: [phone]                           Outpatient Physical Therapy                                                                            Daily Note    Patient: Sly Finley : 1932  CSN #: [id number]   Referring Physician: Thalia Mcclure DO    Date: 2022       Treatment Diagnosis: generalized weakness, difficulty walking    Onset Date: 22  PT Insurance Information: Medicare  Total # of Visits Approved: 32 Per Physician Order  Total # of Visits to Date: 20  No Show: 0  Canceled Appointment: 1      Pre-Treatment Pain:  0/10  Subjective: Pt reports he did a lot of walking this morning. Pt caregiver states pt walked from the store out to the car without issue. She states he walked a fairly longer distance than what he's used to. Exercises:  Exercise 1: HEP: standing tolerance / transfer practice  Exercise 2: >> aquatic therapy program for LE strengthening and endurance  Exercise 3: AQ; water walk x3 laps shallow fwd, lateral and retro; x5 laps fwd in shallow water (pt took a short RB in water after every  lap d/t fatigue)  Exercise 4: AQ; sink x10-15 ea - waist level water  Exercise 9: AQ:  forward step up/LSU x10 in deeper water;  grey TBand retract/LAE x10 each  --just theraband today      Assessment  Assessment: Pt had better toleance today with aquatic program.  He completed avg of 2 laps of ambulation before very brief RB into the water. He reported aching in LEs during session. Increased reps with theraband to 2x20. Pt tolerated 35 min of aquatic exercise today with short RBs throughout. WIll progress aquatics for increased strength/ROM in an unloaded environment.     Activity Tolerance  Activity Tolerance: Patient tolerated treatment well    Patient Education  Ex technique, HEP, continued ambulation at home  Pt verbalized/demonstrated good understanding:     [x] Yes         [] No, pt required further clarification. Post Treatment Pain:  0/10      Plan  Plan Frequency: 2x/week  Plan weeks: 4-6       Goals  (Total # of Visits to Date: 21)      Short Term Goals  Time Frame for Short term goals: 3 weeks  Short term goal 1: Pt will initiate HEP-met  Short term goal 2: Pt will initiate aquatic therapy program to assist with improving LE strengthening and endurance-met    Long Term Goals  Time Frame for Long term goals : 6 weeks  Long term goal 1: Pt will be independent and compliant with HEP-  not met  Long term goal 2: Pt will safely perform TUG with LRAD in </= 20 seconds to decrease risk of falls.   Long term goal 3: Pt will be able to complete 10' Walk test in </= 10s to improve safety with community ambulation  Long term goal 4: Pt will tolerate >/= 40-45mins ther ex/act with minimal rest breaks to improve endurance and activity tolerance  -progressing (35 min)  Long term goal 5: Pt will safely ambulate household distances with LRAD to improve functional ambulation throughout house -met    Minutes Tracking:  Time In: 1409  Time Out: [address]  Minutes: ELPIDIO Cook     Date: 9/19/2022

## 2022-09-21 ENCOUNTER — HOSPITAL ENCOUNTER (OUTPATIENT)
Dept: PHYSICAL THERAPY | Age: 87
Setting detail: THERAPIES SERIES
Discharge: HOME | End: 2022-09-21
Payer: MEDICARE

## 2022-09-21 PROCEDURE — 97113 AQUATIC THERAPY/EXERCISES: CPT

## 2022-09-21 NOTE — PROGRESS NOTES
Phone: Miya           Fax: [phone]                           Outpatient Physical Therapy                                                                            Daily Note    Patient: Levi Garner : 1932  CSN #: [id number]   Referring Physician: Julio Boyer DO    Date: 2022       Treatment Diagnosis: generalized weakness, difficulty walking    Onset Date: 22  PT Insurance Information: Medicare  Total # of Visits Approved: 28 Per Physician Order  Total # of Visits to Date: 21  No Show: 0  Canceled Appointment: 1      Pre-Treatment Pain:  0/10  Subjective: Pt states he's feeling okay today. Pt caregiver states pt walked a further distance today from the store out to the car than what he's done in the past.  [facility] states he is more willing to ambulate and didn't need encouragement today like he usualy does. Exercises:  Exercise 1: HEP: standing tolerance / transfer practice  Exercise 2: >> aquatic therapy program for LE strengthening and endurance  Exercise 3: AQ; water walk x3 laps fwd in deep;  x3 each with shallow fwd, lateral and retro;  (pt took a brief RB in water after every 2 laps d/t fatigue)  Exercise 4: AQ: marches x10 each shallow      Assessment  Assessment: Pt continues to display improved tolerance with aquatic therapy. He completed 12 laps of ambulation total today and he walked a lot prior to appt today. After ~20 min he politely states \"I'm done. \"His LE's had notible shaking while exiting pool steps today. Will progress aquatics for increased strength/ROM in an unloaded environment . Activity Tolerance  Activity Tolerance: Patient limited by fatigue    Patient Education  Ex progression and progress so far. Pt verbalized/demonstrated good understanding:     [x] Yes         [] No, pt required further clarification.        Post Treatment Pain: 0 /10      Plan  Plan Frequency: 2x/week  Plan weeks: 4-6       Goals  (Total # of Visits to Date: 24)      Short Term Goals  Time Frame for Short term goals: 3 weeks  Short term goal 1: Pt will initiate HEP-met  Short term goal 2: Pt will initiate aquatic therapy program to assist with improving LE strengthening and endurance-met    Long Term Goals  Time Frame for Long term goals : 6 weeks  Long term goal 1: Pt will be independent and compliant with HEP-  not met  Long term goal 2: Pt will safely perform TUG with LRAD in </= 20 seconds to decrease risk of falls.   Long term goal 3: Pt will be able to complete 10' Walk test in </= 10s to improve safety with community ambulation  Long term goal 4: Pt will tolerate >/= 40-45mins ther ex/act with minimal rest breaks to improve endurance and activity tolerance  -progressing (35 min)  Long term goal 5: Pt will safely ambulate household distances with LRAD to improve functional ambulation throughout house -met    Minutes Tracking:  Time In: 1255  Time Out: [email or web address]  Minutes: [address] DALAL, PTA     Date: 9/21/2022

## 2022-09-28 ENCOUNTER — HOSPITAL ENCOUNTER (OUTPATIENT)
Dept: PHYSICAL THERAPY | Age: 87
Setting detail: THERAPIES SERIES
Discharge: HOME | End: 2022-09-28
Payer: MEDICARE

## 2022-09-28 PROCEDURE — 97113 AQUATIC THERAPY/EXERCISES: CPT

## 2022-09-28 NOTE — PROGRESS NOTES
Phone: Miya           Fax: [phone]                           Outpatient Physical Therapy                                                                            Daily Note    Patient: Anne Pina : 1932  CSN #: [id number]   Referring Physician: Rosie Estrella DO    Date: 2022       Treatment Diagnosis: generalized weakness, difficulty walking    Onset Date: 22  PT Insurance Information: Medicare  Total # of Visits Approved: 28 Per Physician Order  Total # of Visits to Date: 22  No Show: 0  Canceled Appointment: 1      Pre-Treatment Pain:  0/10  Subjective: Pt caregiver reports pt was more wobbly this morning when ambulating into a restuarant this morning and she had to be close to him for safety. She states pt did a lot more walking yesterday and states he was determined to walk around the entire store but she had him sit on the scooter after a while d/t fatigue. Pt had 2 aleve an hour before appt today but he c/o increased soreness in B LEs. Exercises:  Exercise 1: HEP: standing tolerance / transfer practice  Exercise 2: >> aquatic therapy program for LE strengthening and endurance  Exercise 3: AQ; water walk x3 laps fwd, lateral, retro in deep;  x3 each with shallow fwd  Exercise 4: AQ:  sink ex x10  Exercise 7: AQ; pink board push/pull x10  Exercise 9: AQ:  forward step up/LSU x10 in deeper water;  grey TBand retract/LAE x10 each      Assessment  Assessment: Pt had good tolernace today and able to progress stability ex and resume sink ex and step ups. He continues to require short RBs throughout duration of tx. He had no issue with exiting pool today. WIll continue with aquatics for increased strength/ROM in an unloaded environment.     Activity Tolerance  Activity Tolerance: Patient tolerated treatment well    Patient Education  Ex technique and progression  Pt verbalized/demonstrated good understanding:     [x] Yes         [] No, pt required further clarification. Post Treatment Pain:  0/10      Plan  Plan Frequency: 2x/week  Plan weeks: 4-6       Goals  (Total # of Visits to Date: 25)      Short Term Goals  Time Frame for Short term goals: 3 weeks  Short term goal 1: Pt will initiate HEP-met  Short term goal 2: Pt will initiate aquatic therapy program to assist with improving LE strengthening and endurance-met    Long Term Goals  Time Frame for Long term goals : 6 weeks  Long term goal 1: Pt will be independent and compliant with HEP-  not met  Long term goal 2: Pt will safely perform TUG with LRAD in </= 20 seconds to decrease risk of falls.   Long term goal 3: Pt will be able to complete 10' Walk test in </= 10s to improve safety with community ambulation  Long term goal 4: Pt will tolerate >/= 40-45mins ther ex/act with minimal rest breaks to improve endurance and activity tolerance  -progressing (35 min)  Long term goal 5: Pt will safely ambulate household distances with LRAD to improve functional ambulation throughout house -met    Minutes Tracking:  Time In: 1115  Time Out: 121 [facility]  Minutes: ELPIDIO Jose     Date: 9/28/2022

## 2022-10-03 ENCOUNTER — HOSPITAL ENCOUNTER (OUTPATIENT)
Dept: PHYSICAL THERAPY | Age: 87
Setting detail: THERAPIES SERIES
Discharge: HOME | End: 2022-10-03
Payer: MEDICARE

## 2022-10-03 PROCEDURE — 97113 AQUATIC THERAPY/EXERCISES: CPT

## 2022-10-03 NOTE — PROGRESS NOTES
Phone: Miya           Fax: [phone]                           Outpatient Physical Therapy                                                                            Daily Note    Patient: Milad Faith : 1932  CSN #: [id number]   Referring Physician: Symone Tee DO    Date: 10/3/2022       Treatment Diagnosis: generalized weakness, difficulty walking    Onset Date: 22  PT Insurance Information: Medicare  Total # of Visits Approved: 28 Per Physician Order  Total # of Visits to Date: 23  No Show: 0  Canceled Appointment: 1      Pre-Treatment Pain:  0/10  Subjective: Pt reports he did a lot of walking today around the store. He reports soreness in BLEs. Exercises:  Exercise 1: HEP: standing tolerance / transfer practice  Exercise 2: >> aquatic therapy program for LE strengthening and endurance  Exercise 3: AQ; water walk x3 laps fwd, lateral, retro in deep;  x5 with shallow fwd  Exercise 4: AQ:  sink ex x10  Exercise 6: AQ:  sit to stand x10  Exercise 7: AQ; pink board push/pull x10  Exercise 9: AQ:  forward step up/LSU x10 in deeper water;  grey TBand retract/LAE x10 each      Assessment  Assessment: Pt displays improved tolerance to increase in exercise load today. He took frequent, but short RBs throughout session. He displays improved trunk extension in shallow end while ambulating. WIll progress aquatics for increased strength/ROM in an unloaded environment. Activity Tolerance  Activity Tolerance: Patient tolerated treatment well    Patient Education  Ex technique, continued activity at home  Pt verbalized/demonstrated good understanding:     [x] Yes         [] No, pt required further clarification.        Post Treatment Pain:  0/10      Plan  Plan Frequency: 2x/week  Plan weeks: 4-6       Goals  (Total # of Visits to Date: 21)      Short Term Goals  Time Frame for Short Term Goals: 3 weeks  Short Term Goal 1: Pt will initiate HEP-met  Short Term Goal 2: Pt will initiate aquatic therapy program to assist with improving LE strengthening and endurance-met    Long Term Goals  Time Frame for Long Term Goals : 6 weeks  Long Term Goal 1: Pt will be independent and compliant with HEP-  not met  Long Term Goal 2: Pt will safely perform TUG with LRAD in </= 20 seconds to decrease risk of falls.   Long Term Goal 3: Pt will be able to complete 10' Walk test in </= 10s to improve safety with community ambulation  Long Term Goal 4: Pt will tolerate >/= 40-45mins ther ex/act with minimal rest breaks to improve endurance and activity tolerance  -progressing (35 min)  Long Term Goal 5: Pt will safely ambulate household distances with LRAD to improve functional ambulation throughout house -met    Minutes Tracking:  Time In: 1625  Time Out: [address]  Minutes: Eduar 68, PTA     Date: 10/3/2022

## 2022-10-10 ENCOUNTER — HOSPITAL ENCOUNTER (OUTPATIENT)
Dept: PHYSICAL THERAPY | Age: 87
Setting detail: THERAPIES SERIES
Discharge: HOME | End: 2022-10-10
Payer: MEDICARE

## 2022-10-10 PROCEDURE — 97113 AQUATIC THERAPY/EXERCISES: CPT

## 2022-10-10 NOTE — PROGRESS NOTES
Phone: Miya           Fax: [phone]                           Outpatient Physical Therapy                                                                            Daily Note    Patient: Michelle Vela : 1932  CSN #: [id number]   Referring Physician: Francesca Owen DO    Date: 10/10/2022       Treatment Diagnosis: generalized weakness, difficulty walking    Onset Date: 22  PT Insurance Information: Medicare  Total # of Visits Approved: 28 Per Physician Order  Total # of Visits to Date: 24  No Show: 0  Canceled Appointment: 1      Pre-Treatment Pain:  0/10  Subjective: Pt arrived into pool and walked 10 min early prior to appt while therpaist was in clinic tx another pt. Pt reports \"really bad\" soreness in BLE's. Caregiver states pt walked around a smaller store on Friday while pushing cart without issue. Exercises:  Exercise 1: HEP: standing tolerance / transfer practice  Exercise 2: >> aquatic therapy program for LE strengthening and endurance  Exercise 3: AQ; water walk x3 laps fwd, lateral, retro in deep;  x5 with shallow fwd  Exercise 9: AQ:  forward step up/LSU x10 in deeper water;  grey TBand retract/LAE x10 each      Assessment  Assessment: Pt was limited by LE fatigue today and requested to be done around ~24 min into session. His caregiver reports pt is more ambicious with doing more household tasks, even mowing his lawn. WIll continue with aquatics for increased strength/ROM in an unloaded environment. Activity Tolerance  Activity Tolerance: Patient limited by fatigue    Patient Education  Ex technique, rationale  Pt verbalized/demonstrated good understanding:     [x] Yes         [] No, pt required further clarification.        Post Treatment Pain:  0/10      Plan  Plan Frequency: 2x/week  Plan weeks: 4-6       Goals  (Total # of Visits to Date: 25)      Short Term Goals  Time Frame for Short Term Goals: 3 weeks  Short Term Goal 1: Pt will initiate HEP-met  Short Term Goal 2: Pt will initiate aquatic therapy program to assist with improving LE strengthening and endurance-met    Long Term Goals  Time Frame for Long Term Goals : 6 weeks  Long Term Goal 1: Pt will be independent and compliant with HEP-  not met  Long Term Goal 2: Pt will safely perform TUG with LRAD in </= 20 seconds to decrease risk of falls.   Long Term Goal 3: Pt will be able to complete 10' Walk test in </= 10s to improve safety with community ambulation  Long Term Goal 4: Pt will tolerate >/= 40-45mins ther ex/act with minimal rest breaks to improve endurance and activity tolerance  -progressing (35 min)  Long Term Goal 5: Pt will safely ambulate household distances with LRAD to improve functional ambulation throughout house -met    Minutes Tracking:  Time In: 10 Kalia Graham  Time Out: Johny 46  Minutes: 224 [facility], [facility]     Date: 10/10/2022

## 2022-10-11 NOTE — PLAN OF CARE
[facility]           Phone: [phone]             Outpatient Physical Therapy  Fax: [phone]                                           Date: 10/10/2022  Patient: Rip Tavares : 1932 CSN #: [id number]   Referring Physician: Gabe Hu DO      [] Plan of Care   [x] Updated Plan of Care    Dates of Service to Include: 10/10/2022 to 22    Diagnosis:  Leg weakness    Rehab (Treatment) Diagnosis:  generalized weakness, difficulty walking             Onset Date:  22    Attendance  Total # of Visits to Date: 24 No Show: 0 Canceled Appointment: 1    Assessment  Assessment: Pt continues to demonstrate improvement with activity tolerance for pool therex as well as with activity tolerance at home. Pt feels as if he is able to do more with ADL's at home and feels as if he is making improvements with exercise tolerance in the pool as well. Pt is currently able to tolerate appx 35-40mins of therex at this time, and is able to do moderate yard work at home with planning. Pt would continue to benefit from aquatic therapy to improve functional activity capacity as well as functional strength in an unloaded environment. Goals  Short Term Goals  Time Frame for Short Term Goals: 3 weeks  Short Term Goal 1: Pt will initiate HEP-met  Short Term Goal 2: Pt will initiate aquatic therapy program to assist with improving LE strengthening and endurance-met  Long Term Goals  Time Frame for Long Term Goals : 6 weeks  Long Term Goal 1: Pt will be independent and compliant with HEP-  not met  Long Term Goal 2: Pt will safely perform TUG with LRAD in </= 20 seconds to decrease risk of falls.   Long Term Goal 3: Pt will be able to complete 10' Walk test in </= 10s to improve safety with community ambulation  Long Term Goal 4: Pt will tolerate >/= 40-45mins ther ex/act with minimal rest breaks to improve endurance and activity tolerance  -progressing (35 min)  Long Term Goal 5: Pt will safely ambulate household distances with LRAD to improve functional ambulation throughout house -met     Prognosis       Treatment Plan   Plan Frequency: 2x/week  Plan weeks: 4-6  [] HP/CP      [] Electrical Stim   [x] Therapeutic Exercise      [x] Gait Training  [x] Aquatics   [] Ultrasound         [x] Patient Education/HEP   [] Manual Therapy  [] Traction    [x] Neuro-jolie        [] Soft Tissue Mobs            [] Home TENS  [] Iontophoresis    [] Orthotic casting/fitting      [] Dry Needling             Electronically signed by: Cheryl Whipple PT, DPT    Date: 10/10/2022      ______________________________________ Date: 10/10/2022   Physician Signature

## 2022-10-12 ENCOUNTER — HOSPITAL ENCOUNTER (OUTPATIENT)
Dept: PHYSICAL THERAPY | Age: 87
Setting detail: THERAPIES SERIES
Discharge: HOME | End: 2022-10-12
Payer: MEDICARE

## 2022-10-12 PROCEDURE — 97113 AQUATIC THERAPY/EXERCISES: CPT

## 2022-10-12 NOTE — PROGRESS NOTES
Phone: Miya           Fax: [phone]                           Outpatient Physical Therapy                                                                            Daily Note    Patient: Elizabet Schumacher : 1932  CSN #: [id number]   Referring Physician: Radha Hernandez DO    Date: 10/12/2022       Treatment Diagnosis: generalized weakness, difficulty walking    Onset Date: 22  PT Insurance Information: Medicare  Total # of Visits Approved: 39 Per Physician Order  Total # of Visits to Date: 25  No Show: 0  Canceled Appointment: 1      Pre-Treatment Pain:  0/10  Subjective: Pt reports usual soreness in BLE's today. He states he continues to ambulate with walking stick while out in community. Exercises:  Exercise 1: HEP: standing tolerance / transfer practice  Exercise 2: >> aquatic therapy program for LE strengthening and endurance  Exercise 3: AQ; water walk x4 laps fwd; 2 laps lateral, retro in deep;  x5 with shallow fwd --no shallow today d/t pediatric patient/therapist in shallow end of pool  Exercise 4: AQ:  sink ex x10 - shallow  Exercise 9: AQ:  forward step up/LSU x10 in deeper water;  grey TBand retract/LAE x10 each    Assessment  Assessment: Continued with functinoal standing/ambulation tolerance today to progress towards pt's goals. Pt had better tolerance today and completed sink ex in shallow end today with frequent, but short RBs d/t fatigue. WIll continue with aquatics for increased strength/ROM in an unloaded environment. Activity Tolerance  Activity Tolerance: Patient tolerated treatment well    Patient Education  Ex technique, rationale  Pt verbalized/demonstrated good understanding:     [x] Yes         [] No, pt required further clarification.        Post Treatment Pain:  0/10      Plan  Plan Frequency: 2x/week  Plan weeks: 4-6       Goals  (Total # of Visits to Date: 22)      Short Term Goals  Time Frame for Short Term Goals: 3 weeks  Short Term Goal 1: Pt will initiate HEP-met  Short Term Goal 2: Pt will initiate aquatic therapy program to assist with improving LE strengthening and endurance-met    Long Term Goals  Time Frame for Long Term Goals : 6 weeks  Long Term Goal 1: Pt will be independent and compliant with HEP-  not met  Long Term Goal 2: Pt will safely perform TUG with LRAD in </= 20 seconds to decrease risk of falls.   Long Term Goal 3: Pt will be able to complete 10' Walk test in </= 10s to improve safety with community ambulation  Long Term Goal 4: Pt will tolerate >/= 40-45mins ther ex/act with minimal rest breaks to improve endurance and activity tolerance  -progressing (35 min)  Long Term Goal 5: Pt will safely ambulate household distances with LRAD to improve functional ambulation throughout house -met    Minutes Tracking:  Time In: 2624  Time Out: Erik  Minutes: [address] DALAL, PTA     Date: 10/12/2022

## 2022-10-17 ENCOUNTER — HOSPITAL ENCOUNTER (OUTPATIENT)
Dept: PHYSICAL THERAPY | Age: 87
Setting detail: THERAPIES SERIES
Discharge: HOME | End: 2022-10-17
Payer: MEDICARE

## 2022-10-17 PROCEDURE — 97113 AQUATIC THERAPY/EXERCISES: CPT

## 2022-10-17 NOTE — PROGRESS NOTES
Phone: Miya           Fax: [phone]                           Outpatient Physical Therapy                                                                            Daily Note    Patient: Chloe Boland : 1932  CSN #: [id number]   Referring Physician: Tara Macedo DO    Date: 10/17/2022    Diagnosis: Leg weakness  Treatment Diagnosis: generalized weakness, difficulty walking    Onset Date: 22  PT Insurance Information: Medicare  Total # of Visits Approved: 39 Per Physician Order  Total # of Visits to Date: 32  No Show: 0  Canceled Appointment: 1      Pre-Treatment Pain:  0/10  Subjective: Pt reports his LE's are sore and he is overall tired today. Caregiver [facility] reports he can walk from handicap parking spots to the buildings in the community with a walking stick, much better than he has been in a long time. Exercises:  Exercise 2: >> aquatic therapy program for LE strengthening and endurance  Exercise 3: AQ; water walk x4 laps fwd; 2 laps lateral, retro in deep;  x5  Exercise 4: AQ:  sink ex x15 - shallow  Exercise 8: AQ: jet massage to decrease tone and pain in low back with LAQ/march x15, hip abd and alternating up/down kicks x 10-15 each. Exercise 9: AQ:  forward step up/LSU x10 in deeper water;  grey TBand retract/LAE x10 each--step ups only this date  Exercise 10: AQ:  UE 90/90 with small blue float x 10 each, shoulder elevation x 10        Assessment  Assessment: Pt tolerated treatment well with minimal rest breaks this date. Able to progress reps and initiate core/UE exercises with good tolerance.     Activity Tolerance  Activity Tolerance: Patient tolerated treatment well        Post Treatment Pain:  0/10      Plan  Plan Frequency: 2x/week  Plan weeks: 4-6       Goals  (Total # of Visits to Date: 32)      Short Term Goals  Time Frame for Short Term Goals: 3 weeks  Short Term Goal 1: Pt will initiate HEP-met  Short Term Goal 2: Pt will initiate aquatic therapy program to assist with improving LE strengthening and endurance-met    Long Term Goals  Time Frame for Long Term Goals : 6 weeks  Long Term Goal 1: Pt will be independent and compliant with HEP-  not met  Long Term Goal 2: Pt will safely perform TUG with LRAD in </= 20 seconds to decrease risk of falls.   Long Term Goal 3: Pt will be able to complete 10' Walk test in </= 10s to improve safety with community ambulation  Long Term Goal 4: Pt will tolerate >/= 40-45mins ther ex/act with minimal rest breaks to improve endurance and activity tolerance  -progressing (35 min)  Long Term Goal 5: Pt will safely ambulate household distances with LRAD to improve functional ambulation throughout house -met    Minutes Tracking:  Time In: 1541  Time Out: 1615  Minutes: 34  Timed Code Treatment Minutes: [address], [place] [address]     Date: 10/17/2022

## 2022-10-19 ENCOUNTER — APPOINTMENT (OUTPATIENT)
Dept: PHYSICAL THERAPY | Age: 87
End: 2022-10-19
Payer: MEDICARE

## 2022-10-20 ENCOUNTER — APPOINTMENT (OUTPATIENT)
Dept: PHYSICAL THERAPY | Age: 87
End: 2022-10-20
Payer: MEDICARE

## 2022-10-24 ENCOUNTER — HOSPITAL ENCOUNTER (OUTPATIENT)
Dept: PHYSICAL THERAPY | Age: 87
Setting detail: THERAPIES SERIES
Discharge: HOME | End: 2022-10-24
Payer: MEDICARE

## 2022-10-24 PROCEDURE — 97113 AQUATIC THERAPY/EXERCISES: CPT

## 2022-10-24 NOTE — PROGRESS NOTES
Phone: Miya           Fax: [phone]                           Outpatient Physical Therapy                                                                            Daily Note    Patient: Glenn Miller : 1932  [facility] #: [id number]   Referring Physician: Jesusita Kennedy DO    Date: 10/24/2022    Diagnosis: Leg weakness  Treatment Diagnosis: generalized weakness, difficulty walking    Onset Date: 22  PT Insurance Information: Medicare  Total # of Visits Approved: 36 Per Physician Order  Total # of Visits to Date: 27  No Show: 0  Canceled Appointment: 1      Pre-Treatment Pain:  \"sore\"/10  Subjective: Caregiver Azucena reports he gave [employer] about an hour ago. Patient states \"nothing worth complianing about\" when asked how he felt today. Later on during treatment pt reported his legs were sore today. Exercises:  Exercise 1: HEP: standing tolerance / transfer practice  Exercise 2: >> aquatic therapy program for LE strengthening and endurance  Exercise 3: AQ; water walk x4 laps fwd; 2 laps lateral, retro in deep;  x5  Exercise 4: AQ:  sink ex x15 - shallow  Exercise 6: AQ:  sit to stand x10  Exercise 8: AQ: jet massage to decrease tone and pain in low back with LAQ/march x15, hip abd and alternating up/down kicks x 10-15 each. Exercise 9: AQ:  forward step up/LSU x10 in deeper water;  grey TBand retract/LAE x10 each  Exercise 10: AQ:  UE 90/90 with small blue float x 10 each, shoulder elevation x 10    Assessment  Body Structures, Functions, Activity Limitations Requiring Skilled Therapeutic Intervention: Decreased functional mobility , Decreased ADL status, Decreased body mechanics, Decreased strength, Decreased tolerance to work activity, Decreased endurance, Decreased balance  Assessment: Pt tolerated treatment fair this date with min-mod rest breaks d/t reports of LE fatigue. Pt sat during some of the shoulder matrix exercises this date d/t fatigue. Will continue to progress as tolerated. Activity Tolerance  Activity Tolerance: Patient limited by fatigue    Patient Education  Patient Education: Cues for optimal technique and posture with therex. Pt verbalized/demonstrated good understanding:     [x] Yes         [] No, pt required further clarification. Post Treatment Pain:  \"sore\"/10      Plan  Plan Frequency: 2x/week  Plan weeks: 4-6       Goals  (Total # of Visits to Date: 32)      Short Term Goals  Time Frame for Short Term Goals: 3 weeks  Short Term Goal 1: Pt will initiate HEP-met  Short Term Goal 2: Pt will initiate aquatic therapy program to assist with improving LE strengthening and endurance-met    Long Term Goals  Time Frame for Long Term Goals : 6 weeks  Long Term Goal 1: Pt will be independent and compliant with HEP-  not met  Long Term Goal 2: Pt will safely perform TUG with LRAD in </= 20 seconds to decrease risk of falls.   Long Term Goal 3: Pt will be able to complete 10' Walk test in </= 10s to improve safety with community ambulation  Long Term Goal 4: Pt will tolerate >/= 40-45mins ther ex/act with minimal rest breaks to improve endurance and activity tolerance  -progressing (35 min)  Long Term Goal 5: Pt will safely ambulate household distances with LRAD to improve functional ambulation throughout house -met    Minutes Tracking:  Time In: 0938  Time Out: 1011  Minutes: 33  Timed Code Treatment Minutes: Kerm[address] 6Angy 26     Date: 10/24/2022

## 2022-10-26 ENCOUNTER — APPOINTMENT (OUTPATIENT)
Dept: PHYSICAL THERAPY | Age: 87
End: 2022-10-26
Payer: MEDICARE

## 2022-10-27 ENCOUNTER — HOSPITAL ENCOUNTER (OUTPATIENT)
Dept: PHYSICAL THERAPY | Age: 87
Setting detail: THERAPIES SERIES
Discharge: HOME | End: 2022-10-27
Payer: MEDICARE

## 2022-10-27 PROCEDURE — 97113 AQUATIC THERAPY/EXERCISES: CPT

## 2022-10-27 NOTE — PROGRESS NOTES
Phone: Miya           Fax: [phone]                           Outpatient Physical Therapy                                                                            Daily Note    Patient: Francesco Easton : 1932  CSN #: [id number]   Referring Physician: Medina Guaman DO    Date: 10/27/2022    Diagnosis: Leg weakness  Treatment Diagnosis: generalized weakness, difficulty walking    Onset Date: 22  PT Insurance Information: Medicare  Total # of Visits Approved: 39 Per Physician Order  Total # of Visits to Date: 29  No Show: 0  Canceled Appointment: 1      Pre-Treatment Pain:  \"sore\"  Subjective: Caregiver Azucena reports he gave Kerrie Boop 2 aleve about an hour ago. Patient states he's feeling his usual soreness, like always. Azucena reports doctor prescribed him Ultram for pain since the aleve isn't helping much anymore. Exercises:  Exercise 1: HEP: standing tolerance / transfer practice  Exercise 2: >> aquatic therapy program for LE strengthening and endurance  Exercise 3: AQ; water walk x4 laps fwd; 2 laps lateral, retro in deep;  x5  Exercise 4: AQ:  sink ex x15 - shallow  Exercise 6: AQ:  sit to stand x10, seated therex x 10-15 each  Exercise 7: AQ; pink board push/pull x10, rotation x 10  Exercise 8: AQ: jet massage to decrease tone and pain in low back with /march x15, hip abd and alternating up/down kicks x 10-15 each. Exercise 9: AQ:  forward step up/LSU x10 in deeper water;  grey TBand retract/LAE x10 each  Exercise 10: AQ:  UE 90/90 with dumbbells float x 10 each, shoulder elevation x 10--back/balance support needed.       Assessment  Body Structures, Functions, Activity Limitations Requiring Skilled Therapeutic Intervention: Decreased functional mobility , Decreased ADL status, Decreased body mechanics, Decreased strength, Decreased tolerance to work activity, Decreased endurance, Decreased balance  Assessment: Pt tolerated tx well this date with minimal rest breaks required. Initiated new therex for improve endurance, strength and mobility in offloaded enviornment. Will continue to progress as tolerated. Activity Tolerance       Patient Education  Patient Education: Cues for optimal technique and posture with therex. Initiated new therex. Pt verbalized/demonstrated good understanding:     [x] Yes         [] No, pt required further clarification. Post Treatment Pain:  \"sore\"      Plan  Plan weeks: 4-6       Goals  (Total # of Visits to Date: 29)      Short Term Goals  Time Frame for Short Term Goals: 3 weeks  Short Term Goal 1: Pt will initiate HEP-met  Short Term Goal 2: Pt will initiate aquatic therapy program to assist with improving LE strengthening and endurance-met    Long Term Goals  Time Frame for Long Term Goals : 6 weeks  Long Term Goal 1: Pt will be independent and compliant with HEP-  not met  Long Term Goal 2: Pt will safely perform TUG with LRAD in </= 20 seconds to decrease risk of falls.   Long Term Goal 3: Pt will be able to complete 10' Walk test in </= 10s to improve safety with community ambulation  Long Term Goal 4: Pt will tolerate >/= 40-45mins ther ex/act with minimal rest breaks to improve endurance and activity tolerance  -progressing (35 min)  Long Term Goal 5: Pt will safely ambulate household distances with LRAD to improve functional ambulation throughout house -met    Minutes Tracking:  Time In: 0935  Time Out: 1010  Minutes: 35  Timed Code Treatment Minutes: [address] [facility],  [address], [place] [address]     Date: 10/27/2022

## 2022-10-31 ENCOUNTER — HOSPITAL ENCOUNTER (OUTPATIENT)
Dept: PHYSICAL THERAPY | Age: 87
Setting detail: THERAPIES SERIES
Discharge: HOME | End: 2022-10-31
Payer: MEDICARE

## 2022-10-31 PROCEDURE — 97113 AQUATIC THERAPY/EXERCISES: CPT

## 2022-10-31 NOTE — PROGRESS NOTES
Phone: Miya           Fax: [phone]                           Outpatient Physical Therapy                                                                            Daily Note    Patient: Alicia Lares : 1932  CSN #: [id number]   Referring Physician: Cait Ellis DO    Date: 10/31/2022    Diagnosis: Leg weakness  Treatment Diagnosis: generalized weakness, difficulty walking    Onset Date: 22  PT Insurance Information: Medicare  Total # of Visits Approved: 39 Per Physician Order  Total # of Visits to Date: 29  No Show: 0  Canceled Appointment: 1      Pre-Treatment Pain:  \"sore\"  Subjective: Caregiver Azucena reports she gave Shanice Obando Ultram ~1 hour ago. Patient states he's feeling his usual soreness. Sherri Augustine reports he has not had s/s from starting the Ultram or having the flu vaccine. Exercises:  Exercise 1: HEP: standing tolerance / transfer practice  Exercise 2: >> aquatic therapy program for LE strengthening and endurance  Exercise 3: AQ; water walk x4 laps fwd; 2 laps lateral, retro in deep;  x5  Exercise 4: AQ:  sink ex x20 - shallow  Exercise 6: AQ:  sit to stand x15, seated therex x 20 each--held sit to stands d/t pt reporting he was too tired. Exercise 8: AQ: jet massage to decrease tone and pain in low back with /march x15-20, hip abd and alternating up/down kicks x 15-20 each. Exercise 9: AQ:  forward step up/LSU x15 in deeper water;  grey TBand retract/LAE x15 each  Exercise 10: AQ:  UE 90/90 with dumbbells float x 10 each, shoulder elevation x 10--back/balance support needed. Assessment  Body Structures, Functions, Activity Limitations Requiring Skilled Therapeutic Intervention: Decreased functional mobility , Decreased ADL status, Decreased body mechanics, Decreased strength, Decreased tolerance to work activity, Decreased endurance, Decreased balance  Assessment: Pt tolerated tx well this date with minimal rest breaks required. Continued to progress repetitions for improved endurance, strength and mobility in offloaded enviornment. Will continue to progress as tolerated. Activity Tolerance  Activity Tolerance: Patient limited by fatigue    Patient Education  Patient Education: Cues for optimal technique and posture with therex. Pt verbalized/demonstrated good understanding:     [x] Yes         [] No, pt required further clarification. Post Treatment Pain:  \"sore\"      Plan  Plan Frequency: 2x/week  Plan weeks: 4-6       Goals  (Total # of Visits to Date: 34)      Short Term Goals  Time Frame for Short Term Goals: 3 weeks  Short Term Goal 1: Pt will initiate HEP-met  Short Term Goal 2: Pt will initiate aquatic therapy program to assist with improving LE strengthening and endurance-met    Long Term Goals  Time Frame for Long Term Goals : 6 weeks  Long Term Goal 1: Pt will be independent and compliant with HEP-  not met  Long Term Goal 2: Pt will safely perform TUG with LRAD in </= 20 seconds to decrease risk of falls. Long Term Goal 3: Pt will be able to complete 10' Walk test in </= 10s to improve safety with community ambulation  Long Term Goal 4: Pt will tolerate >/= 40-45mins ther ex/act with minimal rest breaks to improve endurance and activity tolerance --met 1x for 40 min, continue.   Long Term Goal 5: Pt will safely ambulate household distances with LRAD to improve functional ambulation throughout house -met    Minutes Tracking:  Time In: 0945  Time Out: 1027  Minutes: 42  Timed Code Treatment Minutes: [address] [id number]     Date: 10/31/2022

## 2022-11-08 ENCOUNTER — HOSPITAL ENCOUNTER (OUTPATIENT)
Dept: PHYSICAL THERAPY | Age: 87
Setting detail: THERAPIES SERIES
Discharge: HOME | End: 2022-11-08
Payer: MEDICARE

## 2022-11-08 PROCEDURE — 97113 AQUATIC THERAPY/EXERCISES: CPT

## 2022-11-08 NOTE — PROGRESS NOTES
balance  Assessment: Pt tolerated tx well this date with minimal rest breaks required. Continued to progress repetitions for improved endurance, strength and mobility in offloaded enviornment. Will continue to progress as tolerated. Activity Tolerance  Activity Tolerance: Patient limited by fatigue    Patient Education  Patient Education: Cues for optimal technique and posture with therex. Pt verbalized/demonstrated good understanding:     [x] Yes         [] No, pt required further clarification. Post Treatment Pain:  0/10      Plan  Plan Frequency: 2x/week  Plan weeks: 4-6       Goals  (Total # of Visits to Date: 27)      Short Term Goals  Time Frame for Short Term Goals: 3 weeks  Short Term Goal 1: Pt will initiate HEP-met  Short Term Goal 2: Pt will initiate aquatic therapy program to assist with improving LE strengthening and endurance-met    Long Term Goals  Time Frame for Long Term Goals : 6 weeks  Long Term Goal 1: Pt will be independent and compliant with HEP-  not met  Long Term Goal 2: Pt will safely perform TUG with LRAD in </= 20 seconds to decrease risk of falls. Long Term Goal 3: Pt will be able to complete 10' Walk test in </= 10s to improve safety with community ambulation  Long Term Goal 4: Pt will tolerate >/= 40-45mins ther ex/act with minimal rest breaks to improve endurance and activity tolerance --met 1x for 40 min, continue.   Long Term Goal 5: Pt will safely ambulate household distances with LRAD to improve functional ambulation throughout house -met    Minutes Tracking:  Time In: 1330  Time Out: 1412  Minutes: 42  Timed Code Treatment Minutes: [address] [facility] 26     Date: 11/8/2022

## 2022-11-10 ENCOUNTER — HOSPITAL ENCOUNTER (OUTPATIENT)
Dept: PHYSICAL THERAPY | Age: 87
Setting detail: THERAPIES SERIES
Discharge: HOME | End: 2022-11-10
Payer: MEDICARE

## 2022-11-10 PROCEDURE — 97113 AQUATIC THERAPY/EXERCISES: CPT

## 2022-11-10 NOTE — PROGRESS NOTES
Phone: Miya           Fax: [phone]                           Outpatient Physical Therapy                                                                            Daily Note    Patient: Umm Reynolds : 1932  CSN #: [id number]   Referring Physician: Biju Mendoza DO    Date: 11/10/2022    Diagnosis: Leg weakness  Treatment Diagnosis: generalized weakness, difficulty walking    Onset Date: 22  PT Insurance Information: Medicare  Total # of Visits Approved: 39 Per Physician Order  Total # of Visits to Date: 32  No Show: 0  Canceled Appointment: 2      Pre-Treatment Pain:    Subjective: No changes to report from patient or caregiver tolu eZng stated feeling \"the usual\"    Exercises:  Exercise 1: HEP: standing tolerance / transfer practice  Exercise 2: >> aquatic therapy program for LE strengthening and endurance  Exercise 3: AQ; water walk x4 laps fwd; 2 laps lateral, retro in deep;  x5, restisted walking forward x 3 laps with blue wheels. Exercise 4: AQ:  sink ex x20 - shallow  Exercise 5: AQ: wall push ups x 10  Exercise 6: AQ:  sit to stand x10  Exercise 7: AQ; pink board push/pull x15, rotation x 10--with back support for balance  Exercise 8: AQ: jet massage to decrease tone and pain in low back with /march x15-20, hip abd and alternating up/down kicks x 15-20 each--just jet massage this date. Exercise 9: AQ:  forward step up/LSU x15 in deeper water;  grey TBand retract/LAE x15 each  Exercise 10: AQ:  UE 90/90 with double dumbbells float x 10 each, shoulder elevation x 10, shld flexion arm circles CW and CCW x 10 each with blue hand helds, punches with hand helds x 10--back/balance support needed.     Assessment  Body Structures, Functions, Activity Limitations Requiring Skilled Therapeutic Intervention: Decreased functional mobility , Decreased ADL status, Decreased body mechanics, Decreased strength, Decreased tolerance to work activity, Decreased endurance, Decreased balance  Assessment: Pt completed TUG in 11 seconds, 13 seconds and 16 seconds for an average of 13 seconds with walking stick. 10' walk test completed in 8 seconds with walking stick. Pt tolerated tx well this date with minimal rest breaks required. At this point patient has met all goal and will be d/c. PTA consulted with PT, patient and caregiver Azucena--pt to have 8 free open swim passess for continued conditioning following completion of POC with encouragement to continue afterward. Activity Tolerance  Activity Tolerance: Patient tolerated treatment well    Patient Education  Patient Education: Open swim program and d/c d/t goals met. Pt verbalized/demonstrated good understanding:     [x] Yes         [] No, pt required further clarification.     Post Treatment Pain:  \"good\"      Plan  Plan Frequency: 2x/week  Plan weeks: 4-6       Goals  (Total # of Visits to Date: 32)      Short Term Goals  Time Frame for Short Term Goals: 3 weeks  Short Term Goal 1: Pt will initiate HEP-met  Short Term Goal 2: Pt will initiate aquatic therapy program to assist with improving LE strengthening and endurance-met    Long Term Goals  Time Frame for Long Term Goals : 6 weeks  Long Term Goal 1: Pt will be independent and compliant with HEP-  not met  Long Term Goal 2: Pt will safely perform TUG with LRAD in </= 20 seconds to decrease risk of fall--met  Long Term Goal 3: Pt will be able to complete 10' Walk test in </= 10s to improve safety with community ambulation--met  Long Term Goal 4: Pt will tolerate >/= 40-45mins ther ex/act with minimal rest breaks to improve endurance and activity tolerance --met  Long Term Goal 5: Pt will safely ambulate household distances with LRAD to improve functional ambulation throughout house -met    Minutes Tracking:  Time In: 1229  Time Out: 1320  Minutes: 51  Timed Code Treatment Minutes: [address] [place] [id number]     Date: 11/10/2022

## 2022-11-17 ENCOUNTER — APPOINTMENT (OUTPATIENT)
Dept: PHYSICAL THERAPY | Age: 87
End: 2022-11-17
Payer: MEDICARE

## 2022-11-21 ENCOUNTER — APPOINTMENT (OUTPATIENT)
Dept: PHYSICAL THERAPY | Age: 87
End: 2022-11-21
Payer: MEDICARE

## 2022-11-28 ENCOUNTER — APPOINTMENT (OUTPATIENT)
Dept: PHYSICAL THERAPY | Age: 87
End: 2022-11-28
Payer: MEDICARE

## 2023-01-05 NOTE — DISCHARGE SUMMARY
Phone: Miya          Fax: [phone]                            Outpatient Physical Therapy                                                                    Discharge Summary    Patient: Cara Ruth  : 1932  [facility] #: [id number]   Referring Physician: Cait Ellis DO   Diagnosis: Leg weakness  Treatment Diagnosis: generalized weakness, difficulty walking      Date Treatment Initiated: 22  Date of Last Treatment: 11/10/22      PT Visit Information  Onset Date: 22  PT Insurance Information: Medicare  Total # of Visits Approved: 36  Total # of Visits to Date: 32  Plan of Care/Certification Expiration Date: 22  No Show: 0  Canceled Appointment: 2      Frequency/Duration  Plan Frequency: 2x/week times per week  Plan weeks: 4-6 weeks      Treatment Received  [] HP/CP      [] Electrical Stim   [x] Therapeutic Exercise      [x] Gait Training  [x] Aquatics   [] Ultrasound         [x] Patient Education/HEP   [] Manual Therapy  [] Traction    [x] Neuro-jolie        [] Soft Tissue Mobs            [] Home TENS  [] Iontophoresis    [] Orthotic casting/fitting      [] Dry Needling    Assessment  Assessment: Pt completed TUG in 11 seconds, 13 seconds and 16 seconds for an average of 13 seconds with walking stick. 10' walk test completed in 8 seconds with walking stick. Pt tolerated tx well this date with minimal rest breaks required. At this point patient has met all goal and will be d/c. PTA consulted with PT, patient and caregiver Azucena--pt to have 8 free open swim passess for continued conditioning following completion of POC with encouragement to continue afterward.        Goals  Short Term Goals  Time Frame for Short Term Goals: 3 weeks  Short Term Goal 1: Pt will initiate HEP-met  Short Term Goal 2: Pt will initiate aquatic therapy program to assist with improving LE strengthening and endurance-met    Long Term Goals  Time Frame for Long Term Goals : 6 weeks  Long Term Goal 1: Pt will be independent and compliant with HEP-  not met  Long Term Goal 2: Pt will safely perform TUG with LRAD in </= 20 seconds to decrease risk of fall--met  Long Term Goal 3: Pt will be able to complete 10' Walk test in </= 10s to improve safety with community ambulation--met  Long Term Goal 4: Pt will tolerate >/= 40-45mins ther ex/act with minimal rest breaks to improve endurance and activity tolerance --met  Long Term Goal 5: Pt will safely ambulate household distances with LRAD to improve functional ambulation throughout house -met      Reason for Discharge  [x] Goals Achieved                        []  Poor Follow Through/Attendance                  []  Optimal Function Achieved     []  Patient Discharged Self    []  Hospitalization                         []  Physician discharge      Thank you for this referral      Selin Swan, PT, DPT               Date: 1/5/2023

## 2023-01-05 NOTE — DISCHARGE SUMMARY
Phone: Miya          Fax: [phone]                            Outpatient Physical Therapy                                                                    Discharge Summary    Patient: Aure Stein  : 1932  [facility] #: [id number]   Referring Physician: Preston Ruth DO   Diagnosis: Leg weakness  Treatment Diagnosis: generalized weakness, difficulty walking      Date Treatment Initiated: 22  Date of Last Treatment: 11/10/22      PT Visit Information  Onset Date: 22  PT Insurance Information: Medicare  Total # of Visits Approved: 36  Total # of Visits to Date: 32  Plan of Care/Certification Expiration Date: 22  No Show: 0  Canceled Appointment: 2      Frequency/Duration  Plan Frequency: 2x/week times per week  Plan weeks: 4-6 weeks      Treatment Received  [] HP/CP      [] Electrical Stim   [x] Therapeutic Exercise      [x] Gait Training  [x] Aquatics   [] Ultrasound         [x] Patient Education/HEP   [] Manual Therapy  [] Traction    [x] Neuro-jolie        [] Soft Tissue Mobs            [] Home TENS  [] Iontophoresis    [] Orthotic casting/fitting      [] Dry Needling    Assessment  Assessment: Pt completed TUG in 11 seconds, 13 seconds and 16 seconds for an average of 13 seconds with walking stick. 10' walk test completed in 8 seconds with walking stick. Pt tolerated tx well this date with minimal rest breaks required. At this point patient has met all goal and will be d/c. PTA consulted with PT, patient and caregiver Azucena--pt to have 8 free open swim passess for continued conditioning following completion of POC with encouragement to continue afterward. Pt to be d/c to individualized HEP and free swim program at this time.        Goals  Short Term Goals  Time Frame for Short Term Goals: 3 weeks  Short Term Goal 1: Pt will initiate HEP-met  Short Term Goal 2: Pt will initiate aquatic therapy program to assist with improving LE strengthening and endurance-met    Long Term Goals  Time Frame for Long Term Goals : 6 weeks  Long Term Goal 1: Pt will be independent and compliant with HEP-  not met  Long Term Goal 2: Pt will safely perform TUG with LRAD in </= 20 seconds to decrease risk of fall--met  Long Term Goal 3: Pt will be able to complete 10' Walk test in </= 10s to improve safety with community ambulation--met  Long Term Goal 4: Pt will tolerate >/= 40-45mins ther ex/act with minimal rest breaks to improve endurance and activity tolerance --met  Long Term Goal 5: Pt will safely ambulate household distances with LRAD to improve functional ambulation throughout house -met      Reason for Discharge  [x] Goals Achieved                        []  Poor Follow Through/Attendance                  []  Optimal Function Achieved     []  Patient Discharged Self    []  Hospitalization                         []  Physician discharge      Thank you for this referral      Mario Hamilton PT, DPT               Date: 1/5/2023

## 2023-06-12 RX ORDER — TAMSULOSIN HYDROCHLORIDE 0.4 MG/1
CAPSULE ORAL
Qty: 60 CAPSULE | Refills: 0 | Status: SHIPPED | OUTPATIENT
Start: 2023-06-12 | End: 2023-06-29 | Stop reason: SDUPTHER

## 2023-06-12 RX ORDER — TAMSULOSIN HYDROCHLORIDE 0.4 MG/1
CAPSULE ORAL
Qty: 60 CAPSULE | Refills: 11 | OUTPATIENT
Start: 2023-06-12

## 2023-06-29 ENCOUNTER — OFFICE VISIT (OUTPATIENT)
Dept: UROLOGY | Age: 88
End: 2023-06-29
Payer: MEDICARE

## 2023-06-29 VITALS — SYSTOLIC BLOOD PRESSURE: 128 MMHG | DIASTOLIC BLOOD PRESSURE: 77 MMHG | TEMPERATURE: 97.5 F | HEART RATE: 71 BPM

## 2023-06-29 DIAGNOSIS — N40.1 BPH WITH OBSTRUCTION/LOWER URINARY TRACT SYMPTOMS: Primary | ICD-10-CM

## 2023-06-29 DIAGNOSIS — R33.9 RETENTION OF URINE: ICD-10-CM

## 2023-06-29 DIAGNOSIS — N13.8 BPH WITH OBSTRUCTION/LOWER URINARY TRACT SYMPTOMS: Primary | ICD-10-CM

## 2023-06-29 PROCEDURE — PBSHW PR MEAS POST-VOIDING RESIDUAL URINE&/BLADDER CAP: Performed by: NURSE PRACTITIONER

## 2023-06-29 PROCEDURE — G8427 DOCREV CUR MEDS BY ELIG CLIN: HCPCS | Performed by: NURSE PRACTITIONER

## 2023-06-29 PROCEDURE — 1036F TOBACCO NON-USER: CPT | Performed by: NURSE PRACTITIONER

## 2023-06-29 PROCEDURE — 99213 OFFICE O/P EST LOW 20 MIN: CPT | Performed by: NURSE PRACTITIONER

## 2023-06-29 PROCEDURE — 51798 US URINE CAPACITY MEASURE: CPT | Performed by: NURSE PRACTITIONER

## 2023-06-29 PROCEDURE — 1123F ACP DISCUSS/DSCN MKR DOCD: CPT | Performed by: NURSE PRACTITIONER

## 2023-06-29 PROCEDURE — G8421 BMI NOT CALCULATED: HCPCS | Performed by: NURSE PRACTITIONER

## 2023-06-29 RX ORDER — TAMSULOSIN HYDROCHLORIDE 0.4 MG/1
0.4 CAPSULE ORAL 2 TIMES DAILY
Qty: 180 CAPSULE | Refills: 3 | Status: SHIPPED | OUTPATIENT
Start: 2023-06-29

## 2023-06-29 ASSESSMENT — ENCOUNTER SYMPTOMS
COLOR CHANGE: 0
SHORTNESS OF BREATH: 0
CONSTIPATION: 0
EYE REDNESS: 0
VOMITING: 0
COUGH: 0
WHEEZING: 0
ABDOMINAL PAIN: 0
NAUSEA: 0
BACK PAIN: 0

## 2024-07-25 RX ORDER — TAMSULOSIN HYDROCHLORIDE 0.4 MG/1
0.4 CAPSULE ORAL 2 TIMES DAILY
Qty: 60 CAPSULE | OUTPATIENT
Start: 2024-07-25

## 2024-07-29 ENCOUNTER — TELEPHONE (OUTPATIENT)
Dept: UROLOGY | Age: 89
End: 2024-07-29

## 2024-07-29 RX ORDER — TAMSULOSIN HYDROCHLORIDE 0.4 MG/1
0.4 CAPSULE ORAL 2 TIMES DAILY
Qty: 60 CAPSULE | Refills: 0 | Status: SHIPPED | OUTPATIENT
Start: 2024-07-29

## 2024-07-29 NOTE — TELEPHONE ENCOUNTER
Patient is out of Tamsulosin.  I scheduled him 8/22/24.  Can he get enough until his appointment sent to [facility]?

## 2024-08-22 ENCOUNTER — OFFICE VISIT (OUTPATIENT)
Dept: UROLOGY | Age: 89
End: 2024-08-22
Payer: MEDICARE

## 2024-08-22 VITALS
TEMPERATURE: 97.1 F | BODY MASS INDEX: 20.69 KG/M2 | DIASTOLIC BLOOD PRESSURE: 68 MMHG | SYSTOLIC BLOOD PRESSURE: 130 MMHG | WEIGHT: 170 LBS

## 2024-08-22 DIAGNOSIS — N40.1 BPH WITH OBSTRUCTION/LOWER URINARY TRACT SYMPTOMS: ICD-10-CM

## 2024-08-22 DIAGNOSIS — N13.8 BPH WITH OBSTRUCTION/LOWER URINARY TRACT SYMPTOMS: ICD-10-CM

## 2024-08-22 DIAGNOSIS — R33.9 RETENTION OF URINE: Primary | ICD-10-CM

## 2024-08-22 PROCEDURE — 1036F TOBACCO NON-USER: CPT | Performed by: NURSE PRACTITIONER

## 2024-08-22 PROCEDURE — PBSHW MEAS,POST-VOID RES,US,NON-IMAGING: Performed by: NURSE PRACTITIONER

## 2024-08-22 PROCEDURE — G8420 CALC BMI NORM PARAMETERS: HCPCS | Performed by: NURSE PRACTITIONER

## 2024-08-22 PROCEDURE — 99213 OFFICE O/P EST LOW 20 MIN: CPT | Performed by: NURSE PRACTITIONER

## 2024-08-22 PROCEDURE — G8427 DOCREV CUR MEDS BY ELIG CLIN: HCPCS | Performed by: NURSE PRACTITIONER

## 2024-08-22 PROCEDURE — 1123F ACP DISCUSS/DSCN MKR DOCD: CPT | Performed by: NURSE PRACTITIONER

## 2024-08-22 PROCEDURE — 51798 US URINE CAPACITY MEASURE: CPT | Performed by: NURSE PRACTITIONER

## 2024-08-22 RX ORDER — TAMSULOSIN HYDROCHLORIDE 0.4 MG/1
0.4 CAPSULE ORAL 2 TIMES DAILY
Qty: 180 CAPSULE | Refills: 3 | Status: SHIPPED | OUTPATIENT
Start: 2024-08-22

## 2024-08-22 NOTE — PROGRESS NOTES
He is alert and oriented to person, place.           History  5/2021 referral for urinary retention and acute kidney injury.  Kapoor catheter was placed for 1400 mL.  Creatinine was 5.68.  Patient denies having any urinary symptoms prior to developing urinary retention.                Started on flomax     6/2021 Kapoor removed, then replaced due to the inability to urinate and suprapubic pain.  Only having a bowel movement every other day              Increase Flomax to twice per day                 Increase MiraLAX to twice per day    7/2021 PVP [employer]     Today  Here today to follow-up for BPH and history of urinary retention.  He is here with his family.  He is taking Flomax twice per day.  PVR is low, 79 mL.  He denies nocturia or daytime frequency.  He denies incontinence, but his daughter states he is incontinent.  He denies any dysuria or gross hematuria.  He is having a bowel movement daily with MiraLAX.  He is a very poor water drinker.      Plan  Continue flomax twice per day    Try for 2 bottles of water every day    F/U in 1 year or sooner if needed.

## 2024-08-26 RX ORDER — TAMSULOSIN HYDROCHLORIDE 0.4 MG/1
0.4 CAPSULE ORAL 2 TIMES DAILY
Qty: 60 CAPSULE | OUTPATIENT
Start: 2024-08-26

## (undated) DEVICE — GOWN,AURORA,NON-REINFORCED,2XL: Brand: MEDLINE

## (undated) DEVICE — Device

## (undated) DEVICE — BAG DRNGE 2000ML ROUNDED TEARDROP W/ ANTIREFLX CHMBR DISP

## (undated) DEVICE — SOLUTION SURG PREP ANTIMICROBIAL 4 OZ SKIN WND EXIDINE

## (undated) DEVICE — Z INACTIVE USE 2660664 SOLUTION IRRIG 3000ML 0.9% SOD CHL USP UROMATIC PLAS CONT

## (undated) DEVICE — DALE FOLEY CATHETER HOLDER, LEGBAND, FITS UP TO 30": Brand: DALE FOLEY CATHETER HOLDER

## (undated) DEVICE — SOLUTION IV IRRIG WATER 1000ML POUR BRL 2F7114

## (undated) DEVICE — FORCEP SPEC RETRV BX AD 2 MMX155 CM 5 MM GI OVL CUP W/ NDL

## (undated) DEVICE — SET ADMIN 25ML L117IN PMP MOD CK VLV RLER CLMP 2 SMRTSITE

## (undated) DEVICE — DRIP REDUCTION MANIFOLD

## (undated) DEVICE — CANNULA ORAL NSL AD CO2 N INTUB O2 DEL DISP TRU LNK

## (undated) DEVICE — GLOVE ORANGE PI 7 1/2   MSG9075

## (undated) DEVICE — SOLUTION IV 1000ML 0.9% SOD CHL FOR IRRIG PLAS CONT

## (undated) DEVICE — LASER SURG W22XH58IN D36IN 475LB SLD STATE FREQ DOUBLED

## (undated) DEVICE — SOLUTION IV IRRIG POUR BRL 0.9% SODIUM CHL 2F7124

## (undated) DEVICE — SYRINGE ONLY,20ML LUER LOCK: Brand: MEDLINE INDUSTRIES, INC.

## (undated) DEVICE — Z DISCONTINUED BY MEDLINE USE 2711682 TRAY SKIN PREP DRY W/ PREM GLV

## (undated) DEVICE — PLUG CATH F UNIV ENDCAP FOR OCCL DRNGE LUMN DISP

## (undated) DEVICE — MEDI-VAC NON-CONDUCTIVE TUBING7MM X 30.5 (100FT): Brand: CARDINAL HEALTH